# Patient Record
Sex: FEMALE | Race: WHITE | NOT HISPANIC OR LATINO | Employment: FULL TIME | URBAN - METROPOLITAN AREA
[De-identification: names, ages, dates, MRNs, and addresses within clinical notes are randomized per-mention and may not be internally consistent; named-entity substitution may affect disease eponyms.]

---

## 2017-01-09 ENCOUNTER — ROUTINE PRENATAL (OUTPATIENT)
Dept: OBSTETRICS AND GYNECOLOGY | Facility: CLINIC | Age: 32
End: 2017-01-09
Attending: OBSTETRICS & GYNECOLOGY
Payer: MEDICAID

## 2017-01-09 VITALS
WEIGHT: 253.31 LBS | DIASTOLIC BLOOD PRESSURE: 70 MMHG | SYSTOLIC BLOOD PRESSURE: 114 MMHG | BODY MASS INDEX: 42.15 KG/M2

## 2017-01-09 DIAGNOSIS — Z3A.31 31 WEEKS GESTATION OF PREGNANCY: Primary | ICD-10-CM

## 2017-01-09 DIAGNOSIS — O24.410 DIET CONTROLLED GESTATIONAL DIABETES MELLITUS (GDM) IN THIRD TRIMESTER: ICD-10-CM

## 2017-01-09 DIAGNOSIS — O34.219 PREVIOUS CESAREAN SECTION COMPLICATING PREGNANCY: ICD-10-CM

## 2017-01-09 DIAGNOSIS — Z82.79 FAMILY HISTORY OF OPEN NEURAL TUBE DEFECT: ICD-10-CM

## 2017-01-09 DIAGNOSIS — O09.293 HISTORY OF PRE-ECLAMPSIA IN PRIOR PREGNANCY, CURRENTLY PREGNANT IN THIRD TRIMESTER: ICD-10-CM

## 2017-01-09 DIAGNOSIS — O24.419 GESTATIONAL DIABETES MELLITUS (GDM) AFFECTING THIRD PREGNANCY: ICD-10-CM

## 2017-01-09 DIAGNOSIS — Z82.79 FAMILY HISTORY OF CONGENITAL HEART DEFECT: ICD-10-CM

## 2017-01-09 DIAGNOSIS — O99.213 OBESITY AFFECTING PREGNANCY IN THIRD TRIMESTER: ICD-10-CM

## 2017-01-09 DIAGNOSIS — Z23 NEED FOR VACCINATION FOR DTP: ICD-10-CM

## 2017-01-09 DIAGNOSIS — D75.A G6PD DEFICIENCY: ICD-10-CM

## 2017-01-09 PROCEDURE — 99213 OFFICE O/P EST LOW 20 MIN: CPT | Mod: TH,S$PBB,, | Performed by: OBSTETRICS & GYNECOLOGY

## 2017-01-09 PROCEDURE — 99999 PR PBB SHADOW E&M-EST. PATIENT-LVL II: CPT | Mod: PBBFAC,,, | Performed by: OBSTETRICS & GYNECOLOGY

## 2017-01-09 PROCEDURE — 99999 PR PBB SHADOW E&M-EST. PATIENT-LVL I: CPT | Mod: PBBFAC,,,

## 2017-01-09 PROCEDURE — 99212 OFFICE O/P EST SF 10 MIN: CPT | Mod: PBBFAC,27,25 | Performed by: OBSTETRICS & GYNECOLOGY

## 2017-01-09 NOTE — PROGRESS NOTES
Here for TDAP injection. Patient without complaint of pain at this time ,Injection given. Tolerated well. No pain noted after injection.  Advised to wait in lobby 15 minutes and report any adverse reactions.

## 2017-01-17 ENCOUNTER — HOSPITAL ENCOUNTER (OUTPATIENT)
Dept: PERINATAL CARE | Facility: OTHER | Age: 32
Discharge: HOME | End: 2017-01-17
Attending: OBSTETRICS & GYNECOLOGY
Payer: MEDICAID

## 2017-01-17 DIAGNOSIS — O24.419 GESTATIONAL DIABETES MELLITUS (GDM) AFFECTING THIRD PREGNANCY: ICD-10-CM

## 2017-01-17 PROCEDURE — 59025 FETAL NON-STRESS TEST: CPT

## 2017-01-17 PROCEDURE — 76815 OB US LIMITED FETUS(S): CPT | Mod: 26,,, | Performed by: OBSTETRICS & GYNECOLOGY

## 2017-01-17 PROCEDURE — 76815 OB US LIMITED FETUS(S): CPT

## 2017-01-20 ENCOUNTER — HOSPITAL ENCOUNTER (OUTPATIENT)
Dept: PERINATAL CARE | Facility: OTHER | Age: 32
Discharge: HOME | End: 2017-01-20
Attending: OBSTETRICS & GYNECOLOGY
Payer: MEDICAID

## 2017-01-20 DIAGNOSIS — O24.419 GESTATIONAL DIABETES MELLITUS (GDM) AFFECTING THIRD PREGNANCY: ICD-10-CM

## 2017-01-20 PROCEDURE — 76818 FETAL BIOPHYS PROFILE W/NST: CPT | Mod: 26,,, | Performed by: OBSTETRICS & GYNECOLOGY

## 2017-01-20 PROCEDURE — 76818 FETAL BIOPHYS PROFILE W/NST: CPT

## 2017-01-23 ENCOUNTER — HOSPITAL ENCOUNTER (OUTPATIENT)
Dept: PERINATAL CARE | Facility: OTHER | Age: 32
Discharge: HOME | End: 2017-01-23
Attending: OBSTETRICS & GYNECOLOGY
Payer: MEDICAID

## 2017-01-23 ENCOUNTER — ROUTINE PRENATAL (OUTPATIENT)
Dept: OBSTETRICS AND GYNECOLOGY | Facility: CLINIC | Age: 32
End: 2017-01-23
Attending: OBSTETRICS & GYNECOLOGY
Payer: MEDICAID

## 2017-01-23 VITALS
SYSTOLIC BLOOD PRESSURE: 118 MMHG | BODY MASS INDEX: 42.15 KG/M2 | DIASTOLIC BLOOD PRESSURE: 72 MMHG | WEIGHT: 253.31 LBS

## 2017-01-23 DIAGNOSIS — O99.213 OBESITY AFFECTING PREGNANCY IN THIRD TRIMESTER: ICD-10-CM

## 2017-01-23 DIAGNOSIS — Z82.79 FAMILY HISTORY OF OPEN NEURAL TUBE DEFECT: ICD-10-CM

## 2017-01-23 DIAGNOSIS — D75.A G6PD DEFICIENCY: ICD-10-CM

## 2017-01-23 DIAGNOSIS — O34.219 PREVIOUS CESAREAN SECTION COMPLICATING PREGNANCY: Primary | ICD-10-CM

## 2017-01-23 DIAGNOSIS — O09.293 HISTORY OF PRE-ECLAMPSIA IN PRIOR PREGNANCY, CURRENTLY PREGNANT IN THIRD TRIMESTER: ICD-10-CM

## 2017-01-23 DIAGNOSIS — O24.410 DIET CONTROLLED GESTATIONAL DIABETES MELLITUS (GDM) IN THIRD TRIMESTER: ICD-10-CM

## 2017-01-23 DIAGNOSIS — Z82.79 FAMILY HISTORY OF CONGENITAL HEART DEFECT: ICD-10-CM

## 2017-01-23 DIAGNOSIS — O24.419 GESTATIONAL DIABETES MELLITUS (GDM) AFFECTING THIRD PREGNANCY: ICD-10-CM

## 2017-01-23 PROCEDURE — 76815 OB US LIMITED FETUS(S): CPT | Mod: 26,,, | Performed by: OBSTETRICS & GYNECOLOGY

## 2017-01-23 PROCEDURE — 99999 PR PBB SHADOW E&M-EST. PATIENT-LVL II: CPT | Mod: PBBFAC,,, | Performed by: OBSTETRICS & GYNECOLOGY

## 2017-01-23 PROCEDURE — 59025 FETAL NON-STRESS TEST: CPT

## 2017-01-23 PROCEDURE — 76815 OB US LIMITED FETUS(S): CPT

## 2017-01-23 PROCEDURE — 99213 OFFICE O/P EST LOW 20 MIN: CPT | Mod: TH,S$PBB,, | Performed by: OBSTETRICS & GYNECOLOGY

## 2017-01-23 NOTE — MR AVS SNAPSHOT
[facility] [address]  [address]  [place] [address]  Phone: [phone]  Fax: [phone]                  Zhanna Carlton   2017 8:15 AM   Routine Prenatal    Description:  Female : 1985   Provider:  Merari Mosquera MD   Department:  [facility] [address] [facility]           Reason for Visit     Routine Prenatal Visit                To Do List           Future Appointments        Provider Department Dept Phone    2017 9:00 AM TESTING, PRENATAL [facility] [phone]    2017 8:00 AM TESTING, PRENATAL [facility] [phone]    2017 8:15 AM Merari Mosquera MD [facility] [phone]    2017 8:00 AM Merari Mosquera MD [facility] [address] [facility] [phone]    2017 8:00 AM Merari Mosquera MD [facility] [address] [facility] [phone]      Goals (5 Years of Data)     None      [facility]     [facility] -  Assistance  Registered nurses in the [facility] provide clinical advisement, health education, appointment booking, and other advisory services.  Call for this free service at [phone].             Medications           Message regarding Medications     Verify the changes and/or additions to your medication regime listed below are the same as discussed with your clinician today.  If any of these changes or additions are incorrect, please notify your healthcare provider.             Verify that the below list of medications is an accurate representation of the medications you are currently taking.  If none reported, the list may be blank. If incorrect, please contact your healthcare provider. Carry this list with you in case of emergency.           Current Medications     ascorbic acid, vitamin C, (VITAMIN C) 100 MG tablet Take 100 mg by mouth once daily.    aspirin (ECOTRIN) 81 MG EC tablet Take 81 mg by mouth once daily.    PNV  "cmb#21-iron-folic acid (PRENATAL COMPLETE)  mg-mcg Tab Take 1 tablet by mouth once daily.    blood sugar diagnostic Strp TRUE MATRIX blood glucose test strips & lancets. Check blood sugars 4 times daily.           Clinical Reference Information           Prenatal Vitals     Enc. Date GA Prenatal Vitals Prenatal Pulse Pain Level Urine Albumin/Glucose Edema Presentation Dilation/Effacement/Station    17 33w2d 118/72 / 114.9 kg (253 lb 4.9 oz)   0 Negative / Negative       17 31w2d 114/70 / 114.9 kg (253 lb 4.9 oz)   0 Negative / Negative       16 29w4d 116/80 / 113.6 kg (250 lb 7.1 oz) 32 cm / 148 / Present  0 Negative / Negative None / None / None / No      16 27w6d 118/82 / 115.3 kg (254 lb 3.1 oz) 30 cm / 147 / Present  0 Negative / Negative None / None / None / No      16 24w4d 128/86 / 114.3 kg (251 lb 15.8 oz)           16 23w4d 110/70 / 113.6 kg (250 lb 7.1 oz) 25 cm / 148 / Present  0 Negative / Negative None / None / None / No      10/19/16 19w4d 130/70 / 110.7 kg (244 lb 0.8 oz) 20 cm / 148  0 Negative / Negative None / None / None / No      10/16/16 19w1d Admission Dx: Antepartum premature rupture of membrane Dept: [facility] ASSE    16 16w6d 120/74 / 112.4 kg (247 lb 12.8 oz)   0 Negative / Negative None / None / None / No      16 10w4d 132/80 / 111.4 kg (245 lb 9.5 oz)   0 Negative / Negative       16 6w4d 118/76 / 108.2 kg (238 lb 8.6 oz)     None / None / None / No         TW.9 kg (15 lb 3.4 oz)   Pregravid weight: 108 kg (238 lb 1.6 oz)   Number of fetuses: 1   Height: 5' 5" (1.651 m)   BMI: 39.6       Vital Signs - Last Recorded  Most recent update: 2017  8:14 AM by Keri Muir MA    BP Wt LMP BMI       118/72 114.9 kg (253 lb 4.9 oz) 2016 (Exact Date) 42.15 kg/m2       Allergies as of 2017     No Known Allergies      Immunizations Administered on Date of Encounter - 2017     None      "

## 2017-01-25 ENCOUNTER — HOSPITAL ENCOUNTER (OUTPATIENT)
Dept: PERINATAL CARE | Facility: OTHER | Age: 32
Discharge: HOME | End: 2017-01-25
Attending: OBSTETRICS & GYNECOLOGY
Payer: MEDICAID

## 2017-01-25 DIAGNOSIS — O24.419 GESTATIONAL DIABETES MELLITUS (GDM) AFFECTING THIRD PREGNANCY: ICD-10-CM

## 2017-01-25 PROCEDURE — 59025 FETAL NON-STRESS TEST: CPT

## 2017-01-25 PROCEDURE — 59025 FETAL NON-STRESS TEST: CPT | Mod: 26,,, | Performed by: OBSTETRICS & GYNECOLOGY

## 2017-01-31 ENCOUNTER — HOSPITAL ENCOUNTER (OUTPATIENT)
Dept: PERINATAL CARE | Facility: OTHER | Age: 32
Discharge: HOME | End: 2017-01-31
Attending: OBSTETRICS & GYNECOLOGY
Payer: MEDICAID

## 2017-01-31 DIAGNOSIS — O24.419 GESTATIONAL DIABETES MELLITUS (GDM) AFFECTING THIRD PREGNANCY: ICD-10-CM

## 2017-01-31 PROCEDURE — 59025 FETAL NON-STRESS TEST: CPT

## 2017-01-31 PROCEDURE — 76815 OB US LIMITED FETUS(S): CPT | Mod: 26,,, | Performed by: OBSTETRICS & GYNECOLOGY

## 2017-01-31 PROCEDURE — 76815 OB US LIMITED FETUS(S): CPT

## 2017-02-03 ENCOUNTER — HOSPITAL ENCOUNTER (OUTPATIENT)
Dept: PERINATAL CARE | Facility: OTHER | Age: 32
Discharge: HOME | End: 2017-02-03
Attending: OBSTETRICS & GYNECOLOGY
Payer: MEDICAID

## 2017-02-03 DIAGNOSIS — O24.419 GESTATIONAL DIABETES MELLITUS (GDM) AFFECTING THIRD PREGNANCY: ICD-10-CM

## 2017-02-03 PROCEDURE — 59025 FETAL NON-STRESS TEST: CPT

## 2017-02-03 PROCEDURE — 59025 FETAL NON-STRESS TEST: CPT | Mod: 26,,, | Performed by: OBSTETRICS & GYNECOLOGY

## 2017-02-03 PROCEDURE — 76815 OB US LIMITED FETUS(S): CPT

## 2017-02-07 ENCOUNTER — ROUTINE PRENATAL (OUTPATIENT)
Dept: OBSTETRICS AND GYNECOLOGY | Facility: CLINIC | Age: 32
End: 2017-02-07
Attending: OBSTETRICS & GYNECOLOGY
Payer: MEDICAID

## 2017-02-07 ENCOUNTER — HOSPITAL ENCOUNTER (OUTPATIENT)
Dept: PERINATAL CARE | Facility: OTHER | Age: 32
Discharge: HOME | End: 2017-02-07
Attending: OBSTETRICS & GYNECOLOGY
Payer: MEDICAID

## 2017-02-07 VITALS — SYSTOLIC BLOOD PRESSURE: 142 MMHG | DIASTOLIC BLOOD PRESSURE: 72 MMHG | BODY MASS INDEX: 42.19 KG/M2 | WEIGHT: 253.5 LBS

## 2017-02-07 DIAGNOSIS — Z82.79 FAMILY HISTORY OF OPEN NEURAL TUBE DEFECT: ICD-10-CM

## 2017-02-07 DIAGNOSIS — D75.A G6PD DEFICIENCY: ICD-10-CM

## 2017-02-07 DIAGNOSIS — O09.293 HISTORY OF PRE-ECLAMPSIA IN PRIOR PREGNANCY, CURRENTLY PREGNANT IN THIRD TRIMESTER: ICD-10-CM

## 2017-02-07 DIAGNOSIS — O13.3 GESTATIONAL HYPERTENSION, THIRD TRIMESTER: ICD-10-CM

## 2017-02-07 DIAGNOSIS — O24.410 DIET CONTROLLED GESTATIONAL DIABETES MELLITUS (GDM) IN THIRD TRIMESTER: ICD-10-CM

## 2017-02-07 DIAGNOSIS — Z82.79 FAMILY HISTORY OF CONGENITAL HEART DEFECT: ICD-10-CM

## 2017-02-07 DIAGNOSIS — Z36.85 SCREENING, ANTENATAL, FOR STREPTOCOCCUS B: ICD-10-CM

## 2017-02-07 DIAGNOSIS — O34.219 PREVIOUS CESAREAN SECTION COMPLICATING PREGNANCY: Primary | ICD-10-CM

## 2017-02-07 DIAGNOSIS — O24.419 GESTATIONAL DIABETES MELLITUS (GDM) AFFECTING THIRD PREGNANCY: ICD-10-CM

## 2017-02-07 DIAGNOSIS — Z34.80 SUPERVISION OF OTHER NORMAL PREGNANCY: ICD-10-CM

## 2017-02-07 LAB
CREAT UR-MCNC: 71 MG/DL
PROT UR-MCNC: <7 MG/DL
PROT/CREAT RATIO, UR: NORMAL

## 2017-02-07 PROCEDURE — 59025 FETAL NON-STRESS TEST: CPT

## 2017-02-07 PROCEDURE — 87147 CULTURE TYPE IMMUNOLOGIC: CPT

## 2017-02-07 PROCEDURE — 76815 OB US LIMITED FETUS(S): CPT | Mod: 26,,, | Performed by: OBSTETRICS & GYNECOLOGY

## 2017-02-07 PROCEDURE — 76815 OB US LIMITED FETUS(S): CPT

## 2017-02-07 PROCEDURE — 59025 FETAL NON-STRESS TEST: CPT | Mod: 26,,, | Performed by: OBSTETRICS & GYNECOLOGY

## 2017-02-07 PROCEDURE — 99999 PR PBB SHADOW E&M-EST. PATIENT-LVL II: CPT | Mod: PBBFAC,,, | Performed by: OBSTETRICS & GYNECOLOGY

## 2017-02-07 PROCEDURE — 99213 OFFICE O/P EST LOW 20 MIN: CPT | Mod: TH,S$PBB,, | Performed by: OBSTETRICS & GYNECOLOGY

## 2017-02-07 PROCEDURE — 87184 SC STD DISK METHOD PER PLATE: CPT

## 2017-02-07 NOTE — PROGRESS NOTES
Pregnancy at 35w3d with slightly decreased Fetal Movement for the past 2 days. Will get testing today and twice weekly. Denies headaches, N/V , or abd pain. Reinforced kick counts, labor precautions, Pre Eclampsia precautions.

## 2017-02-10 ENCOUNTER — HOSPITAL ENCOUNTER (OUTPATIENT)
Dept: PERINATAL CARE | Facility: OTHER | Age: 32
Discharge: HOME | End: 2017-02-10
Attending: OBSTETRICS & GYNECOLOGY
Payer: MEDICAID

## 2017-02-10 DIAGNOSIS — O24.419 GESTATIONAL DIABETES MELLITUS (GDM) AFFECTING THIRD PREGNANCY: ICD-10-CM

## 2017-02-10 PROCEDURE — 59025 FETAL NON-STRESS TEST: CPT | Mod: 26,,, | Performed by: OBSTETRICS & GYNECOLOGY

## 2017-02-10 PROCEDURE — 59025 FETAL NON-STRESS TEST: CPT

## 2017-02-12 LAB — BACTERIA SPEC AEROBE CULT: NORMAL

## 2017-02-14 ENCOUNTER — ROUTINE PRENATAL (OUTPATIENT)
Dept: OBSTETRICS AND GYNECOLOGY | Facility: CLINIC | Age: 32
End: 2017-02-14
Attending: OBSTETRICS & GYNECOLOGY
Payer: MEDICAID

## 2017-02-14 ENCOUNTER — HOSPITAL ENCOUNTER (OUTPATIENT)
Dept: PERINATAL CARE | Facility: OTHER | Age: 32
Discharge: HOME | End: 2017-02-14
Attending: OBSTETRICS & GYNECOLOGY
Payer: MEDICAID

## 2017-02-14 VITALS
BODY MASS INDEX: 42.23 KG/M2 | SYSTOLIC BLOOD PRESSURE: 122 MMHG | DIASTOLIC BLOOD PRESSURE: 74 MMHG | WEIGHT: 253.75 LBS

## 2017-02-14 DIAGNOSIS — D75.A G6PD DEFICIENCY: ICD-10-CM

## 2017-02-14 DIAGNOSIS — Z82.79 FAMILY HISTORY OF OPEN NEURAL TUBE DEFECT: ICD-10-CM

## 2017-02-14 DIAGNOSIS — O24.419 GESTATIONAL DIABETES MELLITUS (GDM) AFFECTING THIRD PREGNANCY: ICD-10-CM

## 2017-02-14 DIAGNOSIS — O34.219 PREVIOUS CESAREAN SECTION COMPLICATING PREGNANCY: Primary | ICD-10-CM

## 2017-02-14 DIAGNOSIS — O99.820 GROUP B STREPTOCOCCUS CARRIER, +RV CULTURE, CURRENTLY PREGNANT: ICD-10-CM

## 2017-02-14 DIAGNOSIS — Z82.79 FAMILY HISTORY OF CONGENITAL HEART DEFECT: ICD-10-CM

## 2017-02-14 DIAGNOSIS — O99.213 OBESITY AFFECTING PREGNANCY IN THIRD TRIMESTER: ICD-10-CM

## 2017-02-14 DIAGNOSIS — O09.293 HISTORY OF PRE-ECLAMPSIA IN PRIOR PREGNANCY, CURRENTLY PREGNANT IN THIRD TRIMESTER: ICD-10-CM

## 2017-02-14 DIAGNOSIS — O24.410 DIET CONTROLLED GESTATIONAL DIABETES MELLITUS (GDM) IN THIRD TRIMESTER: ICD-10-CM

## 2017-02-14 PROCEDURE — 76815 OB US LIMITED FETUS(S): CPT | Mod: 26,,, | Performed by: OBSTETRICS & GYNECOLOGY

## 2017-02-14 PROCEDURE — 59025 FETAL NON-STRESS TEST: CPT | Mod: 26,,, | Performed by: OBSTETRICS & GYNECOLOGY

## 2017-02-14 PROCEDURE — 99212 OFFICE O/P EST SF 10 MIN: CPT | Mod: PBBFAC | Performed by: OBSTETRICS & GYNECOLOGY

## 2017-02-14 PROCEDURE — 99213 OFFICE O/P EST LOW 20 MIN: CPT | Mod: TH,S$PBB,, | Performed by: OBSTETRICS & GYNECOLOGY

## 2017-02-14 PROCEDURE — 99999 PR PBB SHADOW E&M-EST. PATIENT-LVL II: CPT | Mod: PBBFAC,,, | Performed by: OBSTETRICS & GYNECOLOGY

## 2017-02-14 NOTE — PROGRESS NOTES
Pregnancy at 36w3d, with good FM. Gest DM, diet controlled.  Discussed Pre Eclampsia precautions.  + Grp B Strep cultures, needs PCN @ Delivery

## 2017-02-17 ENCOUNTER — HOSPITAL ENCOUNTER (OUTPATIENT)
Dept: PERINATAL CARE | Facility: OTHER | Age: 32
Discharge: HOME | End: 2017-02-17
Attending: OBSTETRICS & GYNECOLOGY
Payer: MEDICAID

## 2017-02-17 DIAGNOSIS — O24.419 GESTATIONAL DIABETES MELLITUS (GDM) AFFECTING THIRD PREGNANCY: ICD-10-CM

## 2017-02-17 PROCEDURE — 59025 FETAL NON-STRESS TEST: CPT | Mod: 26,,, | Performed by: OBSTETRICS & GYNECOLOGY

## 2017-02-17 PROCEDURE — 59025 FETAL NON-STRESS TEST: CPT

## 2017-02-17 NOTE — PROGRESS NOTES
Indication: NST in PNT (Dr. Mosquera).   GDM.   ____________________________________________________________________________  History: Age: 31 years. : 3 Para: 2.   Obstetric History: Mode of last delivery:  Section.  ____________________________________________________________________________  Dating:  LMP: 16 EDC: 17 GA by LMP: 36w6d  Best Overall Assessment: 10/24/16 EDC: 17 Assessed GA: 36w6d  The Best Overall Assessment is based on the LMP.  ____________________________________________________________________________  Anatomy Scan:  Perez gestation.  ____________________________________________________________________________  Fetal Wellbeing Assessment:  Non Stress Test: reactive. Fetal heart rate: 130 bpm. +accels, no decels, moderate variability, no reg ctx.     ____________________________________________________________________________  Maternal Assessment:  Followup examination.   Blood pressure: 128 / 84 mmHg.   Progress Notes: reports + fetal movement, denies vag bleeding, ctx or loss of fluid.   Comments: taking Vit C, ASA 81mg q day, PNV.   Summary: monitored 2341-7111.   ____________________________________________________________________________  Education:  The following topics have been discussed with the patient:  Fetal movement monitoring.   ____________________________________________________________________________  Report Summary:  Impression: NST reactive and reassuring.     Recommendations: fetal surveillance per clinical condition

## 2017-02-21 ENCOUNTER — ROUTINE PRENATAL (OUTPATIENT)
Dept: OBSTETRICS AND GYNECOLOGY | Facility: CLINIC | Age: 32
End: 2017-02-21
Attending: OBSTETRICS & GYNECOLOGY
Payer: MEDICAID

## 2017-02-21 ENCOUNTER — HOSPITAL ENCOUNTER (OUTPATIENT)
Dept: PERINATAL CARE | Facility: OTHER | Age: 32
Discharge: HOME | End: 2017-02-21
Attending: OBSTETRICS & GYNECOLOGY
Payer: MEDICAID

## 2017-02-21 VITALS — SYSTOLIC BLOOD PRESSURE: 130 MMHG | WEIGHT: 255.5 LBS | DIASTOLIC BLOOD PRESSURE: 80 MMHG | BODY MASS INDEX: 42.52 KG/M2

## 2017-02-21 DIAGNOSIS — O99.213 OBESITY AFFECTING PREGNANCY IN THIRD TRIMESTER: ICD-10-CM

## 2017-02-21 DIAGNOSIS — D75.A G6PD DEFICIENCY: ICD-10-CM

## 2017-02-21 DIAGNOSIS — O09.293 HISTORY OF PRE-ECLAMPSIA IN PRIOR PREGNANCY, CURRENTLY PREGNANT IN THIRD TRIMESTER: ICD-10-CM

## 2017-02-21 DIAGNOSIS — Z82.79 FAMILY HISTORY OF OPEN NEURAL TUBE DEFECT: ICD-10-CM

## 2017-02-21 DIAGNOSIS — O24.410 DIET CONTROLLED GESTATIONAL DIABETES MELLITUS (GDM) IN THIRD TRIMESTER: ICD-10-CM

## 2017-02-21 DIAGNOSIS — O24.419 GESTATIONAL DIABETES MELLITUS (GDM) AFFECTING THIRD PREGNANCY: ICD-10-CM

## 2017-02-21 DIAGNOSIS — O34.219 PREVIOUS CESAREAN SECTION COMPLICATING PREGNANCY: ICD-10-CM

## 2017-02-21 DIAGNOSIS — O22.43 HEMORRHOIDS DURING PREGNANCY IN THIRD TRIMESTER: ICD-10-CM

## 2017-02-21 DIAGNOSIS — Z82.79 FAMILY HISTORY OF CONGENITAL HEART DEFECT: ICD-10-CM

## 2017-02-21 DIAGNOSIS — O24.419 GESTATIONAL DIABETES MELLITUS (GDM) AFFECTING THIRD PREGNANCY: Primary | ICD-10-CM

## 2017-02-21 DIAGNOSIS — O99.820 GROUP B STREPTOCOCCUS CARRIER, +RV CULTURE, CURRENTLY PREGNANT: ICD-10-CM

## 2017-02-21 DIAGNOSIS — O24.410 DIET CONTROLLED GESTATIONAL DIABETES MELLITUS (GDM) IN SECOND TRIMESTER: ICD-10-CM

## 2017-02-21 PROCEDURE — 99213 OFFICE O/P EST LOW 20 MIN: CPT | Mod: TH,S$PBB,, | Performed by: OBSTETRICS & GYNECOLOGY

## 2017-02-21 PROCEDURE — 59025 FETAL NON-STRESS TEST: CPT

## 2017-02-21 PROCEDURE — 76815 OB US LIMITED FETUS(S): CPT

## 2017-02-21 PROCEDURE — 76815 OB US LIMITED FETUS(S): CPT | Mod: 26,,, | Performed by: OBSTETRICS & GYNECOLOGY

## 2017-02-21 PROCEDURE — 59025 FETAL NON-STRESS TEST: CPT | Mod: 26,,, | Performed by: OBSTETRICS & GYNECOLOGY

## 2017-02-21 PROCEDURE — 99999 PR PBB SHADOW E&M-EST. PATIENT-LVL I: CPT | Mod: PBBFAC,,, | Performed by: OBSTETRICS & GYNECOLOGY

## 2017-02-21 RX ORDER — HYDROCORTISONE 25 MG/G
CREAM TOPICAL
Qty: 30 G | Refills: 1 | Status: SHIPPED | OUTPATIENT
Start: 2017-02-21 | End: 2017-03-23

## 2017-02-21 RX ORDER — LANCETS 28 GAUGE
1 EACH MISCELLANEOUS 4 TIMES DAILY
Qty: 150 EACH | Refills: 6 | Status: ON HOLD | OUTPATIENT
Start: 2017-02-21 | End: 2017-03-09 | Stop reason: HOSPADM

## 2017-02-21 NOTE — PROGRESS NOTES
Pregnancy at 37w3d with painful hemorrhoids. NON Thrombosed.   Pre Eclampsia precautions. Twice weekly NST .

## 2017-02-21 NOTE — PATIENT INSTRUCTIONS
Pregnancy and Childbirth: What to Bring to the Hospital    Youre likely feeling anxious as your childs birth approaches. This is normal. To give yourself some peace of mind, pack a bag ahead of time. Do this about 1 month ahead of your estimated delivery date. Here is a list of things to remember:  · Personal care items, like a toothbrush, hair brush, lip balm, lotion, and shampoo  · Eyeglasses (if you wear them)  · Nightgown (if you plan to breastfeed, pack 1 that allows for nursing)  · Nursing bra if you plan to breastfeed  · Bathrobe and slippers  · Many hospitals provide maternity underwear, but you may want to bring underwear that can be soiled because you will have bleeding after delivery  · Comfortable clothes for you to wear home, like sweat pants, yoga pants, or other stretchable clothes, because your prepregnancy clothes may not fit after delivery of your baby  · Clothes for your baby to wear home  · Personal music player and headphones  · Camera with new batteries or   · Coins for vending machines  · Telephone numbers of people to call after the birth  · Cell phone and   · Insurance information and any other paperwork needed for your hospital stay  · A list of baby names you are considering  · An infant, rear-facing car seat for bringing home your baby (this is required by law)  Add anything else that you dont want to forget:  _____________________________________  _____________________________________  _____________________________________  _____________________________________  _____________________________________  _____________________________________  _____________________________________  Date Last Reviewed: 8/7/2015  © 4371-1067 [employer]. [address] [place] [address]. All rights reserved. This information is not intended as a substitute for professional medical care. Always follow your healthcare professional's instructions.

## 2017-02-22 NOTE — PROGRESS NOTES
Indication: NST, MVP in PNT (DRU OROPEZA).   Maternal age (31 years).   GDM.   ____________________________________________________________________________  History: Age: 31 years. : 3 Para: 2.   Obstetric History: Mode of last delivery:  Section.  ____________________________________________________________________________  Dating:  LMP: 16 EDC: 17 GA by LMP: 37w3d  Best Overall Assessment: 10/24/16 EDC: 17 Assessed GA: 37w3d  The Best Overall Assessment is based on the LMP.  ____________________________________________________________________________  General Evaluation:  Fetal heart activity: present. Fetal heart rate: 141 bpm.   Presentation: cephalic.   Fetal movement: visible.   Amniotic Fluid: normal. Maximal vertical pocket 6.5 cm.   Cord: 3 vessels.   Placenta: posterior fundal. Left.     ____________________________________________________________________________  Anatomy Scan:  Perez gestation.     ____________________________________________________________________________  Fetal Wellbeing Assessment:  Amniotic fluid: normal. MVP: 6.5 cm.   Non Stress Test: reactive. Fetal heart rate: 130 bpm. + accels, no decels ,moderate variability, no reg ctx ( fetus w hiccups).     ____________________________________________________________________________  Maternal Assessment:  Followup examination.   Blood pressure: 129 / 81 mmHg.   Progress Notes: reports + fetal movement, denies vag bleeding , ctx or loss of fluid.   Comments: taking PNV, Vit C, ASA 81mg  forgot log today.   Summary: monitored [id number].   ____________________________________________________________________________  Education:  The following topics have been discussed with the patient:  Fetal movement monitoring.   ____________________________________________________________________________  Report Summary:  Impression: NST reactive and reassuring  Amniotic fluid volume wnl (MVP 6.5cm).      Recommendations: fetal surveillance per clinical condition

## 2017-02-24 ENCOUNTER — HOSPITAL ENCOUNTER (OUTPATIENT)
Dept: PERINATAL CARE | Facility: OTHER | Age: 32
Discharge: HOME | End: 2017-02-24
Attending: OBSTETRICS & GYNECOLOGY
Payer: MEDICAID

## 2017-02-24 DIAGNOSIS — O24.419 GESTATIONAL DIABETES MELLITUS (GDM) AFFECTING THIRD PREGNANCY: ICD-10-CM

## 2017-02-24 PROCEDURE — 76815 OB US LIMITED FETUS(S): CPT

## 2017-02-24 PROCEDURE — 59025 FETAL NON-STRESS TEST: CPT | Mod: 26,,, | Performed by: OBSTETRICS & GYNECOLOGY

## 2017-02-24 PROCEDURE — 59025 FETAL NON-STRESS TEST: CPT

## 2017-02-27 ENCOUNTER — ROUTINE PRENATAL (OUTPATIENT)
Dept: OBSTETRICS AND GYNECOLOGY | Facility: CLINIC | Age: 32
End: 2017-02-27
Attending: OBSTETRICS & GYNECOLOGY
Payer: MEDICAID

## 2017-02-27 ENCOUNTER — HOSPITAL ENCOUNTER (OUTPATIENT)
Dept: PERINATAL CARE | Facility: OTHER | Age: 32
Discharge: HOME | End: 2017-02-27
Attending: OBSTETRICS & GYNECOLOGY
Payer: MEDICAID

## 2017-02-27 VITALS — BODY MASS INDEX: 42.7 KG/M2 | DIASTOLIC BLOOD PRESSURE: 70 MMHG | SYSTOLIC BLOOD PRESSURE: 118 MMHG | WEIGHT: 256.63 LBS

## 2017-02-27 DIAGNOSIS — O99.820 GROUP B STREPTOCOCCUS CARRIER, +RV CULTURE, CURRENTLY PREGNANT: ICD-10-CM

## 2017-02-27 DIAGNOSIS — O99.213 OBESITY AFFECTING PREGNANCY IN THIRD TRIMESTER: ICD-10-CM

## 2017-02-27 DIAGNOSIS — O24.419 GESTATIONAL DIABETES MELLITUS (GDM) AFFECTING THIRD PREGNANCY: ICD-10-CM

## 2017-02-27 DIAGNOSIS — Z82.79 FAMILY HISTORY OF OPEN NEURAL TUBE DEFECT: ICD-10-CM

## 2017-02-27 DIAGNOSIS — O09.293 HISTORY OF PRE-ECLAMPSIA IN PRIOR PREGNANCY, CURRENTLY PREGNANT IN THIRD TRIMESTER: Primary | ICD-10-CM

## 2017-02-27 DIAGNOSIS — Z82.79 FAMILY HISTORY OF CONGENITAL HEART DEFECT: ICD-10-CM

## 2017-02-27 DIAGNOSIS — O24.410 DIET CONTROLLED GESTATIONAL DIABETES MELLITUS (GDM) IN THIRD TRIMESTER: ICD-10-CM

## 2017-02-27 DIAGNOSIS — D75.A G6PD DEFICIENCY: ICD-10-CM

## 2017-02-27 DIAGNOSIS — O34.219 PREVIOUS CESAREAN SECTION COMPLICATING PREGNANCY: ICD-10-CM

## 2017-02-27 PROCEDURE — 76820 UMBILICAL ARTERY ECHO: CPT | Mod: 26,,, | Performed by: OBSTETRICS & GYNECOLOGY

## 2017-02-27 PROCEDURE — 99999 PR PBB SHADOW E&M-EST. PATIENT-LVL II: CPT | Mod: PBBFAC,,, | Performed by: OBSTETRICS & GYNECOLOGY

## 2017-02-27 PROCEDURE — 59025 FETAL NON-STRESS TEST: CPT | Mod: 26,,, | Performed by: OBSTETRICS & GYNECOLOGY

## 2017-02-27 PROCEDURE — 99213 OFFICE O/P EST LOW 20 MIN: CPT | Mod: TH,S$PBB,, | Performed by: OBSTETRICS & GYNECOLOGY

## 2017-02-27 PROCEDURE — 99212 OFFICE O/P EST SF 10 MIN: CPT | Mod: PBBFAC | Performed by: OBSTETRICS & GYNECOLOGY

## 2017-02-27 NOTE — PROGRESS NOTES
Pregnancy at 38w2d with reports of good FM. Denies contrs. LOF or bleeding. For Repeat  with BTL next Monday (one week). Discussed kick counts, labor precautions, Pre Eclampsia precautions.

## 2017-03-02 ENCOUNTER — HOSPITAL ENCOUNTER (OUTPATIENT)
Dept: PERINATAL CARE | Facility: OTHER | Age: 32
Discharge: HOME | End: 2017-03-02
Attending: OBSTETRICS & GYNECOLOGY
Payer: MEDICAID

## 2017-03-02 DIAGNOSIS — O24.419 GESTATIONAL DIABETES MELLITUS (GDM) AFFECTING THIRD PREGNANCY: ICD-10-CM

## 2017-03-02 PROCEDURE — 59025 FETAL NON-STRESS TEST: CPT | Mod: 26,,, | Performed by: OBSTETRICS & GYNECOLOGY

## 2017-03-02 PROCEDURE — 59025 FETAL NON-STRESS TEST: CPT

## 2017-03-06 ENCOUNTER — HOSPITAL ENCOUNTER (INPATIENT)
Facility: OTHER | Age: 32
LOS: 3 days | Discharge: HOME | End: 2017-03-09
Attending: OBSTETRICS & GYNECOLOGY | Admitting: OBSTETRICS & GYNECOLOGY
Payer: MEDICAID

## 2017-03-06 ENCOUNTER — ANESTHESIA (OUTPATIENT)
Dept: OBSTETRICS AND GYNECOLOGY | Facility: OTHER | Age: 32
End: 2017-03-06
Payer: MEDICAID

## 2017-03-06 ENCOUNTER — SURGERY (OUTPATIENT)
Age: 32
End: 2017-03-06

## 2017-03-06 ENCOUNTER — ANESTHESIA EVENT (OUTPATIENT)
Dept: SURGERY | Facility: OTHER | Age: 32
End: 2017-03-06
Payer: MEDICAID

## 2017-03-06 ENCOUNTER — ANESTHESIA (OUTPATIENT)
Dept: SURGERY | Facility: OTHER | Age: 32
End: 2017-03-06
Payer: MEDICAID

## 2017-03-06 ENCOUNTER — ANESTHESIA EVENT (OUTPATIENT)
Dept: OBSTETRICS AND GYNECOLOGY | Facility: OTHER | Age: 32
End: 2017-03-06
Payer: MEDICAID

## 2017-03-06 DIAGNOSIS — O24.410 DIET CONTROLLED GESTATIONAL DIABETES MELLITUS (GDM) IN THIRD TRIMESTER: ICD-10-CM

## 2017-03-06 DIAGNOSIS — Z30.2 ADMISSION FOR STERILIZATION: ICD-10-CM

## 2017-03-06 DIAGNOSIS — O99.213 OBESITY AFFECTING PREGNANCY IN THIRD TRIMESTER: ICD-10-CM

## 2017-03-06 DIAGNOSIS — Z34.90 TERM PREGNANCY: ICD-10-CM

## 2017-03-06 DIAGNOSIS — O09.293 HISTORY OF PRE-ECLAMPSIA IN PRIOR PREGNANCY, CURRENTLY PREGNANT IN THIRD TRIMESTER: ICD-10-CM

## 2017-03-06 DIAGNOSIS — O34.219 PREVIOUS CESAREAN SECTION COMPLICATING PREGNANCY: ICD-10-CM

## 2017-03-06 DIAGNOSIS — Z98.891 S/P REPEAT LOW TRANSVERSE C-SECTION: Primary | ICD-10-CM

## 2017-03-06 LAB
ABO + RH BLD: NORMAL
ALLENS TEST: ABNORMAL
ANISOCYTOSIS BLD QL SMEAR: SLIGHT
BASOPHILS # BLD AUTO: ABNORMAL K/UL
BASOPHILS NFR BLD: 0 %
BLD GP AB SCN CELLS X3 SERPL QL: NORMAL
DIFFERENTIAL METHOD: ABNORMAL
EOSINOPHIL # BLD AUTO: ABNORMAL K/UL
EOSINOPHIL NFR BLD: 2 %
ERYTHROCYTE [DISTWIDTH] IN BLOOD BY AUTOMATED COUNT: 15.8 %
HCO3 UR-SCNC: 23.4 MMOL/L (ref 24–28)
HCT VFR BLD AUTO: 34.1 %
HGB BLD-MCNC: 10.8 G/DL
HYPOCHROMIA BLD QL SMEAR: ABNORMAL
LYMPHOCYTES # BLD AUTO: ABNORMAL K/UL
LYMPHOCYTES NFR BLD: 22 %
MCH RBC QN AUTO: 27.1 PG
MCHC RBC AUTO-ENTMCNC: 31.7 %
MCV RBC AUTO: 86 FL
MONOCYTES # BLD AUTO: ABNORMAL K/UL
MONOCYTES NFR BLD: 9 %
MYELOCYTES NFR BLD MANUAL: 1 %
NEUTROPHILS NFR BLD: 62 %
NEUTS BAND NFR BLD MANUAL: 4 %
PCO2 BLDA: 53.5 MMHG (ref 35–45)
PH SMN: 7.25 [PH] (ref 7.35–7.45)
PLATELET # BLD AUTO: 308 K/UL
PLATELET BLD QL SMEAR: ABNORMAL
PMV BLD AUTO: 10.8 FL
PO2 BLDA: 12 MMHG (ref 80–100)
POC BE: -4 MMOL/L
POC SATURATED O2: 11 % (ref 95–100)
POCT GLUCOSE: 116 MG/DL (ref 70–110)
POIKILOCYTOSIS BLD QL SMEAR: SLIGHT
POLYCHROMASIA BLD QL SMEAR: ABNORMAL
RBC # BLD AUTO: 3.99 M/UL
SAMPLE: ABNORMAL
SITE: ABNORMAL
TARGETS BLD QL SMEAR: ABNORMAL
WBC # BLD AUTO: 12.29 K/UL

## 2017-03-06 PROCEDURE — 37000008 HC ANESTHESIA 1ST 15 MINUTES: Performed by: OBSTETRICS & GYNECOLOGY

## 2017-03-06 PROCEDURE — 86900 BLOOD TYPING SEROLOGIC ABO: CPT

## 2017-03-06 PROCEDURE — 63600175 PHARM REV CODE 636 W HCPCS: Performed by: STUDENT IN AN ORGANIZED HEALTH CARE EDUCATION/TRAINING PROGRAM

## 2017-03-06 PROCEDURE — 25000003 PHARM REV CODE 250: Performed by: OBSTETRICS & GYNECOLOGY

## 2017-03-06 PROCEDURE — 63600175 PHARM REV CODE 636 W HCPCS: Performed by: ANESTHESIOLOGY

## 2017-03-06 PROCEDURE — 59514 CESAREAN DELIVERY ONLY: CPT | Mod: ,,, | Performed by: ANESTHESIOLOGY

## 2017-03-06 PROCEDURE — 37000009 HC ANESTHESIA EA ADD 15 MINS: Performed by: OBSTETRICS & GYNECOLOGY

## 2017-03-06 PROCEDURE — 25000003 PHARM REV CODE 250: Performed by: STUDENT IN AN ORGANIZED HEALTH CARE EDUCATION/TRAINING PROGRAM

## 2017-03-06 PROCEDURE — 25000003 PHARM REV CODE 250: Performed by: ANESTHESIOLOGY

## 2017-03-06 PROCEDURE — 88302 TISSUE EXAM BY PATHOLOGIST: CPT | Mod: 26,,, | Performed by: PATHOLOGY

## 2017-03-06 PROCEDURE — 82803 BLOOD GASES ANY COMBINATION: CPT

## 2017-03-06 PROCEDURE — 85027 COMPLETE CBC AUTOMATED: CPT

## 2017-03-06 PROCEDURE — 27800517 HC TRAY,EPIDURAL-CONTINUOUS: Performed by: STUDENT IN AN ORGANIZED HEALTH CARE EDUCATION/TRAINING PROGRAM

## 2017-03-06 PROCEDURE — 11000001 HC ACUTE MED/SURG PRIVATE ROOM

## 2017-03-06 PROCEDURE — 86901 BLOOD TYPING SEROLOGIC RH(D): CPT

## 2017-03-06 PROCEDURE — 85007 BL SMEAR W/DIFF WBC COUNT: CPT

## 2017-03-06 PROCEDURE — 88302 TISSUE EXAM BY PATHOLOGIST: CPT | Performed by: PATHOLOGY

## 2017-03-06 PROCEDURE — 58611 LIGATE OVIDUCT(S) ADD-ON: CPT | Mod: ,,, | Performed by: OBSTETRICS & GYNECOLOGY

## 2017-03-06 PROCEDURE — 59514 CESAREAN DELIVERY ONLY: CPT | Mod: GB,,, | Performed by: OBSTETRICS & GYNECOLOGY

## 2017-03-06 PROCEDURE — 0UB70ZZ EXCISION OF BILATERAL FALLOPIAN TUBES, OPEN APPROACH: ICD-10-PCS | Performed by: OBSTETRICS & GYNECOLOGY

## 2017-03-06 PROCEDURE — 25000003 PHARM REV CODE 250

## 2017-03-06 RX ORDER — BUPIVACAINE HYDROCHLORIDE 7.5 MG/ML
INJECTION, SOLUTION INTRASPINAL
Status: DISCONTINUED | OUTPATIENT
Start: 2017-03-06 | End: 2017-03-08

## 2017-03-06 RX ORDER — METOCLOPRAMIDE HYDROCHLORIDE 5 MG/ML
10 INJECTION INTRAMUSCULAR; INTRAVENOUS ONCE
Status: COMPLETED | OUTPATIENT
Start: 2017-03-06 | End: 2017-03-06

## 2017-03-06 RX ORDER — OXYTOCIN/RINGER'S LACTATE 20/1000 ML
41.65 PLASTIC BAG, INJECTION (ML) INTRAVENOUS CONTINUOUS
Status: DISPENSED | OUTPATIENT
Start: 2017-03-06 | End: 2017-03-06

## 2017-03-06 RX ORDER — KETOROLAC TROMETHAMINE 30 MG/ML
30 INJECTION, SOLUTION INTRAMUSCULAR; INTRAVENOUS EVERY 6 HOURS
Status: COMPLETED | OUTPATIENT
Start: 2017-03-06 | End: 2017-03-07

## 2017-03-06 RX ORDER — SODIUM CITRATE AND CITRIC ACID MONOHYDRATE 334; 500 MG/5ML; MG/5ML
30 SOLUTION ORAL ONCE
Status: COMPLETED | OUTPATIENT
Start: 2017-03-06 | End: 2017-03-06

## 2017-03-06 RX ORDER — AMOXICILLIN 250 MG
1 CAPSULE ORAL NIGHTLY PRN
Status: DISCONTINUED | OUTPATIENT
Start: 2017-03-06 | End: 2017-03-09 | Stop reason: HOSPADM

## 2017-03-06 RX ORDER — SODIUM CHLORIDE, SODIUM LACTATE, POTASSIUM CHLORIDE, CALCIUM CHLORIDE 600; 310; 30; 20 MG/100ML; MG/100ML; MG/100ML; MG/100ML
INJECTION, SOLUTION INTRAVENOUS CONTINUOUS
Status: ACTIVE | OUTPATIENT
Start: 2017-03-06 | End: 2017-03-06

## 2017-03-06 RX ORDER — IBUPROFEN 400 MG/1
800 TABLET ORAL EVERY 6 HOURS
Status: DISCONTINUED | OUTPATIENT
Start: 2017-03-07 | End: 2017-03-07

## 2017-03-06 RX ORDER — OXYCODONE AND ACETAMINOPHEN 10; 325 MG/1; MG/1
1 TABLET ORAL EVERY 4 HOURS PRN
Status: DISCONTINUED | OUTPATIENT
Start: 2017-03-07 | End: 2017-03-09 | Stop reason: HOSPADM

## 2017-03-06 RX ORDER — SODIUM CITRATE AND CITRIC ACID MONOHYDRATE 334; 500 MG/5ML; MG/5ML
SOLUTION ORAL
Status: COMPLETED
Start: 2017-03-06 | End: 2017-03-06

## 2017-03-06 RX ORDER — ONDANSETRON 2 MG/ML
INJECTION INTRAMUSCULAR; INTRAVENOUS
Status: DISCONTINUED | OUTPATIENT
Start: 2017-03-06 | End: 2017-03-08

## 2017-03-06 RX ORDER — BISACODYL 10 MG
10 SUPPOSITORY, RECTAL RECTAL ONCE AS NEEDED
Status: ACTIVE | OUTPATIENT
Start: 2017-03-06 | End: 2017-03-06

## 2017-03-06 RX ORDER — OXYCODONE HYDROCHLORIDE 5 MG/1
5 TABLET ORAL EVERY 4 HOURS PRN
Status: DISCONTINUED | OUTPATIENT
Start: 2017-03-06 | End: 2017-03-09 | Stop reason: HOSPADM

## 2017-03-06 RX ORDER — MISOPROSTOL 200 UG/1
800 TABLET ORAL
Status: DISCONTINUED | OUTPATIENT
Start: 2017-03-06 | End: 2017-03-06

## 2017-03-06 RX ORDER — DOCUSATE SODIUM 100 MG/1
200 CAPSULE, LIQUID FILLED ORAL 2 TIMES DAILY
Status: DISCONTINUED | OUTPATIENT
Start: 2017-03-06 | End: 2017-03-09 | Stop reason: HOSPADM

## 2017-03-06 RX ORDER — SIMETHICONE 80 MG
1 TABLET,CHEWABLE ORAL EVERY 6 HOURS PRN
Status: DISCONTINUED | OUTPATIENT
Start: 2017-03-06 | End: 2017-03-09 | Stop reason: HOSPADM

## 2017-03-06 RX ORDER — HYDROCORTISONE 25 MG/G
CREAM TOPICAL 3 TIMES DAILY PRN
Status: DISCONTINUED | OUTPATIENT
Start: 2017-03-06 | End: 2017-03-09 | Stop reason: HOSPADM

## 2017-03-06 RX ORDER — FAMOTIDINE 10 MG/ML
INJECTION INTRAVENOUS
Status: COMPLETED
Start: 2017-03-06 | End: 2017-03-06

## 2017-03-06 RX ORDER — ADHESIVE BANDAGE
30 BANDAGE TOPICAL 2 TIMES DAILY PRN
Status: DISCONTINUED | OUTPATIENT
Start: 2017-03-07 | End: 2017-03-09 | Stop reason: HOSPADM

## 2017-03-06 RX ORDER — CEFAZOLIN SODIUM 2 G/50ML
2 SOLUTION INTRAVENOUS
Status: COMPLETED | OUTPATIENT
Start: 2017-03-06 | End: 2017-03-06

## 2017-03-06 RX ORDER — FAMOTIDINE 10 MG/ML
20 INJECTION INTRAVENOUS ONCE
Status: COMPLETED | OUTPATIENT
Start: 2017-03-06 | End: 2017-03-06

## 2017-03-06 RX ORDER — DIPHENHYDRAMINE HCL 25 MG
25 CAPSULE ORAL EVERY 4 HOURS PRN
Status: DISCONTINUED | OUTPATIENT
Start: 2017-03-06 | End: 2017-03-09 | Stop reason: HOSPADM

## 2017-03-06 RX ORDER — KETOROLAC TROMETHAMINE 30 MG/ML
INJECTION, SOLUTION INTRAMUSCULAR; INTRAVENOUS
Status: DISCONTINUED | OUTPATIENT
Start: 2017-03-06 | End: 2017-03-08

## 2017-03-06 RX ORDER — ACETAMINOPHEN 325 MG/1
650 TABLET ORAL EVERY 6 HOURS
Status: COMPLETED | OUTPATIENT
Start: 2017-03-06 | End: 2017-03-07

## 2017-03-06 RX ORDER — MORPHINE SULFATE 0.5 MG/ML
INJECTION, SOLUTION EPIDURAL; INTRATHECAL; INTRAVENOUS
Status: DISCONTINUED | OUTPATIENT
Start: 2017-03-06 | End: 2017-03-08

## 2017-03-06 RX ORDER — CARBOPROST TROMETHAMINE 250 UG/ML
INJECTION, SOLUTION INTRAMUSCULAR
Status: DISCONTINUED
Start: 2017-03-06 | End: 2017-03-06 | Stop reason: WASHOUT

## 2017-03-06 RX ORDER — PHENYLEPHRINE HYDROCHLORIDE 10 MG/ML
INJECTION INTRAVENOUS
Status: DISCONTINUED | OUTPATIENT
Start: 2017-03-06 | End: 2017-03-08

## 2017-03-06 RX ORDER — SODIUM CHLORIDE, SODIUM LACTATE, POTASSIUM CHLORIDE, CALCIUM CHLORIDE 600; 310; 30; 20 MG/100ML; MG/100ML; MG/100ML; MG/100ML
INJECTION, SOLUTION INTRAVENOUS CONTINUOUS
Status: DISCONTINUED | OUTPATIENT
Start: 2017-03-06 | End: 2017-03-06

## 2017-03-06 RX ORDER — ONDANSETRON 8 MG/1
8 TABLET, ORALLY DISINTEGRATING ORAL EVERY 8 HOURS PRN
Status: DISCONTINUED | OUTPATIENT
Start: 2017-03-06 | End: 2017-03-09 | Stop reason: HOSPADM

## 2017-03-06 RX ORDER — ONDANSETRON 2 MG/ML
4 INJECTION INTRAMUSCULAR; INTRAVENOUS EVERY 12 HOURS PRN
Status: DISCONTINUED | OUTPATIENT
Start: 2017-03-06 | End: 2017-03-09 | Stop reason: HOSPADM

## 2017-03-06 RX ORDER — FENTANYL CITRATE 50 UG/ML
INJECTION, SOLUTION INTRAMUSCULAR; INTRAVENOUS
Status: DISCONTINUED | OUTPATIENT
Start: 2017-03-06 | End: 2017-03-08

## 2017-03-06 RX ORDER — OXYTOCIN 10 [USP'U]/ML
INJECTION, SOLUTION INTRAMUSCULAR; INTRAVENOUS
Status: DISCONTINUED | OUTPATIENT
Start: 2017-03-06 | End: 2017-03-08

## 2017-03-06 RX ORDER — OXYCODONE HYDROCHLORIDE 5 MG/1
10 TABLET ORAL EVERY 4 HOURS PRN
Status: DISCONTINUED | OUTPATIENT
Start: 2017-03-06 | End: 2017-03-09 | Stop reason: HOSPADM

## 2017-03-06 RX ORDER — ACETAMINOPHEN 10 MG/ML
INJECTION, SOLUTION INTRAVENOUS
Status: DISCONTINUED | OUTPATIENT
Start: 2017-03-06 | End: 2017-03-08

## 2017-03-06 RX ORDER — OXYCODONE AND ACETAMINOPHEN 5; 325 MG/1; MG/1
1 TABLET ORAL EVERY 4 HOURS PRN
Status: DISCONTINUED | OUTPATIENT
Start: 2017-03-07 | End: 2017-03-09 | Stop reason: HOSPADM

## 2017-03-06 RX ADMIN — PHENYLEPHRINE HYDROCHLORIDE 100 MCG: 10 INJECTION INTRAVENOUS at 07:03

## 2017-03-06 RX ADMIN — FAMOTIDINE 20 MG: 10 INJECTION INTRAVENOUS at 06:03

## 2017-03-06 RX ADMIN — OXYTOCIN 2 UNITS: 10 INJECTION, SOLUTION INTRAMUSCULAR; INTRAVENOUS at 07:03

## 2017-03-06 RX ADMIN — ACETAMINOPHEN 650 MG: 325 TABLET ORAL at 06:03

## 2017-03-06 RX ADMIN — DOCUSATE SODIUM 200 MG: 100 CAPSULE, LIQUID FILLED ORAL at 09:03

## 2017-03-06 RX ADMIN — PHENYLEPHRINE HYDROCHLORIDE 100 MCG: 10 INJECTION INTRAVENOUS at 06:03

## 2017-03-06 RX ADMIN — KETOROLAC TROMETHAMINE 30 MG: 30 INJECTION, SOLUTION INTRAMUSCULAR at 06:03

## 2017-03-06 RX ADMIN — CEFAZOLIN SODIUM 2 G: 2 SOLUTION INTRAVENOUS at 06:03

## 2017-03-06 RX ADMIN — OXYCODONE HYDROCHLORIDE 10 MG: 5 TABLET ORAL at 06:03

## 2017-03-06 RX ADMIN — SODIUM CHLORIDE, SODIUM LACTATE, POTASSIUM CHLORIDE, AND CALCIUM CHLORIDE: 600; 310; 30; 20 INJECTION, SOLUTION INTRAVENOUS at 07:03

## 2017-03-06 RX ADMIN — ONDANSETRON 4 MG: 2 INJECTION INTRAMUSCULAR; INTRAVENOUS at 07:03

## 2017-03-06 RX ADMIN — BUPIVACAINE HYDROCHLORIDE IN DEXTROSE 1.6 ML: 7.5 INJECTION, SOLUTION SUBARACHNOID at 06:03

## 2017-03-06 RX ADMIN — KETOROLAC TROMETHAMINE 30 MG: 30 INJECTION, SOLUTION INTRAMUSCULAR; INTRAVENOUS at 07:03

## 2017-03-06 RX ADMIN — OXYCODONE HYDROCHLORIDE 10 MG: 5 TABLET ORAL at 09:03

## 2017-03-06 RX ADMIN — Medication 0.15 MG: at 06:03

## 2017-03-06 RX ADMIN — SODIUM CHLORIDE, SODIUM LACTATE, POTASSIUM CHLORIDE, AND CALCIUM CHLORIDE: 600; 310; 30; 20 INJECTION, SOLUTION INTRAVENOUS at 06:03

## 2017-03-06 RX ADMIN — OXYCODONE HYDROCHLORIDE 10 MG: 5 TABLET ORAL at 01:03

## 2017-03-06 RX ADMIN — SODIUM CITRATE AND CITRIC ACID MONOHYDRATE 30 ML: 500; 334 SOLUTION ORAL at 06:03

## 2017-03-06 RX ADMIN — METOCLOPRAMIDE 10 MG: 5 INJECTION, SOLUTION INTRAMUSCULAR; INTRAVENOUS at 06:03

## 2017-03-06 RX ADMIN — Medication 41.65 MILLI-UNITS/MIN: at 08:03

## 2017-03-06 RX ADMIN — OXYCODONE HYDROCHLORIDE 10 MG: 5 TABLET ORAL at 10:03

## 2017-03-06 RX ADMIN — KETOROLAC TROMETHAMINE 30 MG: 30 INJECTION, SOLUTION INTRAMUSCULAR at 01:03

## 2017-03-06 RX ADMIN — FENTANYL CITRATE 10 MCG: 50 INJECTION, SOLUTION INTRAMUSCULAR; INTRAVENOUS at 06:03

## 2017-03-06 RX ADMIN — ACETAMINOPHEN 650 MG: 325 TABLET ORAL at 01:03

## 2017-03-06 RX ADMIN — ACETAMINOPHEN 1000 MG: 10 INJECTION, SOLUTION INTRAVENOUS at 07:03

## 2017-03-06 RX ADMIN — SODIUM CITRATE AND CITRIC ACID MONOHYDRATE 30 ML: 334; 500 SOLUTION ORAL at 06:03

## 2017-03-06 NOTE — H&P
HISTORY AND PHYSICAL                                                OBSTETRICS          Subjective:       Zhanna Carlton is a 31 y.o.  female with IUP at 39w2d weeks gestation who presents for scheduled Repeat Low Transverse  with BTL this morning. Patient denies regular  contractions at this time.      This IUP is complicated by gestational diabetes, history of preE in a prior pregnancy and unwanted fertility.  Patient denies contractions, denies vaginal bleeding, denies LOF.   Fetal Movement: normal.     PMHx:   Past Medical History:   Diagnosis Date    G6PD deficiency        PSHx:   Past Surgical History:   Procedure Laterality Date     SECTION      x2       All: Review of patient's allergies indicates:  No Known Allergies    Meds:   Prescriptions Prior to Admission   Medication Sig Dispense Refill Last Dose    ascorbic acid, vitamin C, (VITAMIN C) 100 MG tablet Take 100 mg by mouth once daily.   Not Taking    aspirin (ECOTRIN) 81 MG EC tablet Take 81 mg by mouth once daily.   Taking    blood sugar diagnostic Strp TRUE MATRIX blood glucose test strips & lancets. Check blood sugars 4 times daily. 125 each 5 Taking    hydrocortisone (ANUSOL-HC) 2.5 % rectal cream Apply rectally 2 times daily 30 g 1 Taking    lancets (FREESTYLE LANCETS) 28 gauge Misc 1 lancet by Misc.(Non-Drug; Combo Route) route 4 (four) times daily. 150 each 6 Taking    prenatal cmb#95-iron-FA-dha 28 mg iron-800 mcg-200 mg Cmpk Take by mouth.   Taking       SH:   Social History     Social History    Marital status: Single     Spouse name: N/A    Number of children: N/A    Years of education: N/A     Occupational History    Not on file.     Social History Main Topics    Smoking status: Former Smoker    Smokeless tobacco: Never Used    Alcohol use No    Drug use: No    Sexual activity: Yes     Partners: Male     Birth control/ protection: None     Other Topics Concern    Not on file     Social History  "Narrative       FH:   Family History   Problem Relation Age of Onset    Breast cancer Paternal Grandmother     Ovarian cancer Paternal Grandmother     Cirrhosis Father     Colon cancer Neg Hx     Stroke Neg Hx     Hypertension Neg Hx     Diabetes Neg Hx        OBHx:   Obstetric History       T1      TAB0   SAB0   E0   M0   L2       # Outcome Date GA Lbr Michael/2nd Weight Sex Delivery Anes PTL Lv   3 Current            2  08 36w5d  3.358 kg (7 lb 6.5 oz) M CS-LTranv Spinal  Y      Name: Amador Hope Term 05 39w5d  3.941 kg (8 lb 11 oz)  CS-LTranv   Y      Name: Hayden      Complications: Failed induction,Pre-eclampsia          Objective:       /70 (BP Location: Right arm, Patient Position: Lying, BP Method: Automatic)  Pulse 90  Temp 97.9 °F (36.6 °C) (Temporal)   Resp 18  Ht 5' 5" (1.651 m)  Wt 116 kg (255 lb 11.7 oz)  LMP 2016 (Exact Date)  SpO2 98%  Breastfeeding? No  BMI 42.56 kg/m2    Vitals:    17 0531 17 0551 17 0553 17 0554   BP:   135/75 127/70   BP Location:    Right arm   Patient Position:    Lying   BP Method:    Automatic   Pulse: 95 99 98 90   Resp:       Temp:       TempSrc:       SpO2: 98% 98%     Weight:       Height:           General:   alert, appears stated age and cooperative   Lungs:   clear to auscultation bilaterally   Heart:   regular rate and rhythm, S1, S2 normal, no murmur, click, rub or gallop   Abdomen:  soft, non-tender; bowel sounds normal; no masses,  no organomegaly   Extremities negative edema, negative erythema   FHT:  Cat 1 (reassuring)                 TOCO: Irritability noted   Presentations: cephalic by ultrasound   Cervix: deferred   EFW by Leopold's: 7#    Lab Review  Blood Type O POS  GBBS: negative  Rubella: Immune  RPR: NR  HIV: negative  HepB: negative   Assessment:       39w2d weeks gestation who presents for Repeat Low Transverse  with BTL this morning.  Active Hospital Problems    " Diagnosis  POA    Term pregnancy [Z34.80]  Not Applicable      Resolved Hospital Problems    Diagnosis Date Resolved POA   No resolved problems to display.          Plan:      Risks, benefits, alternatives and possible complications have been discussed in detail with the patient.   - Consents signed and to chart  - Admit to Labor and Delivery unit  - Cephalic presentation per US   - Epidural per Anesthesia  - Draw CBC, T&S STAT  - Notify Staff  - Recheck in 2 hrs or PRN    2. Unwanted Fertility  - BTL this morning    3. History of Preeclampsia in a previous pregnancy  - no signs or symptoms of PreE at this time  - Will monitor     4. Gestational Diabetes, diet controlled  - Fasting glucose in the morning      Post-Partum Hemorrhage risk - low       Dick Rowe MD  PGY 3 OB/GYN  [phone]

## 2017-03-06 NOTE — ANESTHESIA PROCEDURE NOTES
CSE    Patient location during procedure: OR  Start time: 3/6/2017 6:41 AM  Timeout: 3/6/2017 6:40 AM  End time: 3/6/2017 6:48 AM  Staffing  Anesthesiologist: WARD FREDERICK JR  Resident/CRNA: LOGAN GUSTAFSON  Other anesthesia staff: CHANEL SIMMONS  Performed by: resident/CRNA   Preanesthetic Checklist  Completed: patient identified, site marked, surgical consent, pre-op evaluation, timeout performed, IV checked, risks and benefits discussed and monitors and equipment checked  CSE  Patient position: sitting  Prep: ChloraPrep  Patient monitoring: continuous pulse ox and frequent blood pressure checks  Approach: midline  Spinal Needle  Needle type: Tashi   Needle gauge: 25 G  Needle length: 5 in  Epidural Needle  Injection technique: FERNANDO saline  Needle type: Tuohy   Needle gauge: 17 G  Needle length: 3.5 in  Needle insertion depth: 8 cm  Location: L3-4  Needle localization: anatomical landmarks  Catheter  Catheter type: springwound  Catheter size: 19 G  Catheter at skin depth: 12 cm  Assessment  Sensory level: T8   Dermatomal levels determined by pinch or prick  Intrathecal Medications:  Bolus administered: 1.6 mL of 0.75 and with dextrose bupivacaine  administered: primary anesthetic and 10 mcg of  fentanyl

## 2017-03-06 NOTE — DISCHARGE INSTRUCTIONS
Breastfeeding Discharge Instructions       Feed the baby at the earliest sign of hunger or comfort  o Hands to mouth, sucking motions  o Rooting or searching for something to suck on  o Dont wait for crying - it is a late sign of hunger and comfort.     The feedings may be 8-12 times per 24hrs and will not follow a schedule   Avoid pacifiers and bottles for the first 4 weeks   Alternate the breast you start the feeding with, or start with the breast that feels the fullest   Switch breasts when the baby takes himself off the breast or falls asleep   Keep offering breasts until the baby looks full, no longer gives hunger signs, and stays asleep when placed on his back in the crib   If the baby is sleepy and wont wake for a feeding, put the baby skin-to-skin dressed in a diaper against the mothers bare chest   Sleep near your baby   The baby should be positioned and latched on to the breast correctly  o Chest-to-chest, chin in the breast  o Babys lips are flipped outward  o Babys mouth is stretched open wide like a shout  o Babys sucking should feel like tugging to the mother  - The baby should be drinking at the breast:  o You should hear swallowing or gulping throughout the feeding  o You should see milk on the babys lips when he comes off the breast  o Your breasts should be softer when the baby is finished feeding  o The baby should look relaxed at the end of feedings  o After the 4th day and your milk is in:  o The babys poop should turn bright yellow and be loose, watery, and seedy  o The baby should have at least 3-4 poops the size of the palm of your hand per day  o The baby should have at least 6-8 wet diapers per day  o The urine should be light yellow in color  You should drink when you are thirsty and eat a healthy diet when you are    hungry.     Take naps to get the rest you need.   Take medications and/or drink alcohol only with permission of your obstetrician    or the babys  pediatrician.  You can also call the [facility] Risk [facility],   ([phone]), Monday-Friday, 8am-5pm Central time, to get the most   up-to-date evidence-based information on the use of medications during   pregnancy and breastfeeding.      The baby should be examined by a pediatrician at 3-5 days of age.  Once your   milk comes in, the baby should be gaining at least ½ - 1oz each day and should be back to birthweight no later than 10-14 days of age.          Community Resources    [facility] Breastfeeding Warmline: 5444995879    Local [facility] clinics: provide incentives and breastpumps to eligible mothers  La Leche League International (LLLI):  mother-to-mother support group website        www.[employer]  Local La Leche League mother-to-mother support groups:        www.[employer]lesiant.[employer]        [facility] Leche [facility]         www.gema@StreetÂ LibraryÂ Network.com  Dr. Aston Brenner website for latch videos and general information:        www.breastfeedinginc.ca  [facility] Risk [facility] is a call center that provides information about the safety of taking medications while breastfeeding.  Call 8-763-698-6621, M-F, 8am-5pm, [place].  International Lactation Consultant Association provides resources for assistance:        www.ilca.org  [place] Breastfeeding Coalition provides informationand resources for parents  and the community    http://[facility] Centerbreastfeeding.org     [employer] mom provides resources for assistance: 148.768.8956        www.nolamom.org  [employer] for Healthy Babies:  0-105-635-BABY(7862)  [facility] provides a list of breastfeeding services by zip code:        www.[facility] Service UnitDealerRater.org  [employer]:  376.517.4594 a breastfeeding support group for women of color

## 2017-03-06 NOTE — ANESTHESIA PREPROCEDURE EVALUATION
2017  Zhanna Carlton is a 31 y.o., female.     at 39w2d here for repeat  x2 and BTL. Pregnancy complicated by gestational diabetes, and leaking of fluid in 2nd trimester which has since resolved.     Zhanna Carlton is a 31 y.o. female     OB History    Para Term  AB SAB TAB Ectopic Multiple Living   3 2 1 1      2      # Outcome Date GA Lbr Michael/2nd Weight Sex Delivery Anes PTL Lv   3 Current            2  08 36w5d  3.358 kg (7 lb 6.5 oz) M CS-LTranv Spinal  Y      Birth Comments: Decreased FM, Nonreassuring FHR testing.   1 Term 05 39w5d  3.941 kg (8 lb 11 oz)  CS-LTranv   Y      Complications: Failed induction,Pre-eclampsia          Wt Readings from Last 1 Encounters:   17 0527 116 kg (255 lb 11.7 oz)       BP Readings from Last 3 Encounters:   17 118/70   17 130/80   17 122/74       Patient Active Problem List   Diagnosis    Bleeding in early pregnancy    31 weeks gestation of pregnancy    Family history of congenital heart defect    Family history of open neural tube defect    G6PD deficiency    History of pre-eclampsia in prior pregnancy, currently pregnant in third trimester    Previous  section complicating pregnancy    Labor, false (Sipsey-Zurita), antepartum    Vaginal discharge in pregnancy in second trimester    Gestational diabetes mellitus (GDM) in third trimester    Obesity affecting pregnancy in third trimester    Group B Streptococcus carrier, +RV culture, currently pregnant    Gestational diabetes mellitus (GDM) affecting third pregnancy    Term pregnancy       Past Surgical History:   Procedure Laterality Date     SECTION      x2       Social History     Social History    Marital status: Single     Spouse name: N/A    Number of children: N/A    Years of education: N/A      Occupational History    Not on file.     Social History Main Topics    Smoking status: Former Smoker    Smokeless tobacco: Never Used    Alcohol use No    Drug use: No    Sexual activity: Yes     Partners: Male     Birth control/ protection: None     Other Topics Concern    Not on file     Social History Narrative         Chemistry        Component Value Date/Time     02/07/2017 0910    K 4.3 02/07/2017 0910     02/07/2017 0910    CO2 21 (L) 02/07/2017 0910    BUN 6 02/07/2017 0910    CREATININE 0.6 02/07/2017 0910    GLU 95 02/07/2017 0910        Component Value Date/Time    CALCIUM 8.8 02/07/2017 0910    ALKPHOS 137 (H) 02/07/2017 0910    AST 11 02/07/2017 0910    ALT 14 02/07/2017 0910    BILITOT 0.1 02/07/2017 0910            Lab Results   Component Value Date    WBC 10.53 02/07/2017    HGB 10.9 (L) 02/07/2017    HCT 34.3 (L) 02/07/2017    MCV 85 02/07/2017     02/07/2017       No results for input(s): INR, PROTIME, APTT in the last 72 hours.    Invalid input(s): PT                  [facility] Anesthesia Evaluation    I have reviewed the Patient Summary Reports.    I have reviewed the Nursing Notes.   I have reviewed the Medications.     Review of Systems  Anesthesia Hx:  History of prior surgery of interest to airway management or planning: Denies Family Hx of Anesthesia complications.   Denies Personal Hx of Anesthesia complications.   EENT/Dental:EENT/Dental Normal   Cardiovascular:  Cardiovascular Normal     Pulmonary:  Pulmonary Normal    Renal/:  Renal/ Normal     Hepatic/GI:   GERD Denies Liver Disease. Denies Hepatitis.    Musculoskeletal:  Musculoskeletal Normal    OB/GYN/PEDS:  H/o post-partum pre-  E   Neurological:  Neurology Normal    Endocrine:   Diabetes, well controlled, gestational Denies Hypothyroidism.        Physical Exam  General:  Well nourished    Airway/Jaw/Neck:  Airway Findings: Mouth Opening: Normal Tongue: Normal  General Airway Assessment: Adult   Mallampati: III  TM Distance: Normal, at least 6 cm  Jaw/Neck Findings:  Neck ROM: Normal ROM      Dental:  Dental Findings: In tact   Chest/Lungs:  Chest/Lungs Findings: Normal Respiratory Rate     Heart/Vascular:  Heart Findings: Rate: Normal  Rhythm: Regular Rhythm        Mental Status:  Mental Status Findings:  Cooperative, Alert and Oriented         Anesthesia Plan  Type of Anesthesia, risks & benefits discussed:  Anesthesia Type:  CSE, epidural, general, spinal  Patient's Preference:   Intra-op Monitoring Plan:   Intra-op Monitoring Plan Comments:   Post Op Pain Control Plan:   Post Op Pain Control Plan Comments:   Induction:    Beta Blocker:  Patient is not currently on a Beta-Blocker (No further documentation required).       Informed Consent: Patient understands risks and agrees with Anesthesia plan.  Questions answered. Anesthesia consent signed with patient.  ASA Score: 3     Day of Surgery Review of History & Physical:    H&P update referred to the surgeon.         Ready For Surgery From Anesthesia Perspective.

## 2017-03-06 NOTE — ANESTHESIA PREPROCEDURE EVALUATION
2017  Zhanna Carlton is a 31 y.o., female.     at 39w2d here for repeat  x2 and BTL. Pregnancy complicated by gestational diabetes, and leaking of fluid in 2nd trimester which has since resolved.     Zhanna Carlton is a 31 y.o. female     OB History    Para Term  AB SAB TAB Ectopic Multiple Living   3 2 1 1      2      # Outcome Date GA Lbr Michael/2nd Weight Sex Delivery Anes PTL Lv   3 Current            2  08 36w5d  3.358 kg (7 lb 6.5 oz) M CS-LTranv Spinal  Y      Birth Comments: Decreased FM, Nonreassuring FHR testing.   1 Term 05 39w5d  3.941 kg (8 lb 11 oz)  CS-LTranv   Y      Complications: Failed induction,Pre-eclampsia          Wt Readings from Last 1 Encounters:   17 0527 116 kg (255 lb 11.7 oz)       BP Readings from Last 3 Encounters:   17 127/70   17 118/70   17 130/80       Patient Active Problem List   Diagnosis    Bleeding in early pregnancy    Family history of congenital heart defect    Family history of open neural tube defect    G6PD deficiency    History of pre-eclampsia in prior pregnancy, currently pregnant in third trimester    Previous  section complicating pregnancy    Gestational diabetes mellitus (GDM) in third trimester    Obesity affecting pregnancy in third trimester    Group B Streptococcus carrier, +RV culture, currently pregnant    Gestational diabetes mellitus (GDM) affecting third pregnancy    Term pregnancy       Past Surgical History:   Procedure Laterality Date     SECTION      x2       Social History     Social History    Marital status: Single     Spouse name: N/A    Number of children: N/A    Years of education: N/A     Occupational History    Not on file.     Social History Main Topics    Smoking status: Former Smoker    Smokeless tobacco: Never Used     Alcohol use No    Drug use: No    Sexual activity: Yes     Partners: Male     Birth control/ protection: None     Other Topics Concern    Not on file     Social History Narrative         Chemistry        Component Value Date/Time     02/07/2017 0910    K 4.3 02/07/2017 0910     02/07/2017 0910    CO2 21 (L) 02/07/2017 0910    BUN 6 02/07/2017 0910    CREATININE 0.6 02/07/2017 0910    GLU 95 02/07/2017 0910        Component Value Date/Time    CALCIUM 8.8 02/07/2017 0910    ALKPHOS 137 (H) 02/07/2017 0910    AST 11 02/07/2017 0910    ALT 14 02/07/2017 0910    BILITOT 0.1 02/07/2017 0910            Lab Results   Component Value Date    WBC 10.53 02/07/2017    HGB 10.9 (L) 02/07/2017    HCT 34.3 (L) 02/07/2017    MCV 85 02/07/2017     02/07/2017       No results for input(s): INR, PROTIME, APTT in the last 72 hours.    Invalid input(s): PT                  [facility] Pre-op Assessment    I have reviewed the Patient Summary Reports.     I have reviewed the Nursing Notes.   I have reviewed the Medications.     Review of Systems  Anesthesia Hx:  History of prior surgery of interest to airway management or planning: Denies Family Hx of Anesthesia complications.   Denies Personal Hx of Anesthesia complications.   EENT/Dental:EENT/Dental Normal   Cardiovascular:  Cardiovascular Normal     Pulmonary:  Pulmonary Normal    Renal/:  Renal/ Normal     Hepatic/GI:   GERD Denies Liver Disease. Denies Hepatitis.    Musculoskeletal:  Musculoskeletal Normal    OB/GYN/PEDS:  H/o post-partum pre-  E   Neurological:  Neurology Normal    Endocrine:   Diabetes, well controlled, gestational Denies Hypothyroidism.        Physical Exam  General:  Well nourished    Airway/Jaw/Neck:  Airway Findings: Mouth Opening: Normal Tongue: Normal  General Airway Assessment: Adult  Mallampati: III  TM Distance: Normal, at least 6 cm  Jaw/Neck Findings:  Neck ROM: Normal ROM      Dental:  Dental Findings: In tact    Chest/Lungs:  Chest/Lungs Findings: Normal Respiratory Rate     Heart/Vascular:  Heart Findings: Rate: Normal  Rhythm: Regular Rhythm        Mental Status:  Mental Status Findings:  Cooperative, Alert and Oriented         Anesthesia Plan  Type of Anesthesia, risks & benefits discussed:  Anesthesia Type:  CSE, epidural, general, spinal  Patient's Preference:   Intra-op Monitoring Plan:   Intra-op Monitoring Plan Comments:   Post Op Pain Control Plan:   Post Op Pain Control Plan Comments:   Induction:    Beta Blocker:  Patient is not currently on a Beta-Blocker (No further documentation required).       Informed Consent: Patient understands risks and agrees with Anesthesia plan.  Questions answered. Anesthesia consent signed with patient.  ASA Score: 3     Day of Surgery Review of History & Physical:    H&P update referred to the surgeon.         Ready For Surgery From Anesthesia Perspective.

## 2017-03-06 NOTE — PLAN OF CARE
Problem: Breastfeeding (Adult,Obstetrics,Pediatric)  Goal: Signs and Symptoms of Listed Potential Problems Will be Absent, Minimized or Managed (Breastfeeding)  Signs and symptoms of listed potential problems will be absent, minimized or managed by discharge/transition of care (reference Breastfeeding (Adult,Obstetrics,Pediatric) [facility]).   Outcome: Ongoing (interventions implemented as appropriate)    03/06/17 1640   Breastfeeding   Problems Assessed (Breastfeeding) all   Problems Present (Breastfeeding) none   Lactation plan of care discussed with mother. Discussed cue based feedings, frequency, duration, I and O, and proper latch. Patient acknowledged understanding.

## 2017-03-06 NOTE — L&D DELIVERY NOTE
Section Operative Note  Procedure Date: 2017    Procedure: Repeat  Section via pfannenstiel skin incision w/ MacArthur BT    Indications: previous LTCS    Pre-operative Diagnosis: IUP at 39 week 2 day pregnancy, undesired fertility    Post-operative Diagnosis: same    Surgeon: Dr. Mosquera     Assistants: Vincent García MD (res)    Anesthesia:  Combined Spinal/Epidural anesthesia    Findings:   1. Viable male infant  2. Normal uterus  3. Normal fallopian tubes (ligated)  4. Normal ovaries  5. Minimal adhesions    Estimated Blood Loss:  520mL           Total IV Fluids: 1500 mL     UOP: 160 mL    Specimens: single viable male infant , Placenta which was discarded, Bilateral tubal segments    PreOp CBC:   Lab Results   Component Value Date    WBC 12.29 2017    HGB 10.8 (L) 2017    HCT 34.1 (L) 2017    MCV 86 2017     2017                     Complications:  None; patient tolerated the procedure well.           Disposition: PACU - hemodynamically stable.           Condition: stable    Procedure Details   The patient was seen in the Holding Room. The risks, benefits, complications, treatment options, and expected outcomes were discussed with the patient.  The patient concurred with the proposed plan, giving informed consent.  The site of surgery properly noted/marked. The patient was taken to Operating Room, identified as Zhanna Carlton and the procedure verified as  Delivery with Bilateral Tubal Ligation. A Time Out was held and the above information confirmed.    After induction of anesthesia, the patient was prepped and draped in the usual sterile manner while placed in a dorsal supine position with a left lateral tilt.  A henderson catheter was also placed per nursing. Preoperative antibiotics were administered and an allis test was performed confirming  adequate anesthesia.  A Pfannenstiel incision was made and carried down through the subcutaneous  tissue to the fascia. Previous Pfannenstiel scar was excised. Fascial incision was made and extended transversely. The fascia was grasped with Kocher clamps and  from the underlying rectus tissue superiorly and inferiorly. The peritoneum was identified, found to be free of adherent bowl and entered sharply. Peritoneal incision was extended longitudinally with care to identify the bladder. The vesico-uterine peritoneum was identified and bladder blade was inserted. The vesico-uterine peritoneum was incised transversely and the bladder flap was bluntly freed from the lower uterine segment. The bladder blade was reinserted to keep the bladder out of the operative field. A low transverse uterine incision was made with the scalpel,  and extended with finger fracture using traction superiorly and inferiorly on the edges of the incision. The amniotic sac was ruptured with a hemostat and the infant was noted to be in cephalic position. The head was brought to the incision using the assistance of a vacuum cup placed on the posterior occiput, and elevated out of the pelvis. The patient delivered a single viable male infant with some difficulty.  Infant weighed 3740 grams with Apgar scores of 9/9 at one and five minutes respectively. After the umbilical cord was clamped and cut cord blood was obtained for evaluation. The placenta was removed intact and appeared normal and was discarded.  The uterine outline, tubes and ovaries appeared normal. The uterine incision was closed with running locked sutures of #1 chromic. Hemostasis was observed. The bladder flap was re approximated with 2-0 Chromic in a running fashion. The left fallopian tube was visualized and grabbed with a ulises.  An avascular window was made with the bovie in the mesosalpinx.  2-0 plain gut was passed through the window and the tube was ligated twice in an interrupted fashion on the proximal and distal ends.  A sandy was placed on either side of the  window and a 1cm portion of the tube removed.  Attention was brought to the right side.   The right fallopian tube was visualized and grabbed with a ulises.  An avascular window was made with the bovie in the mesosalpinx.  2-0 plan gut was passed through the window and the tube was ligated twice in an interrupted fashion on the proximal and distal ends.  A sandy was placed on either side of the window and a 1cm portion of the tube removed.  The abdominal cavity was copiously  irrigated with sterile normal saline to remove clots.  The peritoneum  was reapproximated with  2-0 vicryl in a running fashion, and the rectus muscles were reapproximated with interrupted sutures of #1 chromic . The fascia was then reapproximated with running sutures of #1 PDS beginning at each apex and meeting at the middle of the incision. The subcutaneous layer was reapproximated with 2-0 Vicryl interrupted sutures; and the skin was reapproximated with 4-0 monocryl in a running subuticular fashion .  A penrose drain was placed in the subq layer with the exit to the Left of the incision prior to skin closure. Sterile dressing was applied..    Instrument, sponge, and needle counts were correct prior the abdominal closure and at the conclusion of the case.     Pt tolerated procedure well and the patient was sent to the Recovery area in stable condition. Dr. Oropeza discussed with family that pt was stable and in good condition after the procedure.    Kyra Ngo M.D.  PGY-2 OBGYN  [phone]      Delivery Information for  Juan J Carlton    Birth information:  YOB: 2019   Time of birth: 7:18 AM   Sex: male   Head Delivery Date/Time: 3/6/2017  7:18 AM   Delivery type: , Low Transverse   Gestational Age: 39w2d    Delivery Providers    Delivering clinician:  DRU OROPEZA   Other personnel:   Provider Role   KYRA NGO, GEMMA ALBERTS, NABIL GOLDEN                     Measurements    Weight:  3740 g Length:  50.2 cm   Head circum.:  35.6 cm Chest circum.:  37.5 cm          [place] Assessment    Living status:  Yes   Apgars    1 Minute:   5 Minute:   10 Minute 15 Minute 20 Minute   Skin Color: 1  1       Heart Rate: 2  2       Reflex Irritability: 2  2       Muscle Tone: 2  2       Respiratory Effort: 2  2       Total: 9  9                  Apgars Assigned By:  XIOMARA GRIGGS          Assisted Delivery Details:    Forceps attempted?:  No   Vacuum extractor attempted?:  Yes   Vacuum type:  flat   First attempt time vacuum applied:  3/6/2017 0718   First attempt time vacuum removed:  3/6/2017 0718   Number of pop offs:  0   Number of pulls with vacuum:  1   Vacuum applied by:  DR. OROPEZA   Failed vacuum delivery?:  No             Shoulder Dystocia    Shoulder dystocia present?:  No                                             Presentation and Position    Presentation: Vertex   Position:                    Interventions/Resuscitation    Method:  Bulb Suctioning, Tactile Stimulation        Cord    Vessels:  3 vessels   Complications:  None   Delayed Cord Clamping?:  No   Cord Blood Disposition:  Sent with Baby   Gases Sent?:  Yes   Stem Cell Collection (by MD):  No        Placenta    Date and time:  3/6/2017  7:19 AM   Removal:  Manual removal   Appearance:  Intact   Placenta disposition:  Discarded            Labor Events:       labor: No     Labor Onset Date/Time:         Dilation Complete Date/Time:         Start Pushing Date/Time:       Rupture Date/Time:              Rupture type:           Fluid Amount:        Fluid Color:        Fluid Odor:        Membrane Status (PeriCalm):        Rupture Date/Time (PeriCalm):        Fluid Amount (PeriCalm):        Fluid Color (PeriCalm):         steroids:       Antibiotics given for GBS: No     Induction:       Indications for induction:        Augmentation:       Indications for augmentation:       Labor  complications: None     Additional complications:          Cervical ripening:                     Delivery:      Episiotomy:       Indication for Episiotomy:       Perineal Lacerations:   Repaired:      Periurethral Laceration:   Repaired:     Labial Laceration:   Repaired:     Sulcus Laceration:   Repaired:     Vaginal Laceration:   Repaired:     Cervical Laceration:   Repaired:     Repair suture:       Repair # of packets: 10     Blood loss (ml):       Vaginal Sweep Performed:       Surgicount Correct: Yes       Other providers:       Anesthesia    Method:  Epidural              Details (if applicable):  Trial of Labor No    Categorization: Repeat    Priority: Routine   Indications for : Repeat Section   Incision Type: Low Transverse     Additional  information:  Forceps:    Vacuum:    Breech:    Observed anomalies    Other (Comments): Postpartum BTL @ time of        Staff OB-GYN:  Merari Alcantar MD

## 2017-03-06 NOTE — PROGRESS NOTES
POSTPARTUM PROGRESS NOTE     Zhanna Carlton is a 31 y.o. female POD #1 status post Repeat  section with BTL at 39w2d in a pregnancy complicated by gestational diabetes, GBS+, history of preE in a prior pregnancy and unwanted fertility.     Patient is doing well this morning. She denies nausea, vomiting, fever or chills.  Patient reports mild abdominal pain that is adequately relieved by oral pain medications. Lochia is mild to moderate  and stable. Patient is voiding without difficulty and ambulating with no difficulty. She has passed flatus, and has not had BM. Patient does plan to breast feed. S/P BTL for contraception. She desires circumcision.    Objective:       Temp:  [96.9 °F (36.1 °C)-98.5 °F (36.9 °C)] 98.2 °F (36.8 °C)  Pulse:  [74-91] 91  Resp:  [16-18] 18  SpO2:  [96 %-99 %] 97 %  BP: (119-143)/(58-84) 123/81    General:   alert, appears stated age and cooperative   Lungs:   clear to auscultation bilaterally   Heart:   regular rate and rhythm, S1, S2 normal, no murmur, click, rub or gallop   Abdomen:  soft, non-tender; bowel sounds normal; no masses,  no organomegaly   Uterus:  firm located at the umblicus.    Incision: Bandage in place, incision is clean, dry and intact   Extremities: peripheral pulses normal, no pedal edema, no clubbing or cyanosis     Lab Review  Recent Results (from the past 4 hour(s))   Glucose, fasting    Collection Time: 17  5:31 AM   Result Value Ref Range    Glucose, Fasting 95 70 - 110 mg/dL   CBC auto differential    Collection Time: 17  5:31 AM   Result Value Ref Range    WBC 12.08 3.90 - 12.70 K/uL    RBC 3.51 (L) 4.00 - 5.40 M/uL    Hemoglobin 9.4 (L) 12.0 - 16.0 g/dL    Hematocrit 29.9 (L) 37.0 - 48.5 %    MCV 85 82 - 98 fL    MCH 26.8 (L) 27.0 - 31.0 pg    MCHC 31.4 (L) 32.0 - 36.0 %    RDW 16.0 (H) 11.5 - 14.5 %    Platelets 229 150 - 350 K/uL    MPV 10.1 9.2 - 12.9 fL    Gran # 8.4 (H) 1.8 - 7.7 K/uL    Lymph # 2.2 1.0 - 4.8 K/uL    Mono # 0.9  0.3 - 1.0 K/uL    Eos # 0.4 0.0 - 0.5 K/uL    Baso # 0.02 0.00 - 0.20 K/uL    Gran% 69.2 38.0 - 73.0 %    Lymph% 17.9 (L) 18.0 - 48.0 %    Mono% 7.7 4.0 - 15.0 %    Eosinophil% 3.3 0.0 - 8.0 %    Basophil% 0.2 0.0 - 1.9 %    Differential Method Automated    POCT glucose    Collection Time: 17  5:36 AM   Result Value Ref Range    POC Glucose 95 70 - 110 mg/dL     I/O    Intake/Output Summary (Last 24 hours) at 17 0650  Last data filed at 17 0545   Gross per 24 hour   Intake             1000 ml   Output             1440 ml   Net             -440 ml      Assessment:     Patient Active Problem List   Diagnosis    Bleeding in early pregnancy    Family history of congenital heart defect    Family history of open neural tube defect    G6PD deficiency    History of pre-eclampsia in prior pregnancy, currently pregnant in third trimester    Previous  section complicating pregnancy    Gestational diabetes mellitus (GDM) in third trimester    Obesity affecting pregnancy in third trimester    Group B Streptococcus carrier, +RV culture, currently pregnant    Gestational diabetes mellitus (GDM) affecting third pregnancy    S/P repeat low transverse     Admission for sterilization     Plan:   1. Postpartum care:  - Patient doing well. Continue routine management and advances.  - Continue PO pain meds. Pain well controlled.  - Heme: H/h     Recent Labs  Lab 17  0525 17  0531   WBC 12.29 12.08   HGB 10.8* 9.4*   HCT 34.1* 29.9*   MCV 86 85    229     - Rh positive  - Encourage ambulation  - Circumcision desired, consents signed to chart  - Contraception: s/p BTL  - Lactation consult PRN    2. gDM  - post-partum fasting glucose 95    3. GBS  - s/p GBS ppx    4. Mild asymptomatic ABLA  - Fe, colace  - VSS  - continue prenatal vitamins in pp period    Dispo: As patient meets milestones, will plan to discharge POD #2-4.

## 2017-03-06 NOTE — IP AVS SNAPSHOT
[facility] Location ([facility])  [address] [place], [place] [id number]  Phone: [phone]           Patient Discharge Instructions     Our goal is to set you up for success. This packet includes information on your condition, medications, and your home care. It will help you to care for yourself so you don't get sicker and need to go back to the hospital.     Please ask your nurse if you have any questions.        There are many details to remember when preparing to leave the hospital. Here is what you will need to do:    1. Take your medicine. If you are prescribed medications, review your Medication List in the following pages. You may have new medications to  at the pharmacy and others that you'll need to stop taking. Review the instructions for how and when to take your medications. Talk with your doctor or nurses if you are unsure of what to do.     2. Go to your follow-up appointments. Specific follow-up information is listed in the following pages. Your may be contacted by a transition nurse or clinical provider about future appointments. Be sure we have all of the phone numbers to reach you, if needed. Please contact your provider's office if you are unable to make an appointment.     3. Watch for warning signs. Your doctor or nurse will give you detailed warning signs to watch for and when to call for assistance. These instructions may also include educational information about your condition. If you experience any of warning signs to your health, call your doctor.               [facility]  Unless otherwise directed by your provider, please contact [facility], our nurse care line that is available for 24/7 assistance.     [phone] (toll-free)    Registered nurses in the [facility] provide clinical advisement, health education, appointment booking, and other advisory services.                    ** Verify the list of medication(s) below is accurate and up to  date. Carry this with you in case of emergency. If your medications have changed, please notify your healthcare provider.             Medication List      START taking these medications        Additional Info                      ibuprofen 600 MG tablet   Commonly known as:  ADVIL,MOTRIN   Quantity:  30 tablet   Refills:  2   Dose:  600 mg    Last time this was given:  600 mg on 3/9/2017 12:18 PM   Instructions:  Take 1 tablet (600 mg total) by mouth 3 (three) times daily.     Begin Date    AM    Noon    PM    Bedtime       oxycodone 5 MG immediate release tablet   Commonly known as:  ROXICODONE   Quantity:  20 tablet   Refills:  0   Dose:  5 mg    Last time this was given:  10 mg on 3/7/2017  7:58 AM   Instructions:  Take 1 tablet (5 mg total) by mouth every 4 (four) hours as needed.     Begin Date    AM    Noon    PM    Bedtime         CONTINUE taking these medications        Additional Info                      hydrocortisone 2.5 % rectal cream   Commonly known as:  ANUSOL-HC   Quantity:  30 g   Refills:  1    Instructions:  Apply rectally 2 times daily     Begin Date    AM    Noon    PM    Bedtime       prenatal cmb#95-iron-FA-dha 28 mg iron-800 mcg-200 mg Cmpk   Refills:  0    Instructions:  Take by mouth.     Begin Date    AM    Noon    PM    Bedtime       VITAMIN C 100 MG tablet   Refills:  0   Dose:  100 mg   Generic drug:  ascorbic acid (vitamin C)    Instructions:  Take 100 mg by mouth once daily.     Begin Date    AM    Noon    PM    Bedtime         STOP taking these medications     aspirin 81 MG EC tablet   Commonly known as:  ECOTRIN       blood sugar diagnostic Strp       lancets 28 gauge Misc   Commonly known as:  FREESTYLE LANCETS            Where to Get Your Medications      These medications were sent to [facility] - [place] - [email or web address] [address]     Phone:  [phone]     ibuprofen 600 MG tablet         You can get these medications  from any pharmacy     Bring a paper prescription for each of these medications     oxycodone 5 MG immediate release tablet                  Please bring to all follow up appointments:    1. A copy of your discharge instructions.  2. All medicines you are currently taking in their original bottles.  3. Identification and insurance card.    Please arrive 15 minutes ahead of scheduled appointment time.    Please call 24 hours in advance if you must reschedule your appointment and/or time.        Your Scheduled Appointments     Mar 23, 2017 11:30 AM CDT   Post OP with Merari Mosquera MD   [facility] [place] [address] ([facility])    [address] [place] [id number] [phone]            Apr 18, 2017  8:30 AM CDT   Post Partum with Merari Mosquera MD   [facility] [place] [address] ([facility])    [address] [place] [id number] [phone]              Follow-up Information     Follow up with Merari Mosquera MD In 1 week.    Specialties:  Obstetrics, Obstetrics and Gynecology    Why:  BP check    Contact information:    [address] [place] [id number] [phone]          Follow up with Merari Mosquera MD In 6 weeks.    Specialties:  Obstetrics, Obstetrics and Gynecology    Why:  Post-partum check-up    Contact information:    [address] [place] [id number] [phone]          Discharge Instructions     Future Orders    Activity as tolerated     Call MD for:  difficulty breathing or increased cough     Call MD for:  increased confusion or weakness     Call MD for:  persistent dizziness, light-headedness, or visual disturbances     Call MD for:  persistent nausea and vomiting or diarrhea     Call MD for:  severe persistent headache     Call MD for:  severe uncontrolled pain     Call MD for:  temperature >100.4     Call MD for:  worsening rash     Diet general     Questions:    Total calories:      Fat restriction, if any:      Protein  restriction, if any:      Na restriction, if any:      Fluid restriction:      Additional restrictions:          Discharge Instructions       Breastfeeding Discharge Instructions       Feed the baby at the earliest sign of hunger or comfort  o Hands to mouth, sucking motions  o Rooting or searching for something to suck on  o Dont wait for crying - it is a late sign of hunger and comfort.     The feedings may be 8-12 times per 24hrs and will not follow a schedule   Avoid pacifiers and bottles for the first 4 weeks   Alternate the breast you start the feeding with, or start with the breast that feels the fullest   Switch breasts when the baby takes himself off the breast or falls asleep   Keep offering breasts until the baby looks full, no longer gives hunger signs, and stays asleep when placed on his back in the crib   If the baby is sleepy and wont wake for a feeding, put the baby skin-to-skin dressed in a diaper against the mothers bare chest   Sleep near your baby   The baby should be positioned and latched on to the breast correctly  o Chest-to-chest, chin in the breast  o Babys lips are flipped outward  o Babys mouth is stretched open wide like a shout  o Babys sucking should feel like tugging to the mother  - The baby should be drinking at the breast:  o You should hear swallowing or gulping throughout the feeding  o You should see milk on the babys lips when he comes off the breast  o Your breasts should be softer when the baby is finished feeding  o The baby should look relaxed at the end of feedings  o After the 4th day and your milk is in:  o The babys poop should turn bright yellow and be loose, watery, and seedy  o The baby should have at least 3-4 poops the size of the palm of your hand per day  o The baby should have at least 6-8 wet diapers per day  o The urine should be light yellow in color  You should drink when you are thirsty and eat a healthy diet when you are    hungry.      Take naps to get the rest you need.   Take medications and/or drink alcohol only with permission of your obstetrician    or the babys pediatrician.  You can also call the Infant Risk Center,   ([phone]), Monday-Friday, 8am-5pm Central time, to get the most   up-to-date evidence-based information on the use of medications during   pregnancy and breastfeeding.      The baby should be examined by a pediatrician at 3-5 days of age.  Once your   milk comes in, the baby should be gaining at least ½ - 1oz each day and should be back to birthweight no later than 10-14 days of age.          Community Resources    [facility] Breastfeeding Warmline: 3992071763    Local [facility] clinics: provide incentives and breastpumps to eligible mothers  La Leche League International (LLLI):  mother-to-mother support group website        www.Zoyil.Couchy.com  Local La Leche League mother-to-mother support groups:        www.Mendor        La Leche [facility]         www.gema@1-800-DENTIST.com  Dr. Aston Brenner website for latch videos and general information:        www.breastfeedinginc.ca  Infant Risk Center is a call center that provides information about the safety of taking medications while breastfeeding.  Call [phone], M-F, 8am-5pm, CT.  International Lactation Consultant Association provides resources for assistance:        www.ilca.org  [place] Breastfeeding Coalition provides informationand resources for parents  and the community    http://lou[place]ianabreastfeeding.org     [employer] mom provides resources for assistance: 700.628.8885        www.nolamom.org  [employer] for Healthy Babies:  7-503-525-BABY(9850)  CD[employer] provides a list of breastfeeding services by zip code:        www.[facility] ServicesHDF.Couchy.com  Cafe au Lait:  334.552.6113 a breastfeeding support group for women of color        Primary Diagnosis     Your primary diagnosis was:  Status Post Repeat Low Transverse  Section      Admission  "Information     Date & Time Provider Department CSN    3/6/2017  5:10 AM Merari Mosquera MD [facility] [id number]      Care Providers     Provider Role Specialty Primary office phone    Merari Mosquera MD Attending Provider Obstetrics [phone]    Esther Preciado MD Consulting Physician  Obstetrics and Gynecology [phone]    Merari Mosquera MD Surgeon  Obstetrics [phone]      Your Vitals Were     BP Pulse Temp Resp Height Weight    124/82 110 99.2 °F (37.3 °C) (Oral) 18 5' 5" (1.651 m) 116 kg (255 lb 11.7 oz)    Last Period SpO2 BMI          06/04/2016 (Exact Date) 97% 42.56 kg/m2        Recent Lab Values     No lab values to display.      Allergies as of 3/9/2017     No Known Allergies      Advance Directives     An advance directive is a document which, in the event you are no longer able to make decisions for yourself, tells your healthcare team what kind of treatment you do or do not want to receive, or who you would like to make those decisions for you.  If you do not currently have an advance directive, [facility] encourages you to create one.  For more information call:  [phone]), [phone] ([phone]),  or log on to [email or web address].        Language Assistance Services     ATTENTION: Language assistance services are available, free of charge. Please call [phone].      ATENCIÓN: Si habla español, tiene a atkinson disposición servicios gratuitos de asistencia lingüística. Llame al [phone].     CHÚ Ý: N?u b?n nói Ti?ng Vi?t, có các d?ch v? h? tr? ngôn ng? mi?n phí dành cho b?n. G?i s? [phone].        Diabetes Discharge Instructions                                    [facility] complies with applicable Federal civil rights laws and does not discriminate on the basis of race, color, national origin, age, disability, or sex.        "

## 2017-03-06 NOTE — TRANSFER OF CARE
"Anesthesia Transfer of Care Note    Patient: Zhanna Carlton    Procedure(s) Performed: Procedure(s) (LRB):  DELIVERY- SECTION (N/A)  LIGATION-BILATERAL TUBAL (Bilateral)    Patient location: PACU    Anesthesia Type: CSE    Transport from OR: Transported from OR on room air with adequate spontaneous ventilation    Post pain: adequate analgesia    Post assessment: no apparent anesthetic complications and tolerated procedure well    Post vital signs: stable    Level of consciousness: awake and alert    Nausea/Vomiting: no nausea/vomiting    Complications: none          Last vitals:   Visit Vitals    /70 (BP Location: Right arm, Patient Position: Lying, BP Method: Automatic)    Pulse 90    Temp 36.6 °C (97.9 °F) (Temporal)    Resp 18    Ht 5' 5" (1.651 m)    Wt 116 kg (255 lb 11.7 oz)    LMP 2016 (Exact Date)    SpO2 98%    Breastfeeding No    BMI 42.56 kg/m2     "

## 2017-03-07 ENCOUNTER — TELEPHONE (OUTPATIENT)
Dept: OBSTETRICS AND GYNECOLOGY | Facility: CLINIC | Age: 32
End: 2017-03-07

## 2017-03-07 LAB
BASOPHILS # BLD AUTO: 0.02 K/UL
BASOPHILS NFR BLD: 0.2 %
DIFFERENTIAL METHOD: ABNORMAL
EOSINOPHIL # BLD AUTO: 0.4 K/UL
EOSINOPHIL NFR BLD: 3.3 %
ERYTHROCYTE [DISTWIDTH] IN BLOOD BY AUTOMATED COUNT: 16 %
GLUCOSE SERPL-MCNC: 95 MG/DL
GLUCOSE SERPL-MCNC: 95 MG/DL (ref 70–110)
HCT VFR BLD AUTO: 29.9 %
HGB BLD-MCNC: 9.4 G/DL
LYMPHOCYTES # BLD AUTO: 2.2 K/UL
LYMPHOCYTES NFR BLD: 17.9 %
MCH RBC QN AUTO: 26.8 PG
MCHC RBC AUTO-ENTMCNC: 31.4 %
MCV RBC AUTO: 85 FL
MONOCYTES # BLD AUTO: 0.9 K/UL
MONOCYTES NFR BLD: 7.7 %
NEUTROPHILS # BLD AUTO: 8.4 K/UL
NEUTROPHILS NFR BLD: 69.2 %
PLATELET # BLD AUTO: 229 K/UL
PMV BLD AUTO: 10.1 FL
POCT GLUCOSE: 95 MG/DL (ref 70–110)
RBC # BLD AUTO: 3.51 M/UL
WBC # BLD AUTO: 12.08 K/UL

## 2017-03-07 PROCEDURE — 25000003 PHARM REV CODE 250: Performed by: OBSTETRICS & GYNECOLOGY

## 2017-03-07 PROCEDURE — 85025 COMPLETE CBC W/AUTO DIFF WBC: CPT

## 2017-03-07 PROCEDURE — 36415 COLL VENOUS BLD VENIPUNCTURE: CPT

## 2017-03-07 PROCEDURE — 36000680 HC C/S TUBAL LIGATION LEVEL I

## 2017-03-07 PROCEDURE — 11000001 HC ACUTE MED/SURG PRIVATE ROOM

## 2017-03-07 PROCEDURE — 82947 ASSAY GLUCOSE BLOOD QUANT: CPT

## 2017-03-07 PROCEDURE — 27201127 HC VACUUM EXTRACTOR

## 2017-03-07 PROCEDURE — 25000003 PHARM REV CODE 250: Performed by: STUDENT IN AN ORGANIZED HEALTH CARE EDUCATION/TRAINING PROGRAM

## 2017-03-07 PROCEDURE — 63600175 PHARM REV CODE 636 W HCPCS: Performed by: ANESTHESIOLOGY

## 2017-03-07 PROCEDURE — 51702 INSERT TEMP BLADDER CATH: CPT

## 2017-03-07 PROCEDURE — 25000003 PHARM REV CODE 250: Performed by: ANESTHESIOLOGY

## 2017-03-07 RX ORDER — IBUPROFEN 600 MG/1
600 TABLET ORAL EVERY 6 HOURS PRN
Status: DISCONTINUED | OUTPATIENT
Start: 2017-03-07 | End: 2017-03-09 | Stop reason: HOSPADM

## 2017-03-07 RX ADMIN — ACETAMINOPHEN 650 MG: 325 TABLET ORAL at 07:03

## 2017-03-07 RX ADMIN — IBUPROFEN 600 MG: 600 TABLET, FILM COATED ORAL at 08:03

## 2017-03-07 RX ADMIN — OXYCODONE HYDROCHLORIDE AND ACETAMINOPHEN 1 TABLET: 10; 325 TABLET ORAL at 12:03

## 2017-03-07 RX ADMIN — SIMETHICONE CHEW TAB 80 MG 80 MG: 80 TABLET ORAL at 12:03

## 2017-03-07 RX ADMIN — KETOROLAC TROMETHAMINE 30 MG: 30 INJECTION, SOLUTION INTRAMUSCULAR at 12:03

## 2017-03-07 RX ADMIN — ACETAMINOPHEN 650 MG: 325 TABLET ORAL at 12:03

## 2017-03-07 RX ADMIN — OXYCODONE HYDROCHLORIDE 10 MG: 5 TABLET ORAL at 07:03

## 2017-03-07 RX ADMIN — IBUPROFEN 600 MG: 600 TABLET, FILM COATED ORAL at 01:03

## 2017-03-07 RX ADMIN — OXYCODONE HYDROCHLORIDE AND ACETAMINOPHEN 1 TABLET: 10; 325 TABLET ORAL at 03:03

## 2017-03-07 RX ADMIN — SIMETHICONE CHEW TAB 80 MG 80 MG: 80 TABLET ORAL at 03:03

## 2017-03-07 RX ADMIN — OXYCODONE HYDROCHLORIDE AND ACETAMINOPHEN 1 TABLET: 10; 325 TABLET ORAL at 08:03

## 2017-03-07 RX ADMIN — OXYCODONE HYDROCHLORIDE 10 MG: 5 TABLET ORAL at 03:03

## 2017-03-07 RX ADMIN — DOCUSATE SODIUM 200 MG: 100 CAPSULE, LIQUID FILLED ORAL at 07:03

## 2017-03-07 RX ADMIN — IBUPROFEN 600 MG: 600 TABLET, FILM COATED ORAL at 07:03

## 2017-03-07 RX ADMIN — DOCUSATE SODIUM 200 MG: 100 CAPSULE, LIQUID FILLED ORAL at 08:03

## 2017-03-07 RX ADMIN — SIMETHICONE CHEW TAB 80 MG 80 MG: 80 TABLET ORAL at 09:03

## 2017-03-07 NOTE — LACTATION NOTE
03/07/17 1145   Maternal Infant Assessment   Breast Density soft   Areola elastic   Nipple(s) everted   Infant Assessment   Sucking Reflex present   Rooting Reflex present   Swallow Reflex present   LATCH Score   Latch 1-->repeated attempts, holds nipple in mouth, stimulate to suck   Audible Swallowing 2-->spontaneous and intermittent (24 hrs old)   Type Of Nipple 2-->everted (after stimulation)   Comfort (Breast/Nipple) 1-->filling, red/small blisters/bruises, mild/mod discomfort   Hold (Positioning) 1-->minimal assist, teach one side: mother does other, staff holds   Score (less than 7 for 2/more consecutive times, consult Lactation Consultant) 7   Maternal Infant Feeding   Maternal Emotional State assist needed;anxious   Infant Positioning cross-cradle   Time Spent (min) 15-30 min   Latch Assistance yes   Feeding Infant   Audible Swallow yes   Suck/Swallow Coordination present   Lactation Interventions   Attachment Promotion breastfeeding assistance provided;counseling provided;skin-to-skin contact encouraged   Breastfeeding Assistance assisted with positioning;feeding cue recognition promoted;feeding on demand promoted;both breasts offered each feeding;feeding session observed;infant latch-on verified;infant suck/swallow verified   Maternal Breastfeeding Support encouragement offered;lactation counseling provided   Latch Promotion positioning assisted;infant moved to breast

## 2017-03-07 NOTE — ANESTHESIA POSTPROCEDURE EVALUATION
"Anesthesia Post Evaluation    Patient: Zhanna Carlton    Procedure(s) Performed: Procedure(s) (LRB):  DELIVERY- SECTION (N/A)  LIGATION-BILATERAL TUBAL (Bilateral)    Final Anesthesia Type: CSE  Patient location during evaluation: floor  Patient participation: Yes- Able to Participate  Level of consciousness: awake and alert and oriented  Post-procedure vital signs: reviewed and stable  Pain management: adequate  Airway patency: patent  PONV status at discharge: No PONV  Anesthetic complications: no      Cardiovascular status: blood pressure returned to baseline and hemodynamically stable  Respiratory status: unassisted, spontaneous ventilation and room air  Hydration status: euvolemic  Follow-up not needed.        Visit Vitals    /64    Pulse 95    Temp 36.9 °C (98.4 °F) (Oral)    Resp 18    Ht 5' 5" (1.651 m)    Wt 116 kg (255 lb 11.7 oz)    LMP 2016 (Exact Date)    SpO2 97%    Breastfeeding Yes    BMI 42.56 kg/m2       Pain/Krystian Score: Pain Rating Prior to Med Admin: 4 (3/7/2017  7:58 AM)  Pain Rating Post Med Admin: 2 (3/7/2017  4:20 AM)      "

## 2017-03-07 NOTE — PROGRESS NOTES
Patient would like to see if henderson can be taken out.  Called Dr. Guillen and told him order states to remove 1 day post-op.  Pt has adequate urine output at this time.  PER Dr. Guillen, henderson is okay to be removed at this time.

## 2017-03-08 PROCEDURE — 25000003 PHARM REV CODE 250: Performed by: STUDENT IN AN ORGANIZED HEALTH CARE EDUCATION/TRAINING PROGRAM

## 2017-03-08 PROCEDURE — 11000001 HC ACUTE MED/SURG PRIVATE ROOM

## 2017-03-08 PROCEDURE — 25000003 PHARM REV CODE 250: Performed by: OBSTETRICS & GYNECOLOGY

## 2017-03-08 RX ADMIN — DIPHENHYDRAMINE HYDROCHLORIDE 25 MG: 25 CAPSULE ORAL at 03:03

## 2017-03-08 RX ADMIN — IBUPROFEN 600 MG: 600 TABLET, FILM COATED ORAL at 10:03

## 2017-03-08 RX ADMIN — OXYCODONE HYDROCHLORIDE AND ACETAMINOPHEN 1 TABLET: 10; 325 TABLET ORAL at 01:03

## 2017-03-08 RX ADMIN — OXYCODONE HYDROCHLORIDE AND ACETAMINOPHEN 1 TABLET: 10; 325 TABLET ORAL at 09:03

## 2017-03-08 RX ADMIN — DOCUSATE SODIUM 200 MG: 100 CAPSULE, LIQUID FILLED ORAL at 09:03

## 2017-03-08 RX ADMIN — OXYCODONE HYDROCHLORIDE AND ACETAMINOPHEN 1 TABLET: 10; 325 TABLET ORAL at 05:03

## 2017-03-08 RX ADMIN — OXYCODONE HYDROCHLORIDE AND ACETAMINOPHEN 1 TABLET: 10; 325 TABLET ORAL at 04:03

## 2017-03-08 RX ADMIN — IBUPROFEN 600 MG: 600 TABLET, FILM COATED ORAL at 03:03

## 2017-03-08 RX ADMIN — IBUPROFEN 600 MG: 600 TABLET, FILM COATED ORAL at 04:03

## 2017-03-08 RX ADMIN — OXYCODONE HYDROCHLORIDE AND ACETAMINOPHEN 1 TABLET: 10; 325 TABLET ORAL at 12:03

## 2017-03-08 NOTE — PROGRESS NOTES
POSTPARTUM PROGRESS NOTE     Zhanna Carlton is a 31 y.o. female POD #2 status post Repeat  section with BTL at 39w2d in a pregnancy complicated by gestational diabetes, GBS+, history of preE in a prior pregnancy and unwanted fertility.     Patient is doing well this morning. She denies nausea, vomiting, fever or chills. Patient reports mild abdominal pain that is adequately relieved by oral pain medications. Lochia is mild and decreasing. Patient is voiding without difficulty. She complains of significant back pain and some cramping this AM and is reluctant to ambulate in the dorado. She is ambulating well to the bathroom. She has passed flatus, and has not had BM. Patient does plan to breast feed. S/P BTL for contraception. She desires circumcision, but was deferred by OBGYN staff.    Objective:       Temp:  [98.1 °F (36.7 °C)-98.7 °F (37.1 °C)] 98.7 °F (37.1 °C)  Pulse:  [87-95] 93  Resp:  [18] 18  SpO2:  [97 %-98 %] 98 %  BP: (112-131)/(64-78) 119/71    General:   alert, appears stated age and cooperative   Lungs:   clear to auscultation bilaterally   Heart:   regular rate and rhythm, S1, S2 normal, no murmur, click, rub or gallop   Abdomen:  soft, non-tender; bowel sounds normal; no masses,  no organomegaly   Uterus:  firm located at the umblicus.    Incision: incision is clean, dry and intact; no erythema/induration   Extremities: peripheral pulses normal, no pedal edema, no clubbing or cyanosis     Lab Review  No results found for this or any previous visit (from the past 4 hour(s)).  I/O  No intake or output data in the 24 hours ending 17 0638   Assessment:     Patient Active Problem List   Diagnosis    Bleeding in early pregnancy    Family history of congenital heart defect    Family history of open neural tube defect    G6PD deficiency    History of pre-eclampsia in prior pregnancy, currently pregnant in third trimester    Previous  section complicating pregnancy     Gestational diabetes mellitus (GDM) in third trimester    Obesity affecting pregnancy in third trimester    Group B Streptococcus carrier, +RV culture, currently pregnant    Gestational diabetes mellitus (GDM) affecting third pregnancy    S/P repeat low transverse     Admission for sterilization     Plan:   1. Postpartum care:  - Patient doing well. Continue routine management and advances.  - Continue PO pain meds. Pain well controlled.  - Heme: H/h     Recent Labs  Lab 17  0525 17  0531   WBC 12.29 12.08   HGB 10.8* 9.4*   HCT 34.1* 29.9*   MCV 86 85    229     - Rh positive  - Encourage ambulation; patient reluctant to ambulate  - Circumcision desired, consents signed to chart  - Contraception: s/p BTL  - Lactation consult PRN    2. gDM  - post-partum fasting glucose 95    3. GBS  - s/p GBS ppx    4. Mild asymptomatic ABLA  - Fe, colace  - VSS  - continue prenatal vitamins in pp period    Dispo: As patient meets milestones, will plan to discharge POD #3-4.    Dianne Noble MD, PhD  OBGYN, PGY-1

## 2017-03-08 NOTE — PLAN OF CARE
Problem: Breastfeeding (Adult,Obstetrics,Pediatric)  Goal: Signs and Symptoms of Listed Potential Problems Will be Absent, Minimized or Managed (Breastfeeding)  Signs and symptoms of listed potential problems will be absent, minimized or managed by discharge/transition of care (reference Breastfeeding (Adult,Obstetrics,Pediatric) CPG).   Outcome: Ongoing (interventions implemented as appropriate)  Patient will effectively breastfeed infant with comfortable, effective latch in response to infant's feeding cues 8 or more times per 24 hour period and will establish milk supply adequate to meet infant's nutritional needs. Patient will contact lactation as needed for assistance or with any questions or concerns.

## 2017-03-09 VITALS
RESPIRATION RATE: 18 BRPM | HEART RATE: 110 BPM | WEIGHT: 255.75 LBS | SYSTOLIC BLOOD PRESSURE: 124 MMHG | OXYGEN SATURATION: 97 % | DIASTOLIC BLOOD PRESSURE: 82 MMHG | BODY MASS INDEX: 42.61 KG/M2 | HEIGHT: 65 IN | TEMPERATURE: 99 F

## 2017-03-09 PROCEDURE — 25000003 PHARM REV CODE 250: Performed by: OBSTETRICS & GYNECOLOGY

## 2017-03-09 PROCEDURE — 3E0234Z INTRODUCTION OF SERUM, TOXOID AND VACCINE INTO MUSCLE, PERCUTANEOUS APPROACH: ICD-10-PCS | Performed by: OBSTETRICS & GYNECOLOGY

## 2017-03-09 PROCEDURE — 25000003 PHARM REV CODE 250: Performed by: STUDENT IN AN ORGANIZED HEALTH CARE EDUCATION/TRAINING PROGRAM

## 2017-03-09 RX ORDER — OXYCODONE HYDROCHLORIDE 5 MG/1
5 TABLET ORAL EVERY 4 HOURS PRN
Qty: 20 TABLET | Refills: 0 | Status: SHIPPED | OUTPATIENT
Start: 2017-03-09 | End: 2017-03-23

## 2017-03-09 RX ORDER — IBUPROFEN 600 MG/1
600 TABLET ORAL 3 TIMES DAILY
Qty: 30 TABLET | Refills: 2 | Status: SHIPPED | OUTPATIENT
Start: 2017-03-09 | End: 2017-04-18

## 2017-03-09 RX ADMIN — OXYCODONE HYDROCHLORIDE AND ACETAMINOPHEN 1 TABLET: 10; 325 TABLET ORAL at 06:03

## 2017-03-09 RX ADMIN — IBUPROFEN 600 MG: 600 TABLET, FILM COATED ORAL at 12:03

## 2017-03-09 RX ADMIN — DOCUSATE SODIUM 200 MG: 100 CAPSULE, LIQUID FILLED ORAL at 08:03

## 2017-03-09 RX ADMIN — OXYCODONE HYDROCHLORIDE AND ACETAMINOPHEN 1 TABLET: 10; 325 TABLET ORAL at 10:03

## 2017-03-09 RX ADMIN — OXYCODONE HYDROCHLORIDE AND ACETAMINOPHEN 1 TABLET: 10; 325 TABLET ORAL at 02:03

## 2017-03-09 RX ADMIN — IBUPROFEN 600 MG: 600 TABLET, FILM COATED ORAL at 06:03

## 2017-03-09 NOTE — DISCHARGE SUMMARY
Delivery Discharge Summary  Obstetrics      Primary OB Clinician: TAY Mosquera    Admission date: 3/6/2017  Discharge date: 2017    Disposition: To home, self care    Admit Dx:      Patient Active Problem List   Diagnosis    Bleeding in early pregnancy    Family history of congenital heart defect    Family history of open neural tube defect    G6PD deficiency    History of pre-eclampsia in prior pregnancy, currently pregnant in third trimester    Previous  section complicating pregnancy    Gestational diabetes mellitus (GDM) in third trimester    Obesity affecting pregnancy in third trimester    Group B Streptococcus carrier, +RV culture, currently pregnant    Gestational diabetes mellitus (GDM) affecting third pregnancy    S/P repeat low transverse     Admission for sterilization     Discharge Dx:    Patient Active Problem List   Diagnosis    Bleeding in early pregnancy    Family history of congenital heart defect    Family history of open neural tube defect    G6PD deficiency    History of pre-eclampsia in prior pregnancy, currently pregnant in third trimester    Previous  section complicating pregnancy    Gestational diabetes mellitus (GDM) in third trimester    Obesity affecting pregnancy in third trimester    Group B Streptococcus carrier, +RV culture, currently pregnant    Gestational diabetes mellitus (GDM) affecting third pregnancy    S/P repeat low transverse     Admission for sterilization     Procedure: , due to history of previous  section    Hospital Course:  Zhanna Carlton is a 31 y.o. now , POD #3 who was admitted on 3/6/2017 at 39w2d for scheduled repeat  section. On initial assessment, vital signs were stable and physical exam was normal. Infant was in cephalic presentation. Patient was subsequently admitted to labor and delivery unit with signed consents. Patient delivered a single viable  male.  Please see delivery note for further details. Pt was in stable condition post delivery and was transferred to the Mother-Baby Unit. Her postpartum course was uncomplicated. On discharge day, patient's pain is controlled with oral pain medications. Pt is tolerating ambulation without SOB or CP, and PO diet without N/V. Reports lochia is mild. Patient has a history of PreE in a previous pregnancy, but has remained normotensive this hospitalization. She denies any HA, vision changes, F/C, LE swelling. Denies any breast pain/soreness.    Pt in stable condition and ready for discharge. She has been instructed to continue pain medications as needed and to follow up in the OB clinic in 1 week for a BP check and in 6 weeks with her obstetrics provider.    Pertinent studies:  Postpartum CBC  Lab Results   Component Value Date    WBC 12.08 2017    HGB 9.4 (L) 2017    HCT 29.9 (L) 2017    MCV 85 2017     2017     Tubal Ligation: done with c/section  Feeding Method: breast  Rh Immune Globulin Given(O POS): N/A  Rubella Vaccine Given: N/A - immune  Tdap Vaccine Given: N/A - utd 17    Delivery:    Episiotomy:     Lacerations:     Repair suture:     Repair # of packets: 10   Blood loss (ml): 0     Birth information:  YOB: 2019   Time of birth: 7:18 AM   Sex: male   Delivery type: , Low Transverse   Gestational Age: 39w2d    Delivery Clinician:      Other providers:       Additional  information:  Forceps:    Vacuum:    Breech:    Observed anomalies      Living?:           APGARS  One minute Five minutes Ten minutes   Skin color:         Heart rate:         Grimace:         Muscle tone:         Breathing:         Totals: 9  9        Placenta: Delivered:       appearance      Patient Instructions:   Current Discharge Medication List      START taking these medications    Details   ibuprofen (ADVIL,MOTRIN) 600 MG tablet Take 1 tablet (600 mg total) by mouth 3 (three)  times daily.  Qty: 30 tablet, Refills: 2    Associated Diagnoses: Obesity affecting pregnancy in third trimester; S/P repeat low transverse ; Previous  section complicating pregnancy; Diet controlled gestational diabetes mellitus (GDM) in third trimester; History of pre-eclampsia in prior pregnancy, currently pregnant in third trimester      oxycodone (ROXICODONE) 5 MG immediate release tablet Take 1 tablet (5 mg total) by mouth every 4 (four) hours as needed.  Qty: 20 tablet, Refills: 0    Associated Diagnoses: Obesity affecting pregnancy in third trimester; S/P repeat low transverse ; Previous  section complicating pregnancy; Diet controlled gestational diabetes mellitus (GDM) in third trimester; History of pre-eclampsia in prior pregnancy, currently pregnant in third trimester         CONTINUE these medications which have NOT CHANGED    Details   ascorbic acid, vitamin C, (VITAMIN C) 100 MG tablet Take 100 mg by mouth once daily.      hydrocortisone (ANUSOL-HC) 2.5 % rectal cream Apply rectally 2 times daily  Qty: 30 g, Refills: 1    Associated Diagnoses: Hemorrhoids during pregnancy in third trimester      prenatal cmb#95-iron-FA-dha 28 mg iron-800 mcg-200 mg Cmpk Take by mouth.         STOP taking these medications       aspirin (ECOTRIN) 81 MG EC tablet Comments:   Reason for Stopping:         blood sugar diagnostic Strp Comments:   Reason for Stopping:         lancets (FREESTYLE LANCETS) 28 gauge Misc Comments:   Reason for Stopping:               Discharge Procedure Orders  Diet general     Activity as tolerated     Call MD for:  increased confusion or weakness     Call MD for:  persistent dizziness, light-headedness, or visual disturbances     Call MD for:  worsening rash     Call MD for:  severe persistent headache     Call MD for:  difficulty breathing or increased cough     Call MD for:  severe uncontrolled pain     Call MD for:  persistent nausea and vomiting or  diarrhea     Call MD for:  temperature >100.4       Follow-up Information     Follow up with Merari Mosquera MD In 1 week.    Specialties:  Obstetrics, Obstetrics and Gynecology    Why:  BP check    Contact information:    [address] [place], [place] [id number] [phone]          Follow up with Merari Mosquera MD In 6 weeks.    Specialties:  Obstetrics, Obstetrics and Gynecology    Why:  Post-partum check-up    Contact information:    [address] [id number] [phone]          Dianne Noble MD, PhD  OBGYN, PGY-1

## 2017-03-09 NOTE — PROGRESS NOTES
POSTPARTUM PROGRESS NOTE     Zhanna Carlton is a 31 y.o. female POD #3 status post Repeat  section with BTL at 39w2d in a pregnancy complicated by gestational diabetes, GBS+, history of preE in a prior pregnancy and unwanted fertility.     States this AM that sh is worried about developing PreE again this pregnancy. Last night BPmax was 134/88. She states that she would like to go home, but that in her last pregnancy, her BP slowly crept up following delivery and is worried that it will happen again. Discussed monitoring BP throughout the AM today and reassessment for DC home this afternoon.     Patient is doing well this morning. She denies nausea, vomiting, fever or chills. Patient reports mild abdominal pain that is adequately relieved by oral pain medications. Lochia is mild and stable. Patient is voiding without difficulty. She is ambulating well to the bathroom and more in the halls. She complains of some gas pain but has passed flatus; has not had BM. Patient does plan to breast feed. S/P BTL for contraception. She desires circumcision, but was deferred by OBGYN staff.    Objective:       Temp:  [98.1 °F (36.7 °C)-98.2 °F (36.8 °C)] 98.1 °F (36.7 °C)  Pulse:  [86-92] 86  Resp:  [18] 18  SpO2:  [98 %-100 %] 100 %  BP: (116-134)/(71-88) 134/88    General:   alert, appears stated age and cooperative   Lungs:   clear to auscultation bilaterally   Heart:   regular rate and rhythm, S1, S2 normal, no murmur, click, rub or gallop   Abdomen:  soft, non-tender; bowel sounds normal; no masses,  no organomegaly   Uterus:  firm located at the umblicus.    Incision: incision is clean, dry and intact; no erythema/induration   Extremities: peripheral pulses normal, no pedal edema, no clubbing or cyanosis     Lab Review  No results found for this or any previous visit (from the past 4 hour(s)).  I/O  No intake or output data in the 24 hours ending 17 0635   Assessment:     Patient Active Problem List    Diagnosis    Bleeding in early pregnancy    Family history of congenital heart defect    Family history of open neural tube defect    G6PD deficiency    History of pre-eclampsia in prior pregnancy, currently pregnant in third trimester    Previous  section complicating pregnancy    Gestational diabetes mellitus (GDM) in third trimester    Obesity affecting pregnancy in third trimester    Group B Streptococcus carrier, +RV culture, currently pregnant    Gestational diabetes mellitus (GDM) affecting third pregnancy    S/P repeat low transverse     Admission for sterilization     Plan:   1. Postpartum care:  - Patient doing well. Continue routine management and advances.  - Continue PO pain meds. Pain well controlled.  - Heme: H/h     Recent Labs  Lab 17  0525 17  0531   WBC 12.29 12.08   HGB 10.8* 9.4*   HCT 34.1* 29.9*   MCV 86 85    229     - Rh positive  - Encourage ambulation; patient reluctant to ambulate  - Circumcision desired, consents signed to chart  - Contraception: s/p BTL  - Lactation consult PRN    2. gDM  - post-partum fasting glucose 95    3. GBS  - s/p GBS ppx    4. Mild asymptomatic ABLA  - Fe, colace  - VSS  - continue prenatal vitamins in pp period    Dispo: As patient meets milestones, will plan to discharge today, POD #3.    Dianne Noble MD, PhD  OBGYN, PGY-1

## 2017-03-09 NOTE — LACTATION NOTE
03/09/17 0800   Maternal Infant Assessment   Breast Density filling   Nipple(s) everted   Infant Assessment   Sucking Reflex present   Rooting Reflex present   Swallow Reflex present   LATCH Score   Latch 2-->grasps breast, tongue down, lips flanged, rhythmic sucking   Audible Swallowing 2-->spontaneous and intermittent (24 hrs old)   Type Of Nipple 2-->everted (after stimulation)   Comfort (Breast/Nipple) 1-->filling, red/small blisters/bruises, mild/mod discomfort   Hold (Positioning) 1-->minimal assist, teach one side: mother does other, staff holds   Score (less than 7 for 2/more consecutive times, consult Lactation Consultant) 8   Breasts WDL   Breasts WDL WDL   Pain/Comfort Assessments   Pain Assessment Performed Yes   Pain Reassessment   Pain Rating Prior to Med Admin 3       Number Scale   Presence of Pain complains of pain/discomfort   Location abdomen   Pain Rating: Rest 3   Pain Rating: Activity 3   Pain Management Interventions quiet environment facilitated   Maternal Infant Feeding   Maternal Emotional State assist needed   Infant Positioning cross-cradle   Time Spent (min) 15-30 min   Latch Assistance yes   Breastfeeding History   Currently Breastfeeding yes   Feeding Infant   Effective Latch During Feeding yes   Audible Swallow yes   Suck/Swallow Coordination present   Lactation Referrals   Lactation Consult Breastfeeding assessment;Follow up   Lactation Interventions   Attachment Promotion breastfeeding assistance provided;counseling provided   Breastfeeding Assistance assisted with positioning;infant latch-on verified;infant suck/swallow verified;support offered;feeding cue recognition promoted   Maternal Breastfeeding Support lactation counseling provided   Latch Promotion positioning assisted;infant moved to breast   Lactation note: baby latches easily to breast. Swallows audible. Discharge breastfeeding education. Pt has lactation contact number.

## 2017-03-10 ENCOUNTER — TELEPHONE (OUTPATIENT)
Dept: OBSTETRICS AND GYNECOLOGY | Facility: CLINIC | Age: 32
End: 2017-03-10

## 2017-03-10 RX ORDER — LABETALOL 100 MG/1
100 TABLET, FILM COATED ORAL 2 TIMES DAILY
Qty: 60 TABLET | Refills: 11 | Status: ON HOLD | OUTPATIENT
Start: 2017-03-10 | End: 2017-03-16 | Stop reason: HOSPADM

## 2017-03-10 NOTE — TELEPHONE ENCOUNTER
----- Message from Lexis Alatorre sent at 3/10/2017 11:20 AM CST -----  Contact: Patient  x_ 1st Request  _ 2nd Request  _ 3rd Request    Who: PAOLO THOMAS [[id number]]    Why: Patient is calling in regards to scheduling a 1 week follow up appointment for her blood pressure. Patient states she has been monitoring her pressure and it's been high. Patient is need a call back from staff.    What Number to Call Back: Patient can be reached at [phone].    When to Expect a call back: (Before the end of the day)  -- if call after 3:00 call back will be tomorrow.

## 2017-03-10 NOTE — TELEPHONE ENCOUNTER
Scheduled 1 wk BP check appt for 3/13/2017 at 9:30 AM. Pt communicated understanding.    Pt says that she has been checking her BP and it has been hovering around 155/100. Spoke to Dr. Mosquera who said she would prescribe labatelol for pt to manage BP. Sent message to Dr. Mosquera regarding pt.    Informed pt that Dr. Mosquera will review her chart and treat accordingly and to look out for a call from her pharmacy telling her when her prescription is ready for pickup. Pt communicated understanding.

## 2017-03-10 NOTE — TELEPHONE ENCOUNTER
----- Message from Farhan Abbott LPN sent at 3/10/2017  1:25 PM CST -----  Contact: patient  Pt says her BP has been around 155/100 and desires a prescription to manage her hypertension. Please advise.

## 2017-03-10 NOTE — TELEPHONE ENCOUNTER
Patient reports elevated b/P, had pp pre eclampsia with both prior pregnancies.     Denies h/a blurred vision or abd pain.     Will begin Labetalol 100 bid, and recheck b/p in office Monday. Advised of precautions, and to come to ED for B/P > 160 systolic, or > 100 diastolic)

## 2017-03-12 ENCOUNTER — HOSPITAL ENCOUNTER (INPATIENT)
Facility: OTHER | Age: 32
LOS: 4 days | Discharge: HOME | DRG: 776 | End: 2017-03-16
Attending: OBSTETRICS & GYNECOLOGY | Admitting: OBSTETRICS & GYNECOLOGY
Payer: MEDICAID

## 2017-03-12 LAB
ALBUMIN SERPL BCP-MCNC: 2.5 G/DL
ALP SERPL-CCNC: 125 U/L
ALT SERPL W/O P-5'-P-CCNC: 26 U/L
ANION GAP SERPL CALC-SCNC: 6 MMOL/L
ANISOCYTOSIS BLD QL SMEAR: SLIGHT
AST SERPL-CCNC: 16 U/L
BASOPHILS # BLD AUTO: ABNORMAL K/UL
BASOPHILS NFR BLD: 1 %
BILIRUB SERPL-MCNC: 0.1 MG/DL
BUN SERPL-MCNC: 13 MG/DL
CALCIUM SERPL-MCNC: 8.7 MG/DL
CHLORIDE SERPL-SCNC: 111 MMOL/L
CO2 SERPL-SCNC: 25 MMOL/L
CREAT SERPL-MCNC: 0.6 MG/DL
CREAT UR-MCNC: 61.6 MG/DL
DIFFERENTIAL METHOD: ABNORMAL
EOSINOPHIL # BLD AUTO: ABNORMAL K/UL
EOSINOPHIL NFR BLD: 2 %
ERYTHROCYTE [DISTWIDTH] IN BLOOD BY AUTOMATED COUNT: 15.8 %
EST. GFR  (AFRICAN AMERICAN): >60 ML/MIN/1.73 M^2
EST. GFR  (NON AFRICAN AMERICAN): >60 ML/MIN/1.73 M^2
GLUCOSE SERPL-MCNC: 84 MG/DL
HCT VFR BLD AUTO: 31 %
HGB BLD-MCNC: 9.8 G/DL
HYPOCHROMIA BLD QL SMEAR: ABNORMAL
LYMPHOCYTES # BLD AUTO: ABNORMAL K/UL
LYMPHOCYTES NFR BLD: 23 %
MCH RBC QN AUTO: 27 PG
MCHC RBC AUTO-ENTMCNC: 31.6 %
MCV RBC AUTO: 85 FL
MONOCYTES # BLD AUTO: ABNORMAL K/UL
MONOCYTES NFR BLD: 7 %
NEUTROPHILS # BLD AUTO: ABNORMAL K/UL
NEUTROPHILS NFR BLD: 66 %
NEUTS BAND NFR BLD MANUAL: 1 %
PLATELET # BLD AUTO: 297 K/UL
PLATELET BLD QL SMEAR: ABNORMAL
PMV BLD AUTO: 10.3 FL
POLYCHROMASIA BLD QL SMEAR: ABNORMAL
POTASSIUM SERPL-SCNC: 4.1 MMOL/L
PROT SERPL-MCNC: 6.1 G/DL
PROT UR-MCNC: <7 MG/DL
PROT/CREAT RATIO, UR: NORMAL
RBC # BLD AUTO: 3.63 M/UL
SODIUM SERPL-SCNC: 142 MMOL/L
WBC # BLD AUTO: 9.7 K/UL

## 2017-03-12 PROCEDURE — 25000003 PHARM REV CODE 250: Performed by: STUDENT IN AN ORGANIZED HEALTH CARE EDUCATION/TRAINING PROGRAM

## 2017-03-12 PROCEDURE — 99285 EMERGENCY DEPT VISIT HI MDM: CPT | Mod: 25

## 2017-03-12 PROCEDURE — 96365 THER/PROPH/DIAG IV INF INIT: CPT

## 2017-03-12 PROCEDURE — 63600175 PHARM REV CODE 636 W HCPCS: Performed by: STUDENT IN AN ORGANIZED HEALTH CARE EDUCATION/TRAINING PROGRAM

## 2017-03-12 PROCEDURE — 82570 ASSAY OF URINE CREATININE: CPT

## 2017-03-12 PROCEDURE — 85007 BL SMEAR W/DIFF WBC COUNT: CPT

## 2017-03-12 PROCEDURE — 85027 COMPLETE CBC AUTOMATED: CPT

## 2017-03-12 PROCEDURE — 80053 COMPREHEN METABOLIC PANEL: CPT

## 2017-03-12 PROCEDURE — 96375 TX/PRO/DX INJ NEW DRUG ADDON: CPT

## 2017-03-12 PROCEDURE — 11000001 HC ACUTE MED/SURG PRIVATE ROOM

## 2017-03-12 PROCEDURE — 99283 EMERGENCY DEPT VISIT LOW MDM: CPT | Mod: ,,, | Performed by: OBSTETRICS & GYNECOLOGY

## 2017-03-12 PROCEDURE — 25000003 PHARM REV CODE 250: Performed by: OBSTETRICS & GYNECOLOGY

## 2017-03-12 RX ORDER — NAPROXEN 500 MG/1
500 TABLET ORAL EVERY 8 HOURS PRN
Status: DISCONTINUED | OUTPATIENT
Start: 2017-03-12 | End: 2017-03-16 | Stop reason: HOSPADM

## 2017-03-12 RX ORDER — HYDRALAZINE HYDROCHLORIDE 20 MG/ML
5 INJECTION INTRAMUSCULAR; INTRAVENOUS ONCE
Status: COMPLETED | OUTPATIENT
Start: 2017-03-12 | End: 2017-03-12

## 2017-03-12 RX ORDER — MAGNESIUM SULFATE HEPTAHYDRATE 40 MG/ML
2 INJECTION, SOLUTION INTRAVENOUS ONCE
Status: COMPLETED | OUTPATIENT
Start: 2017-03-12 | End: 2017-03-12

## 2017-03-12 RX ORDER — CALCIUM GLUCONATE 98 MG/ML
1 INJECTION, SOLUTION INTRAVENOUS
Status: DISCONTINUED | OUTPATIENT
Start: 2017-03-12 | End: 2017-03-15

## 2017-03-12 RX ORDER — MAGNESIUM SULFATE HEPTAHYDRATE 40 MG/ML
2 INJECTION, SOLUTION INTRAVENOUS CONTINUOUS
Status: DISCONTINUED | OUTPATIENT
Start: 2017-03-12 | End: 2017-03-15

## 2017-03-12 RX ORDER — LABETALOL 300 MG/1
300 TABLET, FILM COATED ORAL 3 TIMES DAILY
Status: DISCONTINUED | OUTPATIENT
Start: 2017-03-12 | End: 2017-03-14

## 2017-03-12 RX ORDER — ONDANSETRON 8 MG/1
8 TABLET, ORALLY DISINTEGRATING ORAL EVERY 8 HOURS PRN
Status: DISCONTINUED | OUTPATIENT
Start: 2017-03-12 | End: 2017-03-16 | Stop reason: HOSPADM

## 2017-03-12 RX ORDER — FUROSEMIDE 10 MG/ML
20 INJECTION INTRAMUSCULAR; INTRAVENOUS ONCE
Status: COMPLETED | OUTPATIENT
Start: 2017-03-12 | End: 2017-03-12

## 2017-03-12 RX ORDER — LABETALOL 200 MG/1
200 TABLET, FILM COATED ORAL 3 TIMES DAILY
Status: DISCONTINUED | OUTPATIENT
Start: 2017-03-12 | End: 2017-03-12

## 2017-03-12 RX ORDER — OXYCODONE AND ACETAMINOPHEN 5; 325 MG/1; MG/1
1 TABLET ORAL EVERY 4 HOURS PRN
Status: DISCONTINUED | OUTPATIENT
Start: 2017-03-12 | End: 2017-03-16 | Stop reason: HOSPADM

## 2017-03-12 RX ORDER — OXYCODONE HYDROCHLORIDE 5 MG/1
10 TABLET ORAL ONCE
Status: COMPLETED | OUTPATIENT
Start: 2017-03-12 | End: 2017-03-12

## 2017-03-12 RX ORDER — OXYCODONE AND ACETAMINOPHEN 10; 325 MG/1; MG/1
1 TABLET ORAL EVERY 4 HOURS PRN
Status: DISCONTINUED | OUTPATIENT
Start: 2017-03-12 | End: 2017-03-16 | Stop reason: HOSPADM

## 2017-03-12 RX ORDER — DIPHENHYDRAMINE HCL 25 MG
25 CAPSULE ORAL EVERY 4 HOURS PRN
Status: DISCONTINUED | OUTPATIENT
Start: 2017-03-12 | End: 2017-03-16 | Stop reason: HOSPADM

## 2017-03-12 RX ORDER — ADHESIVE BANDAGE
30 BANDAGE TOPICAL 2 TIMES DAILY PRN
Status: DISCONTINUED | OUTPATIENT
Start: 2017-03-13 | End: 2017-03-16 | Stop reason: HOSPADM

## 2017-03-12 RX ORDER — BISACODYL 10 MG
10 SUPPOSITORY, RECTAL RECTAL ONCE AS NEEDED
Status: ACTIVE | OUTPATIENT
Start: 2017-03-12 | End: 2017-03-12

## 2017-03-12 RX ORDER — BUTALBITAL, ACETAMINOPHEN AND CAFFEINE 50; 325; 40 MG/1; MG/1; MG/1
2 TABLET ORAL ONCE
Status: COMPLETED | OUTPATIENT
Start: 2017-03-12 | End: 2017-03-12

## 2017-03-12 RX ORDER — AMOXICILLIN 250 MG
1 CAPSULE ORAL NIGHTLY PRN
Status: DISCONTINUED | OUTPATIENT
Start: 2017-03-12 | End: 2017-03-16 | Stop reason: HOSPADM

## 2017-03-12 RX ORDER — HYDROCORTISONE 25 MG/G
CREAM TOPICAL 3 TIMES DAILY PRN
Status: DISCONTINUED | OUTPATIENT
Start: 2017-03-12 | End: 2017-03-16 | Stop reason: HOSPADM

## 2017-03-12 RX ORDER — DOCUSATE SODIUM 100 MG/1
200 CAPSULE, LIQUID FILLED ORAL 2 TIMES DAILY
Status: DISCONTINUED | OUTPATIENT
Start: 2017-03-12 | End: 2017-03-16 | Stop reason: HOSPADM

## 2017-03-12 RX ORDER — SODIUM CHLORIDE, SODIUM LACTATE, POTASSIUM CHLORIDE, CALCIUM CHLORIDE 600; 310; 30; 20 MG/100ML; MG/100ML; MG/100ML; MG/100ML
1000 INJECTION, SOLUTION INTRAVENOUS CONTINUOUS
Status: ACTIVE | OUTPATIENT
Start: 2017-03-12 | End: 2017-03-13

## 2017-03-12 RX ORDER — SIMETHICONE 80 MG
1 TABLET,CHEWABLE ORAL EVERY 6 HOURS PRN
Status: DISCONTINUED | OUTPATIENT
Start: 2017-03-12 | End: 2017-03-16 | Stop reason: HOSPADM

## 2017-03-12 RX ORDER — HYDRALAZINE HYDROCHLORIDE 20 MG/ML
10 INJECTION INTRAMUSCULAR; INTRAVENOUS ONCE
Status: COMPLETED | OUTPATIENT
Start: 2017-03-12 | End: 2017-03-12

## 2017-03-12 RX ADMIN — HYDRALAZINE HYDROCHLORIDE 10 MG: 20 INJECTION INTRAMUSCULAR; INTRAVENOUS at 09:03

## 2017-03-12 RX ADMIN — BUTALBITAL, ACETAMINOPHEN AND CAFFEINE 2 TABLET: 50; 325; 40 TABLET ORAL at 09:03

## 2017-03-12 RX ADMIN — HYDRALAZINE HYDROCHLORIDE 5 MG: 20 INJECTION INTRAMUSCULAR; INTRAVENOUS at 08:03

## 2017-03-12 RX ADMIN — LABETALOL HCL 300 MG: 300 TABLET, FILM COATED ORAL at 10:03

## 2017-03-12 RX ADMIN — OXYCODONE HYDROCHLORIDE AND ACETAMINOPHEN 1 TABLET: 10; 325 TABLET ORAL at 03:03

## 2017-03-12 RX ADMIN — LABETALOL HYDROCHLORIDE 200 MG: 200 TABLET, FILM COATED ORAL at 03:03

## 2017-03-12 RX ADMIN — FUROSEMIDE 20 MG: 10 INJECTION, SOLUTION INTRAMUSCULAR; INTRAVENOUS at 03:03

## 2017-03-12 RX ADMIN — MAGNESIUM SULFATE IN WATER 2 G: 40 INJECTION, SOLUTION INTRAVENOUS at 03:03

## 2017-03-12 RX ADMIN — SODIUM CHLORIDE, SODIUM LACTATE, POTASSIUM CHLORIDE, AND CALCIUM CHLORIDE 1000 ML: .6; .31; .03; .02 INJECTION, SOLUTION INTRAVENOUS at 03:03

## 2017-03-12 RX ADMIN — OXYCODONE HYDROCHLORIDE 10 MG: 5 TABLET ORAL at 10:03

## 2017-03-12 RX ADMIN — MAGNESIUM SULFATE IN WATER 2 G/HR: 40 INJECTION, SOLUTION INTRAVENOUS at 04:03

## 2017-03-12 RX ADMIN — HYDRALAZINE HYDROCHLORIDE 5 MG: 20 INJECTION INTRAMUSCULAR; INTRAVENOUS at 02:03

## 2017-03-12 NOTE — PROGRESS NOTES
Mother encouraged to provide breastmilk for her NICU baby. Benefits of breastmilk discussed with mother. Mother Mother would like to pump for her baby. Mother encouraged to provide  breastmilk by pumping.Benefits of breastmilk discussed. Breastpump initiated. Mother educated on pump use, cleaning pump parts, labeling containers and storage of breastmilk. Mother to call her nurse with further questions. Breast milk brought to fridge, baby coming tonight.

## 2017-03-12 NOTE — PROGRESS NOTES
"Magnesium Assessment Note    Subjective:         Zhanna Carlton is a 31 y.o. female POD#6 s/p RLTCS/BTL readmitted with postpartum preE w/ SF (BP), on IV MgSO4 for seizure prophylaxis.    Patient denies headaches, blurry vision and right upper quadrant pain. Denies CP, SOB. Patient reports no nausea/no vomiting. States her head just "doesn't feel right" but is not painful.    Objective:      Temp:  [97.9 °F (36.6 °C)-98.1 °F (36.7 °C)] 98.1 °F (36.7 °C)  Pulse:  [64-77] 73  Resp:  [16] 16  SpO2:  [95 %-99 %] 97 %  BP: (135-177)/(80-97) 143/81       I/O this shift:  In: 240.8 [I.V.:240.8]  Out: 2600 [Urine:2600]    General:   alert, appears stated age, cooperative and no distress   Lungs:   clear to auscultation bilaterally   Heart::   regular rate and rhythm, S1, S2 normal, no murmur, click, rub or gallop   Extremities: pedal edema 1 +   DTRs- 1+  bilaterally       Lab Review  Recent Results (from the past 24 hour(s))   Comprehensive metabolic panel    Collection Time: 03/12/17  2:06 PM   Result Value Ref Range    Sodium 142 136 - 145 mmol/L    Potassium 4.1 3.5 - 5.1 mmol/L    Chloride 111 (H) 95 - 110 mmol/L    CO2 25 23 - 29 mmol/L    Glucose 84 70 - 110 mg/dL    BUN, Bld 13 6 - 20 mg/dL    Creatinine 0.6 0.5 - 1.4 mg/dL    Calcium 8.7 8.7 - 10.5 mg/dL    Total Protein 6.1 6.0 - 8.4 g/dL    Albumin 2.5 (L) 3.5 - 5.2 g/dL    Total Bilirubin 0.1 0.1 - 1.0 mg/dL    Alkaline Phosphatase 125 55 - 135 U/L    AST 16 10 - 40 U/L    ALT 26 10 - 44 U/L    Anion Gap 6 (L) 8 - 16 mmol/L    eGFR if African American >60 >60 mL/min/1.73 m^2    eGFR if non African American >60 >60 mL/min/1.73 m^2   CBC auto differential    Collection Time: 03/12/17  2:06 PM   Result Value Ref Range    WBC 9.70 3.90 - 12.70 K/uL    RBC 3.63 (L) 4.00 - 5.40 M/uL    Hemoglobin 9.8 (L) 12.0 - 16.0 g/dL    Hematocrit 31.0 (L) 37.0 - 48.5 %    MCV 85 82 - 98 fL    MCH 27.0 27.0 - 31.0 pg    MCHC 31.6 (L) 32.0 - 36.0 %    RDW 15.8 (H) 11.5 - 14.5 % "    Platelets 297 150 - 350 K/uL    MPV 10.3 9.2 - 12.9 fL    Gran # CANCELED 1.8 - 7.7 K/uL    Lymph # CANCELED 1.0 - 4.8 K/uL    Mono # CANCELED 0.3 - 1.0 K/uL    Eos # CANCELED 0.0 - 0.5 K/uL    Baso # CANCELED 0.00 - 0.20 K/uL    Gran% 66.0 38.0 - 73.0 %    Lymph% 23.0 18.0 - 48.0 %    Mono% 7.0 4.0 - 15.0 %    Eosinophil% 2.0 0.0 - 8.0 %    Basophil% 1.0 0.0 - 1.9 %    Bands 1.0 %    Platelet Estimate Appears normal     Aniso Slight     Poly Occasional     Hypo Occasional     Differential Method Manual    Protein / creatinine ratio, urine    Collection Time: 03/12/17  2:58 PM   Result Value Ref Range    Protein, Urine Random <7 0 - 15 mg/dL    Creatinine, Random Ur 61.6 15.0 - 325.0 mg/dL    Prot/Creat Ratio, Ur Unable to calculate 0.00 - 0.20        Assessment:     32yo F POD#6 s/p RLTCS/BTL readmitted with postpartum preE w/ SF (BP), on IV MgSO4 for seizure prophylaxis.    Active Hospital Problems    Diagnosis  POA    *Preeclampsia in postpartum period [O14.95]  Yes      Resolved Hospital Problems    Diagnosis Date Resolved POA   No resolved problems to display.        Plan:     - Continue magnesium sulfate, no signs of toxicity at this time  - Close maternal monitoring including UOP and BP   BP: (135-177)/(80-97) 143/81   UOP >1L since admission  - Recheck in 2-4 hours or PRN  - As patient readmitted postpartum, will plan for full 24 hrs of magnesium    Lela Domínguez MD

## 2017-03-12 NOTE — PLAN OF CARE
Problem: Patient Care Overview  Goal: Plan of Care Review  Outcome: Ongoing (interventions implemented as appropriate)  VSS. Pt doing well, complains of mild incision pain. Denies HA, blurry vision, RUQ pain, or SOB. Output is adequate. Will continue to monitor.

## 2017-03-12 NOTE — ED PROVIDER NOTES
Encounter Date: 3/12/2017       History     Chief Complaint   Patient presents with    Headache    Hypertension     Review of patient's allergies indicates:  No Known Allergies  HPI Comments: Zhanna Carlton is a 31 y.o. [id number] PPD #6 s/p RLTCS/BTL presents complaining of elevated blood pressure and lower extremity swelling. Patient took her BP at home which resulted 150/110. She went to clinic for elevated BP on Friday, where she was prescribed labetalol 100 mg BID. She has been taking it as prescribed. She also complains of swelling in her ankles and shins that started on Friday. She denies HA, visual changes, and RUQ pain. She has been taking Percocet and ibuprofen for postoperative pain.    Her obstetric history is notable for postpartum preeclampsia in her two prior pregnancies.      Past Medical History:   Diagnosis Date    G6PD deficiency      Past Surgical History:   Procedure Laterality Date     SECTION      x2     Family History   Problem Relation Age of Onset    Breast cancer Paternal Grandmother     Ovarian cancer Paternal Grandmother     Cirrhosis Father     Colon cancer Neg Hx     Stroke Neg Hx     Hypertension Neg Hx     Diabetes Neg Hx      Social History   Substance Use Topics    Smoking status: Former Smoker    Smokeless tobacco: Never Used    Alcohol use No     Review of Systems   Eyes: Negative for visual disturbance.   Respiratory: Negative for chest tightness and shortness of breath.    Cardiovascular: Positive for leg swelling. Negative for chest pain.   Gastrointestinal: Negative for abdominal pain.   Genitourinary: Positive for vaginal bleeding. Negative for pelvic pain.   Neurological: Negative for headaches.   Musculoskeletal: + back pain at epidural site  Skin: negative for bruising other than epidural site; + breast feeding    Physical Exam   Temp:  [97.9 °F (36.6 °C)] 97.9 °F (36.6 °C)  Pulse:  [68-72] 71  Resp:  [16] 16  SpO2:  [96 %-99 %] 98 %  BP:  (148-177)/(83-97) 150/83     BP series: 177/97, 176/91, 162/91    Physical Exam    Constitutional: She appears well-developed and well-nourished. She is not diaphoretic. No distress.   HENT:   Head: Normocephalic and atraumatic.   Right Ear: External ear normal.   Cardiovascular: Normal rate and regular rhythm.   Pulmonary/Chest: Breath sounds normal. No respiratory distress. She has no wheezes. She has no rales.   Abdominal: Soft. She exhibits no distension. There is no tenderness. There is no rebound.   Incision clean/dry/intact   Musculoskeletal: Normal range of motion. She exhibits edema (bilateral 1+ ankle and tibial edema).   Neurological: She is alert and oriented to person, place, and time. She has normal strength and normal reflexes.   Skin: Skin is warm and dry.   Psychiatric: She has a normal mood and affect. Her behavior is normal. Judgment and thought content normal.         ED Course   Procedures  Labs Reviewed   COMPREHENSIVE METABOLIC PANEL - Abnormal; Notable for the following:        Result Value    Chloride 111 (*)     Albumin 2.5 (*)     Anion Gap 6 (*)     All other components within normal limits   CBC W/ AUTO DIFFERENTIAL - Abnormal; Notable for the following:     RBC 3.63 (*)     Hemoglobin 9.8 (*)     Hematocrit 31.0 (*)     MCHC 31.6 (*)     RDW 15.8 (*)     All other components within normal limits   PROTEIN / CREATININE RATIO, URINE             Medical Decision Making:   Initial Assessment:   31 y.o. [id number] POD #6 s/p RLTCS/BTL presents complaining of elevated blood pressure and lower extremity swelling.  Differential Diagnosis:   Postpartum Preeclampsia    ED Management:  Patient stable on initial exam. Complains of lower extremity swelling. Denies HA, vision changes, RUQ pain. Physical exam normal besides bilateral LE pitting edema. BP series: 177/97, 176/91, 162/91. Patient given 5 mg hydralazine IV. IV MgSO4 started for seizure prophylaxis.    CBC, CMP, P/c ratio collected. CBC and  CMP unremarkable. P/c pending at time of admission.    Will admit patient for observation and IV MgSO4 for postpartum preeclampsia.    Inder Weston MD  PGY-1 OB/GYN  [phone]    Discussed plan with Dr. Britton who was in agreement.                     ED Course     Clinical Impression:   The primary encounter diagnosis was Hypertension in pregnancy, preeclampsia, severe, delivered/postpartum. A diagnosis of Preeclampsia in postpartum period was also pertinent to this visit.      The patient has been seen and examined by me, and I agree with the Resident Dr Weston's assessment and plan as above with the following exceptions:ROS - patient did have a headache yesterday; Patient is having abdominal pain/incisional pain that requires 2 Percocet 5/325 mg every 4-6 hours.   PE - epidural site with moderate echymosis and mild tenderness to palpation; no erythema; no fluctuance or sx of infection. She has 3+ pitting edema at the ankles and feet - it improves as it goes up the leg     Shyla Britton MD  03/12/17 [id number]

## 2017-03-12 NOTE — H&P
History and Physical                                            Obstetrics          Subjective:      Zhanna Carlton is a 31 y.o. [id number] POD #6 s/p RLTCS/BTL presents complaining of elevated blood pressure and lower extremity swelling. Patient took her BP at home which resulted 150/110. She went to clinic for elevated BP on Friday, where she was prescribed labetalol 100 mg BID. She has been taking it as prescribed. She also complains of swelling in her ankles and shins that started on Friday. She denies HA, visual changes, and RUQ pain. She has been taking Percocet and ibuprofen for postoperative pain.    In the OB ED, BP series: 177/97, 176/91, 162/91. Patient was given 5 mg hydralazine IV and started on IV MgSO4 for seizure prophylaxis.     Her obstetric history is notable for postpartum preeclampsia in her two previous pregnancies.    PMHx:   Past Medical History:   Diagnosis Date    G6PD deficiency        PSHx:   Past Surgical History:   Procedure Laterality Date     SECTION      x2       All: Review of patient's allergies indicates:  No Known Allergies    Meds:   (Not in a hospital admission)    SH:   Social History     Social History    Marital status: Single     Spouse name: N/A    Number of children: N/A    Years of education: N/A     Occupational History    Not on file.     Social History Main Topics    Smoking status: Former Smoker    Smokeless tobacco: Never Used    Alcohol use No    Drug use: No    Sexual activity: Yes     Partners: Male     Birth control/ protection: None     Other Topics Concern    Not on file     Social History Narrative       FH:   Family History   Problem Relation Age of Onset    Breast cancer Paternal Grandmother     Ovarian cancer Paternal Grandmother     Cirrhosis Father     Colon cancer Neg Hx     Stroke Neg Hx     Hypertension Neg Hx     Diabetes Neg Hx        OBHx:   Obstetric History       T2      TAB0   SAB0   E0   M0   L3        # Outcome Date GA Lbr Michael/2nd Weight Sex Delivery Anes PTL Lv   3 Term 17 39w2d  3.74 kg (8 lb 3.9 oz) M CS-LTranv EPI N Y      Name: MARTHA,LINDSAY BOONE      Apgar1:  9                Apgar5: 9   2  08 36w5d  3.358 kg (7 lb 6.5 oz) M CS-LTranv Spinal  Y      Name: Amador   1 Term 05 39w5d  3.941 kg (8 lb 11 oz)  CS-LTranv   Y      Name: Hayden      Complications: Failed induction,Pre-eclampsia          Objective:       BP (!) 150/83  Pulse 71  Temp 97.9 °F (36.6 °C) (Temporal)   Resp 16  LMP 2016 (Exact Date)  SpO2 98%    Temp:  [97.9 °F (36.6 °C)] 97.9 °F (36.6 °C)  Pulse:  [68-72] 71  Resp:  [16] 16  SpO2:  [96 %-99 %] 98 %  BP: (148-177)/(83-97) 150/83    General:   alert, appears stated age and cooperative   Lungs:   clear to auscultation bilaterally   Heart:   regular rate and rhythm, S1, S2 normal, no murmur, click, rub or gallop   Abdomen:  soft, non-tender; bowel sounds normal; no masses,  no organomegaly   Extremities negative edema, negative erythema       Lab Review  Blood Type O POS  Recent Results (from the past 24 hour(s))   Comprehensive metabolic panel    Collection Time: 17  2:06 PM   Result Value Ref Range    Sodium 142 136 - 145 mmol/L    Potassium 4.1 3.5 - 5.1 mmol/L    Chloride 111 (H) 95 - 110 mmol/L    CO2 25 23 - 29 mmol/L    Glucose 84 70 - 110 mg/dL    BUN, Bld 13 6 - 20 mg/dL    Creatinine 0.6 0.5 - 1.4 mg/dL    Calcium 8.7 8.7 - 10.5 mg/dL    Total Protein 6.1 6.0 - 8.4 g/dL    Albumin 2.5 (L) 3.5 - 5.2 g/dL    Total Bilirubin 0.1 0.1 - 1.0 mg/dL    Alkaline Phosphatase 125 55 - 135 U/L    AST 16 10 - 40 U/L    ALT 26 10 - 44 U/L    Anion Gap 6 (L) 8 - 16 mmol/L    eGFR if African American >60 >60 mL/min/1.73 m^2    eGFR if non African American >60 >60 mL/min/1.73 m^2   CBC auto differential    Collection Time: 17  2:06 PM   Result Value Ref Range    WBC 9.70 3.90 - 12.70 K/uL    RBC 3.63 (L) 4.00 - 5.40 M/uL    Hemoglobin 9.8 (L)  12.0 - 16.0 g/dL    Hematocrit 31.0 (L) 37.0 - 48.5 %    MCV 85 82 - 98 fL    MCH 27.0 27.0 - 31.0 pg    MCHC 31.6 (L) 32.0 - 36.0 %    RDW 15.8 (H) 11.5 - 14.5 %    Platelets 297 150 - 350 K/uL    MPV 10.3 9.2 - 12.9 fL    Gran # CANCELED 1.8 - 7.7 K/uL    Lymph # CANCELED 1.0 - 4.8 K/uL    Mono # CANCELED 0.3 - 1.0 K/uL    Eos # CANCELED 0.0 - 0.5 K/uL    Baso # CANCELED 0.00 - 0.20 K/uL    Gran% 66.0 38.0 - 73.0 %    Lymph% 23.0 18.0 - 48.0 %    Mono% 7.0 4.0 - 15.0 %    Eosinophil% 2.0 0.0 - 8.0 %    Basophil% 1.0 0.0 - 1.9 %    Bands 1.0 %    Platelet Estimate Appears normal     Aniso Slight     Poly Occasional     Hypo Occasional     Differential Method Manual             Assessment:     31 y.o.  female POD #6 s/p RLTCS/BTL with history of postpartum preeclampsia admitted for Preeclampsia in postpartum period         Plan:          Preeclampsia in postpartum period  - Admit to postpartum unit  - BP mild range since hydralazine  - CBC, CMP unremarkable  - P/c ratio pending  - S/p hydralazine 5 mg IV. Patient prescribed labetalol 100 mg BID on Friday. Will increase to 200 mg TID PO.  - IV MgSO4 for seizure prophylaxis. Plan to continue for 24 hours.  - Naproxen and percocet for pain management  - Monitor BP and urine output        Inder Weston MD  PGY-1 OB/GYN  [phone]    Discussed plan with Dr. Hayes who was in agreement.

## 2017-03-12 NOTE — IP AVS SNAPSHOT
[facility] Location ([facility])  [address] [place], [place] [id number]  Phone: [phone]           Patient Discharge Instructions     Our goal is to set you up for success. This packet includes information on your condition, medications, and your home care. It will help you to care for yourself so you don't get sicker and need to go back to the hospital.     Please ask your nurse if you have any questions.        There are many details to remember when preparing to leave the hospital. Here is what you will need to do:    1. Take your medicine. If you are prescribed medications, review your Medication List in the following pages. You may have new medications to  at the pharmacy and others that you'll need to stop taking. Review the instructions for how and when to take your medications. Talk with your doctor or nurses if you are unsure of what to do.     2. Go to your follow-up appointments. Specific follow-up information is listed in the following pages. Your may be contacted by a transition nurse or clinical provider about future appointments. Be sure we have all of the phone numbers to reach you, if needed. Please contact your provider's office if you are unable to make an appointment.     3. Watch for warning signs. Your doctor or nurse will give you detailed warning signs to watch for and when to call for assistance. These instructions may also include educational information about your condition. If you experience any of warning signs to your health, call your doctor.               [facility]  Unless otherwise directed by your provider, please contact [facility], our nurse care line that is available for 24/7 assistance.     [phone] (toll-free)    Registered nurses in the [facility] provide clinical advisement, health education, appointment booking, and other advisory services.                    ** Verify the list of medication(s) below is accurate and up to  date. Carry this with you in case of emergency. If your medications have changed, please notify your healthcare provider.             Medication List      START taking these medications        Additional Info                      lisinopril 10 MG tablet   Quantity:  30 tablet   Refills:  11   Dose:  10 mg    Last time this was given:  10 mg on 3/16/2017  8:45 AM   Instructions:  Take 1 tablet (10 mg total) by mouth once daily.     Begin Date    AM    Noon    PM    Bedtime       nifedipine 30 MG ORAL TR24 30 MG (OSM) 24 hr tablet   Commonly known as:  PROCARDIA-XL   Quantity:  30 tablet   Refills:  11   Dose:  30 mg    Last time this was given:  90 mg on 3/15/2017  8:24 AM   Instructions:  Take 1 tablet (30 mg total) by mouth once daily.     Begin Date    AM    Noon    PM    Bedtime         CONTINUE taking these medications        Additional Info                      hydrocortisone 2.5 % rectal cream   Commonly known as:  ANUSOL-HC   Quantity:  30 g   Refills:  1    Instructions:  Apply rectally 2 times daily     Begin Date    AM    Noon    PM    Bedtime       ibuprofen 600 MG tablet   Commonly known as:  ADVIL,MOTRIN   Quantity:  30 tablet   Refills:  2   Dose:  600 mg    Instructions:  Take 1 tablet (600 mg total) by mouth 3 (three) times daily.     Begin Date    AM    Noon    PM    Bedtime       oxycodone 5 MG immediate release tablet   Commonly known as:  ROXICODONE   Quantity:  20 tablet   Refills:  0   Dose:  5 mg    Last time this was given:  10 mg on 3/12/2017 10:44 PM   Instructions:  Take 1 tablet (5 mg total) by mouth every 4 (four) hours as needed.     Begin Date    AM    Noon    PM    Bedtime       prenatal cmb#95-iron-FA-dha 28 mg iron-800 mcg-200 mg Cmpk   Refills:  0    Instructions:  Take by mouth.     Begin Date    AM    Noon    PM    Bedtime       VITAMIN C 100 MG tablet   Refills:  0   Dose:  100 mg   Generic drug:  ascorbic acid (vitamin C)    Instructions:  Take 100 mg by mouth once daily.      Begin Date    AM    Noon    PM    Bedtime         STOP taking these medications     labetalol 100 MG tablet   Commonly known as:  NORMODYNE            Where to Get Your Medications      These medications were sent to [facility] [place] - [place] [address] - [address]  [address] [address]     Phone:  [phone]     lisinopril 10 MG tablet    nifedipine 30 MG ORAL TR24 30 MG (OSM) 24 hr tablet                  Please bring to all follow up appointments:    1. A copy of your discharge instructions.  2. All medicines you are currently taking in their original bottles.  3. Identification and insurance card.    Please arrive 15 minutes ahead of scheduled appointment time.    Please call 24 hours in advance if you must reschedule your appointment and/or time.        Your Scheduled Appointments     Mar 23, 2017 11:30 AM CDT   Post OP with Merari Mosquera MD   [facility] ([facility]    [address] [place] [id number] [phone]            Apr 18, 2017  8:30 AM CDT   Post Partum with Merari Mosquera MD   [facility] ([facility]    [address] [place] [id number] [phone]              Follow-up Information     Schedule an appointment as soon as possible for a visit with Merari Mosquera MD.    Specialties:  Obstetrics, Obstetrics and Gynecology    Why:  Postpartum appointment/BP check    Contact information:    [address] [place] [address] [phone]          Follow up with Sparkle Langley MD. Call in 1 day.    Specialties:  Obstetrics, Obstetrics and Gynecology    Why:  BP follow up    Contact information:    [address] [place] [address] [phone]          Discharge Instructions     Future Orders    Activity as tolerated     Comments:    Nothing in the vagina for 6 weeks. Gradual, progressive resumption of regular daily activity. Patient may drive in two  weeks so long as she is not taking narcotics.    Call MD for:     Comments:    fever >100.4 F, SOB/lightheadedness, persistent nausea and vomiting, uncontrolled pain or vaginal bleeding saturating a heavy pad in an hour for two consecutive hours    Diet general     Questions:    Total calories:      Fat restriction, if any:      Protein restriction, if any:      Na restriction, if any:      Fluid restriction:      Additional restrictions:          Discharge Instructions       Preparation and Hygiene:  1. Shower daily.  2. Wear a clean bra each day and wash daily in warm soapy water.  3. Change wet or moist breast pads frequently.  Moist pads can promote growth of germs.  Actively wash your hands, paying close attention to the area around and under your fingernails, thoroughly with soap and water for 15 seconds before pumping or handling your milk.  Re-wash your hands if you touch anything (scratching your nose, answering the phone, etc) while pumping or handling your milk.   4. Before pumping your breasts, assemble the pump collection kit and have ready the sterile container and labels.  Place these items on a clean surface next to the breast pump.  5. Each time after you have finished pumping, take apart all of the parts of the breast pump collection kit and place them in a separate cleaning container (do not place them in the sink).  Be sure to remove the yellow valve from the breast shield and separate the white membrane from the yellow valve.  Rinse all of these parts with cool water.  Then use a new sponge and/or bottle brush and dishwashing detergent to clean the parts.  Rinse off the soapy water with cool water and air dry on a clean towel covered with a clean cloth.  All parts may also be washed after each use in the top rack of a .  6. Once each day, sanitize all of the parts of the breast pump collection kit.  This can be done by boiling the kit parts for 10 minutes or by using a Quick Clean  Micro-Steam Bag made by [employer]  7. If condensation appears in the tubing, continue to run the pump with the tubing attached for 1-2 minutes or until the tubing is dry.   8. Notify your babys nurse or doctor if you become ill or need to take any medication, even over-the-counter medicines.  Collection and Storage of Expressed Breast milk:         1. Pump your breasts at least 8 or more times every 24 hours.  Double pump (both breasts at the same time) for at least 15-20 minutes using the most suction that is comfortable.    2. Write the date and time of pumping and the name of any medications you are taking on the babys pre-printed hospital identification label.   3.    Do not touch the inside of the storage containers or lids.  4.        Tightly screw the lid onto the container and place immediately into the refrigerator for daily use.  5.    Expressed breast milk should be refrigerated or frozen within 4 hours of pumping.  6.        Do not store expressed breast milk on the door of your refrigerator or freezer             where the temperature is warmer.   7.        Refrigerated milk may be stored for up to 7 days.  At this point it can be moved to the freezer for 6 -12 months.  8.        Thaw frozen breast milk in overnight in the refrigerator.  Once milk is thawed it must be used within 24 hours.  9.        Refrigerated breast milk needs to be warmed to room temperature.  Warm by leaving unrefrigerated until it reached room temperature, or place sealed bottle into a cup of warm (not boiling) water or use a bottle warmer.               Never warm breast milk in a microwave or boiling water.    For any questions or concerns call the Lactation Department at [phone]    Breastfeeding Discharge Instructions       Feed the baby at the earliest sign of hunger or comfort  o Hands to mouth, sucking motions  o Rooting or searching for something to suck on  o Dont wait for crying - it is a sign of distress      The feedings may be 8-12 times per 24hrs and will not follow a schedule   Avoid pacifiers and bottles for the first 4 weeks   Alternate the breast you start the feeding with, or start with the breast that feels the fullest   Switch breasts when the baby takes himself off the breast or falls asleep   Keep offering breasts until the baby looks full, no longer gives hunger signs, and stays asleep when placed on his back in the crib   If the baby is sleepy and wont wake for a feeding, put the baby skin-to-skin dressed in a diaper against the mothers bare chest   Sleep near your baby   The baby should be positioned and latched on to the breast correctly  o Chest-to-chest, chin in the breast  o Babys lips are flipped outward  o Babys mouth is stretched open wide like a shout  o Babys sucking should feel like tugging to the mother  - The baby should be drinking at the breast:  o You should hear swallowing or gulping throughout the feeding  o You should see milk on the babys lips when he comes off the breast  o Your breasts should be softer when the baby is finished feeding  o The baby should look relaxed at the end of feedings  o After the 4th day and your milk is in:  o The babys poop should turn bright yellow and be loose, watery, and seedy  o The baby should have at least 3-4 poops the size of the palm of your hand per day  o The baby should have at least 5-6 wet diapers per day  o The urine should be light yellow in color  You should drink when you are thirsty and eat a healthy diet when you are    hungry.     Take naps to get the rest you need.   Take medications and/or drink alcohol only with permission of your obstetrician    or the babys pediatrician.  You can also call the Infant Risk Center,   ([phone]), Monday-Friday, 8am-5pm Central time, to get the most   up-to-date evidence-based information on the use of medications during   pregnancy and breastfeeding.      The baby should be  "examined by a pediatrician at 3-5 days of age.  Once your   milk comes in, the baby should be gaining at least ½ - 1oz each day and should be back to birthweight no later than 10-14 days of age.          Community Resources    [facility] Breastfeeding Warmline: [phone]   Local [facility] clinics: provide incentives and breastpumps to eligible mothers  La Leche League International (LLLI):  mother-to-mother support group website        www.lll.[employer]  Local La Leche Lemarion mother-to-mother support groups:        www.[employer]        La Leche League [facility]   Dr. Aston Brenner website for latch videos and general information:        www.breastfeedinginc.ca  Infant Risk Center is a call center that provides information about the safety of taking medications while breastfeeding.  Call [phone], M-F, 8am-5pm, CT.  International Lactation Consultant Association provides resources for assistance:        www.ilca.org  [facility] Breastfeeding Coalition provides informationand resources for parents  and the community    http://[facility].org     Citlaly Perales is a mom-to-mom support group:                             www.nolanesting.ActionRun//breastfeedng-support/  Partners for Healthy Babies:  6-537-563-BABY(3517)  Cafe [employer] Lait: a breastfeeding support group for women of color, 817.399.3281        Primary Diagnosis     Your primary diagnosis was:  High Blood Pressure And Swelling During Pregnancy      Admission Information     Date & Time Provider Department [facility]    3/12/2017  1:33 PM Merari Mosquera MD [facility] [phone]      Care Providers     Provider Role Specialty Primary office phone    Merari Mosquera MD Attending Provider Obstetrics [phone]      Your Vitals Were     BP Pulse Temp Resp Height Weight    144/85 (BP Location: Right arm, BP Method: Automatic) 69 97.6 °F (36.4 °C) (Oral) 18 5' 5" (1.651 m) 106 kg (233 lb 9.6 oz)    Last Period SpO2 BMI    "       06/04/2016 (Exact Date) 99% 38.87 kg/m2        Recent Lab Values     No lab values to display.      Allergies as of 3/16/2017     No Known Allergies      Advance Directives     An advance directive is a document which, in the event you are no longer able to make decisions for yourself, tells your healthcare team what kind of treatment you do or do not want to receive, or who you would like to make those decisions for you.  If you do not currently have an advance directive, [facility] encourages you to create one.  For more information call:  [phone]), [phone] ([phone]),  or log on to [email or web address].        Language Assistance Services     ATTENTION: Language assistance services are available, free of charge. Please call [phone].      ATENCIÓN: Si habla español, tiene a atkinson disposición servicios gratuitos de asistencia lingüística. Llame al [phone].     CHÚ Ý: N?u b?n nói Ti?ng Vi?t, có các d?ch v? h? tr? ngôn ng? mi?n phí dành cho b?n. G?i s? [phone].        Diabetes Discharge Instructions                                    [facility] complies with applicable Federal civil rights laws and does not discriminate on the basis of race, color, national origin, age, disability, or sex.

## 2017-03-13 PROCEDURE — 25000003 PHARM REV CODE 250: Performed by: OBSTETRICS & GYNECOLOGY

## 2017-03-13 PROCEDURE — 11000001 HC ACUTE MED/SURG PRIVATE ROOM

## 2017-03-13 PROCEDURE — 25000003 PHARM REV CODE 250: Performed by: STUDENT IN AN ORGANIZED HEALTH CARE EDUCATION/TRAINING PROGRAM

## 2017-03-13 RX ORDER — HYDROCHLOROTHIAZIDE 25 MG/1
25 TABLET ORAL DAILY
Status: DISCONTINUED | OUTPATIENT
Start: 2017-03-13 | End: 2017-03-15

## 2017-03-13 RX ADMIN — OXYCODONE HYDROCHLORIDE AND ACETAMINOPHEN 1 TABLET: 10; 325 TABLET ORAL at 10:03

## 2017-03-13 RX ADMIN — HYDROCHLOROTHIAZIDE 25 MG: 25 TABLET ORAL at 11:03

## 2017-03-13 RX ADMIN — NAPROXEN 500 MG: 500 TABLET ORAL at 08:03

## 2017-03-13 RX ADMIN — LABETALOL HCL 300 MG: 300 TABLET, FILM COATED ORAL at 09:03

## 2017-03-13 RX ADMIN — OXYCODONE HYDROCHLORIDE AND ACETAMINOPHEN 1 TABLET: 5; 325 TABLET ORAL at 08:03

## 2017-03-13 RX ADMIN — OXYCODONE HYDROCHLORIDE AND ACETAMINOPHEN 1 TABLET: 10; 325 TABLET ORAL at 02:03

## 2017-03-13 RX ADMIN — OXYCODONE HYDROCHLORIDE AND ACETAMINOPHEN 1 TABLET: 10; 325 TABLET ORAL at 06:03

## 2017-03-13 RX ADMIN — LABETALOL HCL 300 MG: 300 TABLET, FILM COATED ORAL at 06:03

## 2017-03-13 RX ADMIN — DOCUSATE SODIUM 200 MG: 100 CAPSULE, LIQUID FILLED ORAL at 02:03

## 2017-03-13 RX ADMIN — LABETALOL HCL 300 MG: 300 TABLET, FILM COATED ORAL at 01:03

## 2017-03-13 RX ADMIN — DOCUSATE SODIUM 200 MG: 100 CAPSULE, LIQUID FILLED ORAL at 09:03

## 2017-03-13 NOTE — PROGRESS NOTES
Pt drank half pitcher of water then attempted to eat breakfast, vomitted 150 ml of water, feels better after emesis, instructed to separate eating from drinking while on mag and drink with frequency small amounts at a time, verbalized understanding, pt does not want nausea medicine.

## 2017-03-13 NOTE — LACTATION NOTE
03/13/17 1800   Maternal Infant Assessment   Breast Density Bilateral:;full   Breasts WDL   Breasts WDL WDL       Number Scale   Presence of Pain denies   Location nipple(s)   Pain Rating: Rest 0   Pain Rating: Activity 1   Maternal Infant Feeding   Maternal Emotional State independent;relaxed   Time Spent (min) 15-30 min   Breastfeeding Education adequate infant intake;adequate milk volume;increasing milk supply;label/storage of breast milk;milk expression, electric pump   Breastfeeding History   Currently Breastfeeding yes   Equipment Type/Education   Pump Type Symphony   Breast Pump Type double electric, hospital grade   Breast Pump Flange Type hard   Breast Pumping Bilateral Breasts:;pumped until emptied   Pumping Frequency (times) (8-10 times per day)   Lactation Referrals   Lactation Consult Follow up   Lactation Interventions   Breastfeeding Assistance feeding cue recognition promoted;feeding on demand promoted;milk expression/pumping   Maternal Breastfeeding Support diary/feeding log utilized;encouragement offered;infant-mother separation minimized;lactation counseling provided;maternal hydration promoted;maternal nutrition promoted;maternal rest encouraged   lactation rounds. Pt readmitted for increased Bp. Pt pumping . Nursing baby when baby is with her and family is bottle feeding infant at other times. Giving EBM. Encouraged pt to empty the breast 8-10 times per 24 hours. Pt states that she has not been pumping this often secondary to illness. Encouraged pt to increase her pumping now that she is feeling better. Pump for extra stimulation after the baby nurses to increase milk supply. Pump use and care reviewed. Pt to call lc as needed.

## 2017-03-13 NOTE — PROGRESS NOTES
Dr. Tello notified of pt complaining of pain in head, incision, and back and BP recheck after hydralazine 10 mg, RN recently administered Fioricet with acetaminophen. MD to place orders for oxycodone one time dose and increase scheduled labetalol from 200 mg to 300 mg. Pt safety maintained. Will continue to monitor.

## 2017-03-13 NOTE — MEDICAL/APP STUDENT
Delivery Discharge Summary  Obstetrics      Primary OB Clinician: [unfilled]***    Admission date: 3/12/2017  Discharge date: 2017    Disposition: To home, self care    Admit Dx:      Patient Active Problem List   Diagnosis    Bleeding in early pregnancy    Family history of congenital heart defect    Family history of open neural tube defect    G6PD deficiency    History of pre-eclampsia in prior pregnancy, currently pregnant in third trimester    Previous  section complicating pregnancy    Gestational diabetes mellitus (GDM) in third trimester    Obesity affecting pregnancy in third trimester    Group B Streptococcus carrier, +RV culture, currently pregnant    Gestational diabetes mellitus (GDM) affecting third pregnancy    S/P repeat low transverse     Admission for sterilization    Preeclampsia in postpartum period     Discharge Dx:    Patient Active Problem List   Diagnosis    Bleeding in early pregnancy    Family history of congenital heart defect    Family history of open neural tube defect    G6PD deficiency    History of pre-eclampsia in prior pregnancy, currently pregnant in third trimester    Previous  section complicating pregnancy    Gestational diabetes mellitus (GDM) in third trimester    Obesity affecting pregnancy in third trimester    Group B Streptococcus carrier, +RV culture, currently pregnant    Gestational diabetes mellitus (GDM) affecting third pregnancy    S/P repeat low transverse     Admission for sterilization    Preeclampsia in postpartum period       Procedure: , due to previous LTCS    Hospital Course:  Zhanna Carlton is a 31 y.o. now , Post-Op D #*** who was admitted on 3/12/2017 at *** for elevated blood pressure and lower extremity swelling. Pregnancy complicated by gestational diabetes and leaking of fluid in  semester which is now resolved. On initial assessment, vital signs were stable and  physical exam was normal. Infant was in cephalic presentation. Patient was subsequently admitted to labor and delivery unit with signed consents. Labor course was managed with epidural per anesthesia.  Patient delivered a single viable  male. Please see delivery note for further details. Pt was in ***stable condition post delivery and was transferred to the Mother-Baby Unit. Her postpartum course was complicated by failed induction and pre-eclampsia. On discharge day, patient's pain is controlled with oral pain medications. Pt is tolerating ambulation without SOB or CP, and PO diet without N/V. Reports lochia is ***mild. Denies any HA, vision changes, F/C, LE swelling. Denies any breast pain/soreness.  Pt in stable condition and ready for discharge. She has been instructed to continue ***as indicated as well as pain medications as needed and to follow up in the OB clinic in ***6 weeks with her obstetrics provider.    Pertinent studies:  Postpartum CBC  Lab Results   Component Value Date    WBC 9.70 2017    HGB 9.8 (L) 2017    HCT 31.0 (L) 2017    MCV 85 2017     2017     ***    Tubal Ligation: done with c/section  Feeding Method: {Source; infant milk:40796}  Rh Immune Globulin Given(O POS): N/A***  Rubella Vaccine Given: N/A***  Tdap Vaccine Given: N/A***    Delivery:    Episiotomy:     Lacerations:     Repair suture:     Repair # of packets: 10   Blood loss (ml): 0     Birth information:  YOB: 2019   Time of birth: 7:18 AM   Sex: male   Delivery type: , Low Transverse   Gestational Age: 39w2d    Delivery Clinician:      Other providers:       Additional  information:  Forceps:    Vacuum:    Breech:    Observed anomalies      Living?:           APGARS  One minute Five minutes Ten minutes   Skin color:         Heart rate:         Grimace:         Muscle tone:         Breathing:         Totals: 9  9        Placenta: Delivered:        appearance      Patient Instructions:   Current Discharge Medication List      CONTINUE these medications which have NOT CHANGED    Details   ibuprofen (ADVIL,MOTRIN) 600 MG tablet Take 1 tablet (600 mg total) by mouth 3 (three) times daily.  Qty: 30 tablet, Refills: 2    Associated Diagnoses: Obesity affecting pregnancy in third trimester; S/P repeat low transverse ; Previous  section complicating pregnancy; Diet controlled gestational diabetes mellitus (GDM) in third trimester; History of pre-eclampsia in prior pregnancy, currently pregnant in third trimester      labetalol (NORMODYNE) 100 MG tablet Take 1 tablet (100 mg total) by mouth 2 (two) times daily.  Qty: 60 tablet, Refills: 11    Associated Diagnoses: Postpartum hypertension      oxycodone (ROXICODONE) 5 MG immediate release tablet Take 1 tablet (5 mg total) by mouth every 4 (four) hours as needed.  Qty: 20 tablet, Refills: 0    Associated Diagnoses: Obesity affecting pregnancy in third trimester; S/P repeat low transverse ; Previous  section complicating pregnancy; Diet controlled gestational diabetes mellitus (GDM) in third trimester; History of pre-eclampsia in prior pregnancy, currently pregnant in third trimester      ascorbic acid, vitamin C, (VITAMIN C) 100 MG tablet Take 100 mg by mouth once daily.      hydrocortisone (ANUSOL-HC) 2.5 % rectal cream Apply rectally 2 times daily  Qty: 30 g, Refills: 1    Associated Diagnoses: Hemorrhoids during pregnancy in third trimester      prenatal cmb#95-iron-FA-dha 28 mg iron-800 mcg-200 mg Cmpk Take by mouth.             No discharge procedures on file.    ***

## 2017-03-13 NOTE — PROGRESS NOTES
Fall risk assessment completed, pt on fall precautions, yellow socks on feet, not to get up out of bed without assistance, pt on magnesium infusion, yellow fall arm band in place.

## 2017-03-13 NOTE — PLAN OF CARE
Problem: Patient Care Overview  Goal: Plan of Care Review  Outcome: Ongoing (interventions implemented as appropriate)  Reviewed plan for magnesium infusion for 24 hours, reviewed fall precautions, plan for shower, voiding s/p henderson removal, bonding with  when comes to visit from home, BP checks, instructed to notify nurse for headaches, visual changes, epigastric pain, verbalized understanding.

## 2017-03-13 NOTE — PROGRESS NOTES
Informed by OB team that pt was experiencing headaches since RLTCS w/BTL, for which pt received a CSE for. Pt is currently POD#7 and remains admitted for pre-e with SF treatment (off of Mg today).     Upon my evaluation pt states that she began to experience headache 2 or 3 days after procedure. Describes it as an intermittent, pressure-like sensation that is confined to the frontal area but sometimes migrates to occipital area. Associated with lower back pain; denies photophobia/phonophobia, tinnitus, vision changes, mentation changes or changes to strength/sensation throughout. Worse with movement from supine position and pt believes that it is much worse with elevated BP. Improves with Percocet and Fioricet.   Does have a hx of migraines but doesn't think this is similar at all, especially since she lacks phonophobia/photophobia. States that this is something different, and is more similar to a HA that comes with allergies/congestion. She does note slight improvement over the past couple of days and thinks that this could be related to improved blood pressures.    Objectively pt was AAOx4 and in minimal distress from HA/back pain on exam. No gross focal neurologic deficits. RRR. Unlabored breathing on RA. Small bruise noted to site of CSE but no TTP to area nor am I able to appreciate any induration/fluctuance. Afebrile and noted to have an improvement in BP with last two recordings.    Anesthetic procedure note reviewed for CSE - no complications reported.    Differential for HA does include spinal headache. Pencil-tip needle used for spinal portion of procedure, making spinal HA uncommon, but still possible. Discussed with pt that if this is in fact a spinal HA then it should resolve 7-10 days from procedure (currently POD#7). Supportive treatment includes caffeine, continue with drinking coffee +/- Fioricet per primary. Discussed with pt that if headache continues despite supportive treatment then the next  step for managing a potential spinal headache is to perform a blood patch. Details surrounding procedure were discussed in full, including risks/benefits. Pt does not think she would want such procedure if that were the case.    Will continue to monitor pt's status while admitted.     Case discussed with staff, Dr. Rocha.

## 2017-03-13 NOTE — PROGRESS NOTES
Dr. Domínguez notified of pt BP rechecked and complains of increased headache 6/10 throbbing to the right eye. MD states will place orders to give  2 fioricet and 10 mg hydralizine. Will continue to monitor.

## 2017-03-13 NOTE — PROGRESS NOTES
Dr. Domínguez notified that pt complaining of tingling headache in back of head, pt is shaky,  and has an increased manual pressure of 174/100. Pt denies blurred vision and right upper quadrant pain.pt rates headache pain 2/10. Instructed to give 5mg of hydralazine iv and to recheck pt's pressure in 30 mins. Will continue to monitor pt.

## 2017-03-13 NOTE — PLAN OF CARE
"Problem: Patient Care Overview  Goal: Plan of Care Review  Outcome: Ongoing (interventions implemented as appropriate)  Pt's henderson to gravity, urine output WNL, TEDS and SCDs in use. Pt has complained "aggravating" headache, pt declines SOB, RUQ pain, dizziness, nausea. Suction equipment at bedside, pt on continuous mag and LR infusions. Pt also receiving labetalol TID. BP increased intermittently during shift, MD notified- BP treated with meds. Fall risk protocol maintained. Family at bedside. Pt safety maintained. Will continue monitor.       "

## 2017-03-13 NOTE — PROGRESS NOTES
Up to BRP with assist, voided small amount of urine s/p henderson removal, in shower seat for shower,  at side to assist, pt denies dizziness or lightheadedness, ambulated well to BRP.

## 2017-03-13 NOTE — PROGRESS NOTES
"Dr. Domínguez notified that pt still complaining of lingering headache upon rounding. Pain 2/10. Pt states "the headache is better than before, but it's just annoying." pt given percocet prior to call. Will continue to monitor pt.  "

## 2017-03-13 NOTE — PROGRESS NOTES
Magnesium Assessment Note    Subjective:         Zhanna Carlton is a 31 y.o. female POD#7 s/p RLTCS/BTL readmitted with postpartum preE w/ SF (BP), on IV MgSO4 for seizure prophylaxis.    Patient reports mild HA 2/10, improved with pain medication. Denies blurry vision and right upper quadrant pain. Denies CP, SOB. Patient reports no nausea/no vomiting.      Objective:      Temp:  [97.9 °F (36.6 °C)-98.3 °F (36.8 °C)] 98 °F (36.7 °C)  Pulse:  [64-95] 69  Resp:  [12-20] 12  SpO2:  [93 %-99 %] 94 %  BP: (135-177)/() 155/88    I/O last 3 completed shifts:  In: 840.8 [P.O.:500; I.V.:340.8]  Out: 3200 [Urine:3200]  I/O this shift:  In: 900 [I.V.:900]  Out: 2300 [Urine:2300]    General:   alert, appears stated age, cooperative and no distress   Lungs:   clear to auscultation bilaterally   Heart::   regular rate and rhythm, S1, S2 normal, no murmur, click, rub or gallop   Extremities: pedal edema 1 +   DTRs- 2+  bilaterally       Lab Review  Recent Results (from the past 24 hour(s))   Comprehensive metabolic panel    Collection Time: 03/12/17  2:06 PM   Result Value Ref Range    Sodium 142 136 - 145 mmol/L    Potassium 4.1 3.5 - 5.1 mmol/L    Chloride 111 (H) 95 - 110 mmol/L    CO2 25 23 - 29 mmol/L    Glucose 84 70 - 110 mg/dL    BUN, Bld 13 6 - 20 mg/dL    Creatinine 0.6 0.5 - 1.4 mg/dL    Calcium 8.7 8.7 - 10.5 mg/dL    Total Protein 6.1 6.0 - 8.4 g/dL    Albumin 2.5 (L) 3.5 - 5.2 g/dL    Total Bilirubin 0.1 0.1 - 1.0 mg/dL    Alkaline Phosphatase 125 55 - 135 U/L    AST 16 10 - 40 U/L    ALT 26 10 - 44 U/L    Anion Gap 6 (L) 8 - 16 mmol/L    eGFR if African American >60 >60 mL/min/1.73 m^2    eGFR if non African American >60 >60 mL/min/1.73 m^2   CBC auto differential    Collection Time: 03/12/17  2:06 PM   Result Value Ref Range    WBC 9.70 3.90 - 12.70 K/uL    RBC 3.63 (L) 4.00 - 5.40 M/uL    Hemoglobin 9.8 (L) 12.0 - 16.0 g/dL    Hematocrit 31.0 (L) 37.0 - 48.5 %    MCV 85 82 - 98 fL    MCH 27.0 27.0 -  31.0 pg    MCHC 31.6 (L) 32.0 - 36.0 %    RDW 15.8 (H) 11.5 - 14.5 %    Platelets 297 150 - 350 K/uL    MPV 10.3 9.2 - 12.9 fL    Gran # CANCELED 1.8 - 7.7 K/uL    Lymph # CANCELED 1.0 - 4.8 K/uL    Mono # CANCELED 0.3 - 1.0 K/uL    Eos # CANCELED 0.0 - 0.5 K/uL    Baso # CANCELED 0.00 - 0.20 K/uL    Gran% 66.0 38.0 - 73.0 %    Lymph% 23.0 18.0 - 48.0 %    Mono% 7.0 4.0 - 15.0 %    Eosinophil% 2.0 0.0 - 8.0 %    Basophil% 1.0 0.0 - 1.9 %    Bands 1.0 %    Platelet Estimate Appears normal     Aniso Slight     Poly Occasional     Hypo Occasional     Differential Method Manual    Protein / creatinine ratio, urine    Collection Time: 03/12/17  2:58 PM   Result Value Ref Range    Protein, Urine Random <7 0 - 15 mg/dL    Creatinine, Random Ur 61.6 15.0 - 325.0 mg/dL    Prot/Creat Ratio, Ur Unable to calculate 0.00 - 0.20        Assessment:     30yo F POD#7 s/p RLTCS/BTL readmitted with postpartum preE w/ SF (BP), on IV MgSO4 for seizure prophylaxis.    Active Hospital Problems    Diagnosis  POA    *Preeclampsia in postpartum period [O14.95]  Yes      Resolved Hospital Problems    Diagnosis Date Resolved POA   No resolved problems to display.        Plan:     - Continue magnesium sulfate, no signs of toxicity at this time  - Close maternal monitoring including UOP and BP   - BP: (135-177)/() 154/89   - -340 ml/hr   - Recheck in 4 hours or PRN  - As patient readmitted postpartum, will plan for full 24 hrs of magnesium    Lela Domínguez MD

## 2017-03-13 NOTE — PROGRESS NOTES
[place] came for visit with mother,  currently breastfeeding to left breast, pt smiling and happy.

## 2017-03-13 NOTE — PROGRESS NOTES
Magnesium Assessment Note    Subjective:         Zhanna Carlton is a 31 y.o. female POD#7 s/p RLTCS w/BTL who is here for management of postpartum preeclampsia with severe features (BP) on IV MgSO4 for seizure prophylaxis.    Patient reports headaches, denies blurry vision and denies right upper quadrant pain. Denies CP, denies SOB. Patient reports no nausea/no vomiting.     Objective:      Temp:  [98 °F (36.7 °C)-98.3 °F (36.8 °C)] 98.2 °F (36.8 °C)  Pulse:  [63-95] 71  Resp:  [12-20] 20  SpO2:  [92 %-99 %] 95 %  BP: (132-176)/() 132/75    I/O last 3 completed shifts:  In: 2040.8 [P.O.:500; I.V.:1540.8]  Out: 6110 [Urine:6110]  I/O this shift:  In: 1200 [P.O.:500; I.V.:700]  Out: 1080 [Urine:930; Emesis/NG output:150]    General:   alert, appears stated age and cooperative   Lungs:   clear to auscultation bilaterally   Heart::   regular rate and rhythm, S1, S2 normal, no murmur, click, rub or gallop   Extremities: no pedal edema noted   DTRs- 1+  left brachioradialis       Lab Review  Recent Results (from the past 24 hour(s))   Protein / creatinine ratio, urine    Collection Time: 17  2:58 PM   Result Value Ref Range    Protein, Urine Random <7 0 - 15 mg/dL    Creatinine, Random Ur 61.6 15.0 - 325.0 mg/dL    Prot/Creat Ratio, Ur Unable to calculate 0.00 - 0.20        Assessment:     31 y.o.  female at 39w2d, on IV magnesium sulfate.    Active Hospital Problems    Diagnosis  POA    *Preeclampsia in postpartum period [O14.95]  Yes      Resolved Hospital Problems    Diagnosis Date Resolved POA   No resolved problems to display.        Plan:     - On magnesium sulfate, no signs of toxicity at this salv[place]  - Close maternal monitoring including UOP and BP  - UOP >150cc/hr  - BP: (132-176)/() 132/75  - Okay to stop magnesium at this time  - Patient reports headache, is due for pain medicine, will reassess if unable to alleviate after scheduled pain medication    Flor Britton MD

## 2017-03-13 NOTE — PROGRESS NOTES
Magnesium Assessment Note    Subjective:         Zhanna Carlton is a 31 y.o. female POD#6 s/p RLTCS/BTL readmitted with postpartum preE w/ SF (BP), on IV MgSO4 for seizure prophylaxis.    Patient denies headaches, blurry vision and right upper quadrant pain. Denies CP, SOB. Patient reports no nausea/no vomiting. HA is improved w/ Fioricet.     Objective:      Temp:  [97.9 °F (36.6 °C)-98.3 °F (36.8 °C)] 98.3 °F (36.8 °C)  Pulse:  [64-95] 91  Resp:  [16-20] 16  SpO2:  [94 %-99 %] 96 %  BP: (135-177)/() 153/79    I/O last 3 completed shifts:  In: 840.8 [P.O.:500; I.V.:340.8]  Out: 3200 [Urine:3200]  I/O this shift:  In: 400 [I.V.:400]  Out: 1135 [Urine:1135]    General:   alert, appears stated age, cooperative and no distress   Lungs:   clear to auscultation bilaterally   Heart::   regular rate and rhythm, S1, S2 normal, no murmur, click, rub or gallop   Extremities: pedal edema 1 +   DTRs- 2+  bilaterally       Lab Review  Recent Results (from the past 24 hour(s))   Comprehensive metabolic panel    Collection Time: 03/12/17  2:06 PM   Result Value Ref Range    Sodium 142 136 - 145 mmol/L    Potassium 4.1 3.5 - 5.1 mmol/L    Chloride 111 (H) 95 - 110 mmol/L    CO2 25 23 - 29 mmol/L    Glucose 84 70 - 110 mg/dL    BUN, Bld 13 6 - 20 mg/dL    Creatinine 0.6 0.5 - 1.4 mg/dL    Calcium 8.7 8.7 - 10.5 mg/dL    Total Protein 6.1 6.0 - 8.4 g/dL    Albumin 2.5 (L) 3.5 - 5.2 g/dL    Total Bilirubin 0.1 0.1 - 1.0 mg/dL    Alkaline Phosphatase 125 55 - 135 U/L    AST 16 10 - 40 U/L    ALT 26 10 - 44 U/L    Anion Gap 6 (L) 8 - 16 mmol/L    eGFR if African American >60 >60 mL/min/1.73 m^2    eGFR if non African American >60 >60 mL/min/1.73 m^2   CBC auto differential    Collection Time: 03/12/17  2:06 PM   Result Value Ref Range    WBC 9.70 3.90 - 12.70 K/uL    RBC 3.63 (L) 4.00 - 5.40 M/uL    Hemoglobin 9.8 (L) 12.0 - 16.0 g/dL    Hematocrit 31.0 (L) 37.0 - 48.5 %    MCV 85 82 - 98 fL    MCH 27.0 27.0 - 31.0 pg    MCHC  31.6 (L) 32.0 - 36.0 %    RDW 15.8 (H) 11.5 - 14.5 %    Platelets 297 150 - 350 K/uL    MPV 10.3 9.2 - 12.9 fL    Gran # CANCELED 1.8 - 7.7 K/uL    Lymph # CANCELED 1.0 - 4.8 K/uL    Mono # CANCELED 0.3 - 1.0 K/uL    Eos # CANCELED 0.0 - 0.5 K/uL    Baso # CANCELED 0.00 - 0.20 K/uL    Gran% 66.0 38.0 - 73.0 %    Lymph% 23.0 18.0 - 48.0 %    Mono% 7.0 4.0 - 15.0 %    Eosinophil% 2.0 0.0 - 8.0 %    Basophil% 1.0 0.0 - 1.9 %    Bands 1.0 %    Platelet Estimate Appears normal     Aniso Slight     Poly Occasional     Hypo Occasional     Differential Method Manual    Protein / creatinine ratio, urine    Collection Time: 03/12/17  2:58 PM   Result Value Ref Range    Protein, Urine Random <7 0 - 15 mg/dL    Creatinine, Random Ur 61.6 15.0 - 325.0 mg/dL    Prot/Creat Ratio, Ur Unable to calculate 0.00 - 0.20        Assessment:     32yo F POD#6 s/p RLTCS/BTL readmitted with postpartum preE w/ SF (BP), on IV MgSO4 for seizure prophylaxis.    Active Hospital Problems    Diagnosis  POA    *Preeclampsia in postpartum period [O14.95]  Yes      Resolved Hospital Problems    Diagnosis Date Resolved POA   No resolved problems to display.        Plan:     - Continue magnesium sulfate, no signs of toxicity at this time  - Close maternal monitoring including UOP and BP   - BP: (135-177)/() 153/79. Required hydralazine 5/10 and then increase to labetalol 300 TID on this shift. Now stable.    - UOP >200cc/hr last 3 hours   - Recheck in 4 hours or PRN  - As patient readmitted postpartum, will plan for full 24 hrs of magnesium    Lana Tello MD

## 2017-03-14 PROCEDURE — 25000003 PHARM REV CODE 250: Performed by: STUDENT IN AN ORGANIZED HEALTH CARE EDUCATION/TRAINING PROGRAM

## 2017-03-14 PROCEDURE — 63600175 PHARM REV CODE 636 W HCPCS: Performed by: STUDENT IN AN ORGANIZED HEALTH CARE EDUCATION/TRAINING PROGRAM

## 2017-03-14 PROCEDURE — 11000001 HC ACUTE MED/SURG PRIVATE ROOM

## 2017-03-14 RX ORDER — NIFEDIPINE 10 MG/1
10 CAPSULE ORAL ONCE
Status: COMPLETED | OUTPATIENT
Start: 2017-03-14 | End: 2017-03-14

## 2017-03-14 RX ORDER — NIFEDIPINE 10 MG/1
10 CAPSULE ORAL EVERY 8 HOURS
Status: DISCONTINUED | OUTPATIENT
Start: 2017-03-14 | End: 2017-03-14

## 2017-03-14 RX ORDER — HYDRALAZINE HYDROCHLORIDE 20 MG/ML
5 INJECTION INTRAMUSCULAR; INTRAVENOUS ONCE
Status: COMPLETED | OUTPATIENT
Start: 2017-03-14 | End: 2017-03-14

## 2017-03-14 RX ORDER — NIFEDIPINE 30 MG/1
60 TABLET, EXTENDED RELEASE ORAL DAILY
Status: DISCONTINUED | OUTPATIENT
Start: 2017-03-15 | End: 2017-03-15

## 2017-03-14 RX ORDER — LABETALOL 200 MG/1
400 TABLET, FILM COATED ORAL 3 TIMES DAILY
Status: DISCONTINUED | OUTPATIENT
Start: 2017-03-14 | End: 2017-03-15

## 2017-03-14 RX ADMIN — DOCUSATE SODIUM 200 MG: 100 CAPSULE, LIQUID FILLED ORAL at 10:03

## 2017-03-14 RX ADMIN — OXYCODONE HYDROCHLORIDE AND ACETAMINOPHEN 1 TABLET: 5; 325 TABLET ORAL at 11:03

## 2017-03-14 RX ADMIN — OXYCODONE HYDROCHLORIDE AND ACETAMINOPHEN 1 TABLET: 5; 325 TABLET ORAL at 08:03

## 2017-03-14 RX ADMIN — NIFEDIPINE 10 MG: 10 CAPSULE, LIQUID FILLED ORAL at 12:03

## 2017-03-14 RX ADMIN — OXYCODONE HYDROCHLORIDE AND ACETAMINOPHEN 1 TABLET: 5; 325 TABLET ORAL at 12:03

## 2017-03-14 RX ADMIN — NIFEDIPINE 10 MG: 10 CAPSULE, LIQUID FILLED ORAL at 05:03

## 2017-03-14 RX ADMIN — LABETALOL HYDROCHLORIDE 400 MG: 200 TABLET, FILM COATED ORAL at 04:03

## 2017-03-14 RX ADMIN — OXYCODONE HYDROCHLORIDE AND ACETAMINOPHEN 1 TABLET: 10; 325 TABLET ORAL at 02:03

## 2017-03-14 RX ADMIN — DOCUSATE SODIUM 200 MG: 100 CAPSULE, LIQUID FILLED ORAL at 08:03

## 2017-03-14 RX ADMIN — LABETALOL HYDROCHLORIDE 400 MG: 200 TABLET, FILM COATED ORAL at 02:03

## 2017-03-14 RX ADMIN — HYDROCHLOROTHIAZIDE 25 MG: 25 TABLET ORAL at 09:03

## 2017-03-14 RX ADMIN — LABETALOL HYDROCHLORIDE 400 MG: 200 TABLET, FILM COATED ORAL at 10:03

## 2017-03-14 RX ADMIN — HYDRALAZINE HYDROCHLORIDE 5 MG: 20 INJECTION INTRAMUSCULAR; INTRAVENOUS at 08:03

## 2017-03-14 RX ADMIN — OXYCODONE HYDROCHLORIDE AND ACETAMINOPHEN 1 TABLET: 5; 325 TABLET ORAL at 07:03

## 2017-03-14 NOTE — DISCHARGE INSTRUCTIONS
Preparation and Hygiene:  1. Shower daily.  2. Wear a clean bra each day and wash daily in warm soapy water.  3. Change wet or moist breast pads frequently.  Moist pads can promote growth of germs.  Actively wash your hands, paying close attention to the area around and under your fingernails, thoroughly with soap and water for 15 seconds before pumping or handling your milk.  Re-wash your hands if you touch anything (scratching your nose, answering the phone, etc) while pumping or handling your milk.   4. Before pumping your breasts, assemble the pump collection kit and have ready the sterile container and labels.  Place these items on a clean surface next to the breast pump.  5. Each time after you have finished pumping, take apart all of the parts of the breast pump collection kit and place them in a separate cleaning container (do not place them in the sink).  Be sure to remove the yellow valve from the breast shield and separate the white membrane from the yellow valve.  Rinse all of these parts with cool water.  Then use a new sponge and/or bottle brush and dishwashing detergent to clean the parts.  Rinse off the soapy water with cool water and air dry on a clean towel covered with a clean cloth.  All parts may also be washed after each use in the top rack of a .  6. Once each day, sanitize all of the parts of the breast pump collection kit.  This can be done by boiling the kit parts for 10 minutes or by using a Quick Clean Micro-Steam Bag made by [employer]  7. If condensation appears in the tubing, continue to run the pump with the tubing attached for 1-2 minutes or until the tubing is dry.   8. Notify your babys nurse or doctor if you become ill or need to take any medication, even over-the-counter medicines.  Collection and Storage of Expressed Breast milk:         1. Pump your breasts at least 8 or more times every 24 hours.  Double pump (both breasts at the same time) for at least 15-20  minutes using the most suction that is comfortable.    2. Write the date and time of pumping and the name of any medications you are taking on the babys pre-printed hospital identification label.   3.    Do not touch the inside of the storage containers or lids.  4.        Tightly screw the lid onto the container and place immediately into the refrigerator for daily use.  5.    Expressed breast milk should be refrigerated or frozen within 4 hours of pumping.  6.        Do not store expressed breast milk on the door of your refrigerator or freezer             where the temperature is warmer.   7.        Refrigerated milk may be stored for up to 7 days.  At this point it can be moved to the freezer for 6 -12 months.  8.        Thaw frozen breast milk in overnight in the refrigerator.  Once milk is thawed it must be used within 24 hours.  9.        Refrigerated breast milk needs to be warmed to room temperature.  Warm by leaving unrefrigerated until it reached room temperature, or place sealed bottle into a cup of warm (not boiling) water or use a bottle warmer.               Never warm breast milk in a microwave or boiling water.    For any questions or concerns call the Lactation Department at [phone]    Breastfeeding Discharge Instructions       Feed the baby at the earliest sign of hunger or comfort  o Hands to mouth, sucking motions  o Rooting or searching for something to suck on  o Dont wait for crying - it is a sign of distress     The feedings may be 8-12 times per 24hrs and will not follow a schedule   Avoid pacifiers and bottles for the first 4 weeks   Alternate the breast you start the feeding with, or start with the breast that feels the fullest   Switch breasts when the baby takes himself off the breast or falls asleep   Keep offering breasts until the baby looks full, no longer gives hunger signs, and stays asleep when placed on his back in the crib   If the baby is sleepy and wont wake for a  feeding, put the baby skin-to-skin dressed in a diaper against the mothers bare chest   Sleep near your baby   The baby should be positioned and latched on to the breast correctly  o Chest-to-chest, chin in the breast  o Babys lips are flipped outward  o Babys mouth is stretched open wide like a shout  o Babys sucking should feel like tugging to the mother  - The baby should be drinking at the breast:  o You should hear swallowing or gulping throughout the feeding  o You should see milk on the babys lips when he comes off the breast  o Your breasts should be softer when the baby is finished feeding  o The baby should look relaxed at the end of feedings  o After the 4th day and your milk is in:  o The babys poop should turn bright yellow and be loose, watery, and seedy  o The baby should have at least 3-4 poops the size of the palm of your hand per day  o The baby should have at least 5-6 wet diapers per day  o The urine should be light yellow in color  You should drink when you are thirsty and eat a healthy diet when you are    hungry.     Take naps to get the rest you need.   Take medications and/or drink alcohol only with permission of your obstetrician    or the babys pediatrician.  You can also call the [facility],   ([phone]), Monday-Friday, 8am-5pm Central time, to get the most   up-to-date evidence-based information on the use of medications during   pregnancy and breastfeeding.      The baby should be examined by a pediatrician at 3-5 days of age.  Once your   milk comes in, the baby should be gaining at least ½ - 1oz each day and should be back to birthweight no later than 10-14 days of age.          Community Resources    [facility] Breastfeeding Warmline: [phone]   Local [facility] clinics: provide incentives and breastpumps to eligible mothers  [employer]ec[employer] (EMRELI):  mother-to-mother support group website        www.lll.org  Local La Leche League  mother-to-mother support groups:        www.myhomemove.BuzzStream        La Leche League [facility]   Dr. Aston Bernner website for latch videos and general information:        www.breastfeedinginc.ca  [facility] is a call center that provides information about the safety of taking medications while breastfeeding.  Call [phone], M-F, 8am-5pm, [place].  International Lactation Consultant Association provides resources for assistance:        www.ilca.org  [facility] Breastfeeding Coalition provides informationand resources for parents  and the community    http://[facility] Healthastfeeding.org     Citlaly Perales is a mom-to-mom support group:                             www.nolanesting.BuzzStream//breastfeedng-support/  [employer] for Healthy Babies:  3-578-639-BABY(1736)  [employer]: a breastfeeding support group for women of color, [phone]

## 2017-03-14 NOTE — MEDICAL/APP STUDENT
Delivery Discharge Summary  Obstetrics      Primary OB Clinician: Merari Mosquera    Admission date: 3/12/2017  Discharge date: 2017    Disposition: To home, self care    Admit Dx:      Patient Active Problem List   Diagnosis    Bleeding in early pregnancy    Family history of congenital heart defect    Family history of open neural tube defect    G6PD deficiency    History of pre-eclampsia in prior pregnancy, currently pregnant in third trimester    Previous  section complicating pregnancy    Gestational diabetes mellitus (GDM) in third trimester    Obesity affecting pregnancy in third trimester    Group B Streptococcus carrier, +RV culture, currently pregnant    Gestational diabetes mellitus (GDM) affecting third pregnancy    S/P repeat low transverse     Admission for sterilization    Preeclampsia in postpartum period     Discharge Dx:    Patient Active Problem List   Diagnosis    Bleeding in early pregnancy    Family history of congenital heart defect    Family history of open neural tube defect    G6PD deficiency    History of pre-eclampsia in prior pregnancy, currently pregnant in third trimester    Previous  section complicating pregnancy    Gestational diabetes mellitus (GDM) in third trimester    Obesity affecting pregnancy in third trimester    Group B Streptococcus carrier, +RV culture, currently pregnant    Gestational diabetes mellitus (GDM) affecting third pregnancy    S/P repeat low transverse     Admission for sterilization    Preeclampsia in postpartum period       Procedure: , due to LTCS    Hospital Course:  Zhanna Carlton is a 31 y.o. now , POD #8 who was admitted on 3/12/2017 for postpartum preeclampsia with severe features (BP). On initial assessment, vital signs were unstable and physical exam was normal. Patient was subsequently admitted to Mother/Baby unit with signed consents. Hospital course was managed  with mag, hydralazine, Lasix, fioricet, percocet/anaprox, labetalol, HCTZ. On discharge day, patient's pain is controlled with oral pain medications. Pt is tolerating ambulation without SOB or CP, and PO diet without N/V. Denies any HA, vision changes, F/C, LE swelling. Denies any breast pain/soreness.    Pt in stable condition and ready for discharge. She has been instructed to continue ***as indicated as well as pain medications as needed and to follow up in the OB clinic in ***6 weeks with her obstetrics provider.    Pertinent studies:  Postpartum CBC  Lab Results   Component Value Date    WBC 9.70 2017    HGB 9.8 (L) 2017    HCT 31.0 (L) 2017    MCV 85 2017     2017       Delivery:    Episiotomy:     Lacerations:     Repair suture:     Repair # of packets: 10   Blood loss (ml): 0     Birth information:  YOB: 2019   Time of birth: 7:18 AM   Sex: male   Delivery type: , Low Transverse   Gestational Age: 39w2d    Delivery Clinician:      Other providers:       Additional  information:  Forceps:    Vacuum:    Breech:    Observed anomalies      Living?:           APGARS  One minute Five minutes Ten minutes   Skin color:         Heart rate:         Grimace:         Muscle tone:         Breathing:         Totals: 9  9        Placenta: Delivered:       appearance      Patient Instructions:   Current Discharge Medication List      CONTINUE these medications which have NOT CHANGED    Details   ibuprofen (ADVIL,MOTRIN) 600 MG tablet Take 1 tablet (600 mg total) by mouth 3 (three) times daily.  Qty: 30 tablet, Refills: 2    Associated Diagnoses: Obesity affecting pregnancy in third trimester; S/P repeat low transverse ; Previous  section complicating pregnancy; Diet controlled gestational diabetes mellitus (GDM) in third trimester; History of pre-eclampsia in prior pregnancy, currently pregnant in third trimester      labetalol (NORMODYNE) 100 MG  tablet Take 1 tablet (100 mg total) by mouth 2 (two) times daily.  Qty: 60 tablet, Refills: 11    Associated Diagnoses: Postpartum hypertension      oxycodone (ROXICODONE) 5 MG immediate release tablet Take 1 tablet (5 mg total) by mouth every 4 (four) hours as needed.  Qty: 20 tablet, Refills: 0    Associated Diagnoses: Obesity affecting pregnancy in third trimester; S/P repeat low transverse ; Previous  section complicating pregnancy; Diet controlled gestational diabetes mellitus (GDM) in third trimester; History of pre-eclampsia in prior pregnancy, currently pregnant in third trimester      ascorbic acid, vitamin C, (VITAMIN C) 100 MG tablet Take 100 mg by mouth once daily.      hydrocortisone (ANUSOL-HC) 2.5 % rectal cream Apply rectally 2 times daily  Qty: 30 g, Refills: 1    Associated Diagnoses: Hemorrhoids during pregnancy in third trimester      prenatal cmb#95-iron-FA-dha 28 mg iron-800 mcg-200 mg Cmpk Take by mouth.             No discharge procedures on file.

## 2017-03-14 NOTE — PLAN OF CARE
Problem: Patient Care Overview  Goal: Plan of Care Review  Outcome: Ongoing (interventions implemented as appropriate)  HBP pressures this shift, see flow sheet. Safety precautions maintained; bed locked, siderails up x2. Intake and output managed. Patient has denied any visual changes, RUQ pain, or headaches. Will continue to monitor.

## 2017-03-14 NOTE — PROGRESS NOTES
"PROGRESS NOTE  ANTEPARTUM    Admit Date: 3/12/2017   LOS: 2 days     Reason for Admission:  Preeclampsia in postpartum period    SUBJECTIVE:     Zhanna Carlton is a 31 y.o. female at POD#8 s/p RLTCS w/BTL who is here for management of postpartum preeclampsia with severe features (BP).  She is s/p 24 hours of magnesium sulfate for seizure prophylaxis.  Overnight, patient had severe range elevations in BP.  Her labetalol was increased to 400mg TID and she was given 25 mg of HCTZ.  She also reported a dull headache which has resolved this morning.  Headache is positional, worse when standing, better when lying flat. She states her head "just feels weird."  She was evaluate by anesthesia yesterday who believe the HA could be a spinal headache.   This morning she is resting comfortably.  Denies visual disturbance and RUQ pain.  No pain.  No CP nor SOB.    Scheduled Meds:   docusate sodium  200 mg Oral BID    hydrochlorothiazide  25 mg Oral Daily    labetalol  400 mg Oral TID     Continuous Infusions:   magnesium sulfate in water Stopped (03/13/17 1430)     PRN Meds:calcium gluconate, diphenhydrAMINE, hydrocortisone, lanolin, magnesium hydroxide 400 mg/5 ml, naproxen, ondansetron, oxycodone-acetaminophen, oxycodone-acetaminophen, promethazine (PHENERGAN) IVPB, senna-docusate 8.6-50 mg, simethicone    Review of patient's allergies indicates:  No Known Allergies    OBJECTIVE:     Vital Signs (Most Recent)  Temp: 97.9 °F (36.6 °C) (03/14/17 0444)  Pulse: 68 (03/14/17 0444)  Resp: 18 (03/14/17 0444)  BP: (!) 176/94 (03/14/17 0444)  SpO2: 96 % (03/14/17 0000)    Temperature Range Min/Max (Last 24H):  Temp:  [97.9 °F (36.6 °C)-98.4 °F (36.9 °C)]     Vital Signs Range (Last 24H):  Temp:  [97.9 °F (36.6 °C)-98.4 °F (36.9 °C)]   Pulse:  [66-89]   Resp:  [18-20]   BP: (127-180)/(63-98)   SpO2:  [94 %-98 %]     I & O (Last 24H):    Intake/Output Summary (Last 24 hours) at 03/14/17 0680  Last data filed at 03/14/17 0230   " Gross per 24 hour   Intake             2250 ml   Output             2215 ml   Net               35 ml       Physical Exam:  General: well developed, well nourished, no distress  Lungs:  clear to auscultation bilaterally and normal respiratory effort  Heart: regular rate and rhythm, S1, S2 normal, no murmur, click, rub or gallop  Abdomen: soft, non-tender non-distented; bowel sounds normal; no masses,  no organomegaly  Extremities: no cyanosis or clubbing, 1+ pedal edema,  2+ DTR    Laboratory:  No results found for this or any previous visit (from the past 12 hour(s)).       ASSESSMENT/PLAN:     Active Hospital Problems    Diagnosis  POA    *Preeclampsia in postpartum period [O14.95]  Yes      Resolved Hospital Problems    Diagnosis Date Resolved POA   No resolved problems to display.       Assessment:   31 y.o.female  at 39w2d HD#2 for preE w/SF currently on mag.      Plan:  PreE w/SF  - Asymptomatic  - s/p magnesium sulfate  - Good UOP  - PreE labs WNL  - S/p hydralazine 5,5,10.  Lasix 20 x1 3/12  - Labetalol increased yesterday to 400mg TID  - HCTZ 25mg given overnight  - patient with severe range BPs this AM   - BP: (127-180)/() 160/94   - hydralazine 5mg IV given this AM x1  - Will continue to monitor BPs, if patient begins to have severe range pressures again, will trial procardia PO and add procardia XL if successful    Spinal HA  - evaluated by anesthesia 3/13  - Recommend supportive care, caffeine, continue with drinking coffee +/- Fioricet per primary.   - Anesthesia discussed with pt that if headache continues despite supportive treatment then the next step for managing a potential spinal headache is to perform a blood patch.         Flor Britton MD  Ob/Gyn PGY-1  Pager # [phone]

## 2017-03-14 NOTE — PLAN OF CARE
Problem: Breastfeeding (Adult,Obstetrics,Pediatric)  Goal: Signs and Symptoms of Listed Potential Problems Will be Absent, Minimized or Managed (Breastfeeding)  Signs and symptoms of listed potential problems will be absent, minimized or managed by discharge/transition of care (reference Breastfeeding (Adult,Obstetrics,Pediatric) CPG).  Outcome: Ongoing (interventions implemented as appropriate)    03/14/17 1204   Breastfeeding   Problems Assessed (Breastfeeding) all   Problems Present (Breastfeeding) none   Continue to pump / nurse the baby 8-10 times per 24 hours. Follow pumping education and pump for milk storage when infant is not visiting.

## 2017-03-14 NOTE — LACTATION NOTE
03/14/17 0910   Maternal Infant Assessment   Breast Density Bilateral:;full   Breasts WDL   Breasts WDL WDL   Pain/Comfort Assessments   Pain Assessment Performed Yes       Number Scale   Presence of Pain denies   Location nipple(s)   Pain Rating: Rest 0   Pain Rating: Activity 0   Maternal Infant Feeding   Maternal Emotional State independent;relaxed   Time Spent (min) 0-15 min   Breastfeeding Education adequate infant intake;increasing milk supply;label/storage of breast milk;milk expression, electric pump   Equipment Type/Education   Pump Type Symphony   Breast Pump Type double electric, hospital grade   Breast Pump Flange Type hard   Breast Pumping Bilateral Breasts:;pumped until emptied   Pumping Frequency (times) (reinforced 8-10 per 24 hours )   Lactation Referrals   Lactation Consult Follow up   Lactation Interventions   Breastfeeding Assistance milk expression/pumping   lactation rounds, education for pumping mother reviewed. Pt has no concerns or questions at this time . Encouraged to call LC as needed.

## 2017-03-15 PROCEDURE — 11000001 HC ACUTE MED/SURG PRIVATE ROOM

## 2017-03-15 PROCEDURE — 25000003 PHARM REV CODE 250: Performed by: STUDENT IN AN ORGANIZED HEALTH CARE EDUCATION/TRAINING PROGRAM

## 2017-03-15 RX ORDER — LABETALOL 300 MG/1
600 TABLET, FILM COATED ORAL 3 TIMES DAILY
Status: DISCONTINUED | OUTPATIENT
Start: 2017-03-15 | End: 2017-03-16

## 2017-03-15 RX ORDER — LABETALOL 200 MG/1
200 TABLET, FILM COATED ORAL ONCE
Status: COMPLETED | OUTPATIENT
Start: 2017-03-15 | End: 2017-03-15

## 2017-03-15 RX ORDER — NIFEDIPINE 30 MG/1
90 TABLET, EXTENDED RELEASE ORAL DAILY
Status: DISCONTINUED | OUTPATIENT
Start: 2017-03-15 | End: 2017-03-16

## 2017-03-15 RX ADMIN — OXYCODONE HYDROCHLORIDE AND ACETAMINOPHEN 1 TABLET: 5; 325 TABLET ORAL at 07:03

## 2017-03-15 RX ADMIN — NIFEDIPINE 90 MG: 30 TABLET, FILM COATED, EXTENDED RELEASE ORAL at 08:03

## 2017-03-15 RX ADMIN — HYDROCHLOROTHIAZIDE 25 MG: 25 TABLET ORAL at 08:03

## 2017-03-15 RX ADMIN — OXYCODONE HYDROCHLORIDE AND ACETAMINOPHEN 1 TABLET: 5; 325 TABLET ORAL at 01:03

## 2017-03-15 RX ADMIN — LABETALOL HYDROCHLORIDE 200 MG: 200 TABLET, FILM COATED ORAL at 09:03

## 2017-03-15 RX ADMIN — LABETALOL HYDROCHLORIDE 400 MG: 200 TABLET, FILM COATED ORAL at 06:03

## 2017-03-15 RX ADMIN — OXYCODONE HYDROCHLORIDE AND ACETAMINOPHEN 1 TABLET: 5; 325 TABLET ORAL at 11:03

## 2017-03-15 RX ADMIN — OXYCODONE HYDROCHLORIDE AND ACETAMINOPHEN 1 TABLET: 5; 325 TABLET ORAL at 06:03

## 2017-03-15 RX ADMIN — LABETALOL HCL 600 MG: 300 TABLET, FILM COATED ORAL at 01:03

## 2017-03-15 RX ADMIN — LABETALOL HCL 600 MG: 300 TABLET, FILM COATED ORAL at 10:03

## 2017-03-15 NOTE — PROGRESS NOTES
PROGRESS NOTE  ANTEPARTUM    Admit Date: 3/12/2017   LOS: 3 days     Reason for Admission:  Preeclampsia in postpartum period    SUBJECTIVE:     Zhanna Carlton is a 31 y.o. female at POD#9 s/p RLTCS w/BTL who is here for management of postpartum preeclampsia with severe features (BP).  She is s/p 24 hours of magnesium sulfate for seizure prophylaxis.  This morning, patient has had severe range elevations in BP although not sustained.  She is currently on labetalol 400 mg TID and required two doses of procardia 10mg Po yesterday.  Patient was previously diagnosed with a spinal headache managed conservatively.  She currently denies Ha, denies visual changes, denies RUQ pain, denies SOB, denies CP, denies N/V.  She slept well overnight and is resting comfortably in bed.     Scheduled Meds:   docusate sodium  200 mg Oral BID    hydrochlorothiazide  25 mg Oral Daily    labetalol  400 mg Oral TID    nifedipine 30 MG ORAL TR24  90 mg Oral Daily     Continuous Infusions:     PRN Meds:diphenhydrAMINE, hydrocortisone, lanolin, magnesium hydroxide 400 mg/5 ml, naproxen, ondansetron, oxycodone-acetaminophen, oxycodone-acetaminophen, promethazine (PHENERGAN) IVPB, senna-docusate 8.6-50 mg, simethicone    Review of patient's allergies indicates:  No Known Allergies    OBJECTIVE:     Vital Signs (Most Recent)  Temp: 98 °F (36.7 °C) (03/15/17 0356)  Pulse: 61 (03/15/17 0356)  Resp: 16 (03/15/17 0356)  BP: (!) 160/88 (03/15/17 0356)  SpO2: 99 % (03/15/17 0356)    Temperature Range Min/Max (Last 24H):  Temp:  [97.8 °F (36.6 °C)-98.9 °F (37.2 °C)]     Vital Signs Range (Last 24H):  Temp:  [97.8 °F (36.6 °C)-98.9 °F (37.2 °C)]   Pulse:  [60-71]   Resp:  [16-20]   BP: (140-178)/()   SpO2:  [94 %-99 %]     I & O (Last 24H):    Intake/Output Summary (Last 24 hours) at 03/15/17 0619  Last data filed at 03/15/17 0607   Gross per 24 hour   Intake             2900 ml   Output             3460 ml   Net             -560 ml        Physical Exam:  General: well developed, well nourished, no distress  Lungs:  clear to auscultation bilaterally and normal respiratory effort  Heart: regular rate and rhythm, S1, S2 normal, no murmur, click, rub or gallop  Abdomen: soft, non-tender non-distented; bowel sounds normal; no masses,  no organomegaly, pfannenstiel incision without erythema, induration, or drainage  Extremities: no cyanosis or clubbing, 1+ pedal edema    Laboratory:  No results found for this or any previous visit (from the past 12 hour(s)).       ASSESSMENT/PLAN:     Active Hospital Problems    Diagnosis  POA    *Preeclampsia in postpartum period [O14.95]  Yes      Resolved Hospital Problems    Diagnosis Date Resolved POA   No resolved problems to display.       Assessment:   31 y.o.female  at 39w2d HD#2 for preE w/SF currently on mag.      Plan:  PreE w/SF  - Asymptomatic  - s/p magnesium sulfate  - Good UOP, down 11 lbs since yesterday  - PreE labs WNL   - patient with severe range BPs this AM   - BP: (140-178)/() 160/88   - s/p procardia 10 mg PO x2 yesterday   - Continue labetalol 400mg TID  - Will trial procardia 90XL today and continue to monitor    Spinal HA  - evaluated by anesthesia 3/13  - Recommend supportive care, caffeine, continue with drinking coffee +/- Fioricet per primary.   - Anesthesia discussed with pt that if headache continues despite supportive treatment then the next step for managing a potential spinal headache is to perform a blood patch.         Flor Britton MD  Ob/Gyn PGY-1  Pager # [phone]

## 2017-03-15 NOTE — PLAN OF CARE
Problem: Patient Care Overview  Goal: Plan of Care Review  Outcome: Ongoing (interventions implemented as appropriate)  BP elevated during the night. Dr. Domínguez notified each time. No new orders through the night. Pt has been tearful and frustrated with being hospitalized. Encouraged pt to use relaxation techniques and distraction and get rest. Pt rested well through the night but pressures remain high. Adequate intake and output tonight. No distress

## 2017-03-15 NOTE — LACTATION NOTE
Lactation note- Name and number on board, encouraged patient to call for questions, comments. Patient acknowledges understanding.

## 2017-03-16 VITALS
WEIGHT: 233.63 LBS | HEART RATE: 69 BPM | SYSTOLIC BLOOD PRESSURE: 144 MMHG | TEMPERATURE: 98 F | BODY MASS INDEX: 38.92 KG/M2 | OXYGEN SATURATION: 99 % | RESPIRATION RATE: 18 BRPM | HEIGHT: 65 IN | DIASTOLIC BLOOD PRESSURE: 85 MMHG

## 2017-03-16 PROCEDURE — 25000003 PHARM REV CODE 250: Performed by: STUDENT IN AN ORGANIZED HEALTH CARE EDUCATION/TRAINING PROGRAM

## 2017-03-16 RX ORDER — NIFEDIPINE 30 MG/1
30 TABLET, EXTENDED RELEASE ORAL DAILY
Qty: 30 TABLET | Refills: 11 | Status: SHIPPED | OUTPATIENT
Start: 2017-03-16 | End: 2017-03-16

## 2017-03-16 RX ORDER — NIFEDIPINE 30 MG/1
30 TABLET, EXTENDED RELEASE ORAL DAILY
Qty: 30 TABLET | Refills: 11 | Status: ON HOLD | OUTPATIENT
Start: 2017-03-16 | End: 2017-03-19 | Stop reason: HOSPADM

## 2017-03-16 RX ORDER — LISINOPRIL 10 MG/1
10 TABLET ORAL DAILY
Status: DISCONTINUED | OUTPATIENT
Start: 2017-03-16 | End: 2017-03-16 | Stop reason: HOSPADM

## 2017-03-16 RX ORDER — LISINOPRIL 10 MG/1
10 TABLET ORAL DAILY
Qty: 30 TABLET | Refills: 11 | Status: SHIPPED | OUTPATIENT
Start: 2017-03-16 | End: 2017-03-16

## 2017-03-16 RX ORDER — LISINOPRIL 10 MG/1
10 TABLET ORAL DAILY
Qty: 30 TABLET | Refills: 11 | Status: ON HOLD | OUTPATIENT
Start: 2017-03-16 | End: 2017-03-19 | Stop reason: HOSPADM

## 2017-03-16 RX ADMIN — OXYCODONE HYDROCHLORIDE AND ACETAMINOPHEN 1 TABLET: 5; 325 TABLET ORAL at 06:03

## 2017-03-16 RX ADMIN — LISINOPRIL 10 MG: 10 TABLET ORAL at 08:03

## 2017-03-16 RX ADMIN — DOCUSATE SODIUM 200 MG: 100 CAPSULE, LIQUID FILLED ORAL at 08:03

## 2017-03-16 RX ADMIN — LABETALOL HCL 600 MG: 300 TABLET, FILM COATED ORAL at 06:03

## 2017-03-16 RX ADMIN — OXYCODONE HYDROCHLORIDE AND ACETAMINOPHEN 1 TABLET: 5; 325 TABLET ORAL at 01:03

## 2017-03-16 NOTE — PLAN OF CARE
"Problem: Patient Care Overview  Goal: Plan of Care Review  Outcome: Outcome(s) achieved Date Met:  03/16/17  LACTATION NOTE:  Mother will hold baby skin-to-skin as much as possible; will nurse baby on cue till content; will wake baby to feed prn; will ensure deep latch and observe for signs of milk transfer; will monitor baby's 24hr output; if unable to latch baby onto breasts, will continue to pump "8 or more in 24" till empty and supplement baby with expressed milk until baby will resume nursing; will call Warmline prn.      "

## 2017-03-16 NOTE — PLAN OF CARE
Problem: Patient Care Overview  Goal: Plan of Care Review  Outcome: Ongoing (interventions implemented as appropriate)  Blood pressure stable this shift 150's/80's. Pt rested well. Minimal complaints of pain. InterDry fabric placed over incision due to odor and incision being in fold. Reinforced infection control practices with incision. Pt verbalized inderstanding.

## 2017-03-16 NOTE — LACTATION NOTE
"Visited mother in room, reviewed use and care of electric breastpump and double collection kit; labeling, storage, & transportation of expressed milk.  Discussed supply-demand principle and strongly encouraged mother to increase pumping frequency to "8 or more in 24".  Mother states baby will come to visit her this afternoon, encouraged her to hold the baby skin-to-skin and to breastfeed baby on cue till content - to only pump if baby does not latch and drink effectively while nursing.  Mother able to repeat instructions.  "

## 2017-03-16 NOTE — DISCHARGE SUMMARY
Discharge Summary  Gynecology      Admit Date: 3/12/2017    Discharge Date and Time: 3/16/2017 5:10 PM    Attending Physician: Merari Mosquera MD    Principal Diagnoses: Preeclampsia in postpartum period    Active Hospital Problems    Diagnosis  POA    *Preeclampsia in postpartum period [O14.95]  Yes      Resolved Hospital Problems    Diagnosis Date Resolved POA   No resolved problems to display.       Procedures: Procedure(s) (LRB):  DELIVERY- SECTION (N/A)    Discharged Condition: good    Hospital Course: Ms. Carlton is a 31 y.o.  who presented to [facility] triage on POD#6 s/p RLTCS/BTL with elevated BP. Pt had hx of postpartum preeclampsia in both prior pregnancy. She was found to have sustained severe range pressures in [place] and was treated with IV hydralazine and started on a 24 hour course of IV magnesium. Antihypertensive medications were titrated through the remainder of her stay, with blood pressure control ultimately coming with lisinopril 10mg daily and procardia 30XL. Pt remained without symptoms during her stay. She was discharged home to self care with instruction to follow by phone with Dr. Langley in the morning and with Dr. Mosquera in clinic in one week for a BP check. Preeclampsia and routine postoperative precautions were given.    Consults: 1. Maternal fetal medicine    Significant Diagnostic Studies:    Recent Labs  Lab 03/10/17  2205 17  1406   WBC 9.08 9.70   HGB 9.8* 9.8*   HCT 30.5* 31.0*   MCV 86 85    297        Treatments:   1. Surgery as above    Disposition: Home or Self Care    Patient Instructions:   Current Discharge Medication List      START taking these medications    Details   lisinopril 10 MG tablet Take 1 tablet (10 mg total) by mouth once daily.  Qty: 30 tablet, Refills: 11    Associated Diagnoses: Hypertension in pregnancy, preeclampsia, severe, delivered/postpartum      nifedipine 30 MG ORAL TR24 (PROCARDIA-XL) 30 MG (OSM) 24 hr tablet Take 1  tablet (30 mg total) by mouth once daily.  Qty: 30 tablet, Refills: 11    Associated Diagnoses: Hypertension in pregnancy, preeclampsia, severe, delivered/postpartum         CONTINUE these medications which have NOT CHANGED    Details   ibuprofen (ADVIL,MOTRIN) 600 MG tablet Take 1 tablet (600 mg total) by mouth 3 (three) times daily.  Qty: 30 tablet, Refills: 2    Associated Diagnoses: Obesity affecting pregnancy in third trimester; S/P repeat low transverse ; Previous  section complicating pregnancy; Diet controlled gestational diabetes mellitus (GDM) in third trimester; History of pre-eclampsia in prior pregnancy, currently pregnant in third trimester      oxycodone (ROXICODONE) 5 MG immediate release tablet Take 1 tablet (5 mg total) by mouth every 4 (four) hours as needed.  Qty: 20 tablet, Refills: 0    Associated Diagnoses: Obesity affecting pregnancy in third trimester; S/P repeat low transverse ; Previous  section complicating pregnancy; Diet controlled gestational diabetes mellitus (GDM) in third trimester; History of pre-eclampsia in prior pregnancy, currently pregnant in third trimester      ascorbic acid, vitamin C, (VITAMIN C) 100 MG tablet Take 100 mg by mouth once daily.      hydrocortisone (ANUSOL-HC) 2.5 % rectal cream Apply rectally 2 times daily  Qty: 30 g, Refills: 1    Associated Diagnoses: Hemorrhoids during pregnancy in third trimester      prenatal cmb#95-iron-FA-dha 28 mg iron-800 mcg-200 mg Cmpk Take by mouth.         STOP taking these medications       labetalol (NORMODYNE) 100 MG tablet Comments:   Reason for Stopping:                 Discharge Procedure Orders  Diet general     Activity as tolerated   Order Comments: Nothing in the vagina for 6 weeks. Gradual, progressive resumption of regular daily activity. Patient may drive in two weeks so long as she is not taking narcotics.     Call MD for:   Order Comments: fever >100.4 F, SOB/lightheadedness,  persistent nausea and vomiting, uncontrolled pain or vaginal bleeding saturating a heavy pad in an hour for two consecutive hours         Follow-up Information     Schedule an appointment as soon as possible for a visit with Merari Mosquera MD.    Specialties:  Obstetrics, Obstetrics and Gynecology    Why:  Postpartum appointment/BP check    Contact information:    [address] [place] [id number] [phone]          Follow up with Sparkle Langley MD. Call in 1 day.    Specialties:  Obstetrics, Obstetrics and Gynecology    Why:  BP follow up    Contact information:    [address] [facility] [address]  [facility] [id number] [phone]            RICHARD Gonzalez MD/MPH  Obstetrics & Gynecology, PGY 4  [phone]

## 2017-03-16 NOTE — PROGRESS NOTES
PROGRESS NOTE  ANTEPARTUM    Admit Date: 3/12/2017   LOS: 4 days     Reason for Admission:  Preeclampsia in postpartum period    SUBJECTIVE:     Zhanna Carlton is a 31 y.o. female at POD#10 s/p RLTCS w/BTL who is here for management of postpartum preeclampsia with severe features (BP).  She is s/p 24 hours of magnesium sulfate for seizure prophylaxis. Last night patient had a few severe range elevations in BP although not sustained.  This AM, BPs have remained within mild range.  Patient was increased to labetalol 600 mg TID in addition to starting procardia 90 XL.    Patient is doing well this AM. She denies HA,  denies visual changes, denies RUQ pain, denies SOB, denies CP, denies N/V.  She reports pain is well controlled with pain medication.  She states lochia is mild to moderate and stable. She is voiding without difficulty and ambulating without difficulty.  She slept well overnight and is resting comfortably in bed.     Scheduled Meds:   docusate sodium  200 mg Oral BID    labetalol  600 mg Oral TID    nifedipine 30 MG ORAL TR24  90 mg Oral Daily     Continuous Infusions:     PRN Meds:diphenhydrAMINE, hydrocortisone, lanolin, magnesium hydroxide 400 mg/5 ml, naproxen, ondansetron, oxycodone-acetaminophen, oxycodone-acetaminophen, promethazine (PHENERGAN) IVPB, senna-docusate 8.6-50 mg, simethicone    Review of patient's allergies indicates:  No Known Allergies    OBJECTIVE:     Vital Signs (Most Recent)  Temp: 98 °F (36.7 °C) (03/16/17 0015)  Pulse: 98 (03/16/17 0015)  Resp: 18 (03/15/17 2021)  BP: (!) 152/86 (pt asleep ) (03/16/17 0405)  SpO2: 99 % (03/16/17 0015)    Temperature Range Min/Max (Last 24H):  Temp:  [98 °F (36.7 °C)-98.3 °F (36.8 °C)]     Vital Signs Range (Last 24H):  Temp:  [98 °F (36.7 °C)-98.3 °F (36.8 °C)]   Pulse:  [60-98]   Resp:  [18]   BP: (140-170)/(70-96)   SpO2:  [98 %-99 %]     I & O (Last 24H):    Intake/Output Summary (Last 24 hours) at 03/16/17 0623  Last data filed at  17 0600   Gross per 24 hour   Intake             3100 ml   Output             1740 ml   Net             1360 ml       Physical Exam:  General: well developed, well nourished, no distress  Lungs:  clear to auscultation bilaterally and normal respiratory effort  Heart: regular rate and rhythm, S1, S2 normal, no murmur, click, rub or gallop  Abdomen: soft, non-tender non-distented; bowel sounds normal; no masses,  no organomegaly, pfannenstiel incision without erythema, induration, or drainage  Extremities: no cyanosis or clubbing, no pedal edema, 1+ reflex left brachioradialis    Laboratory:  No results found for this or any previous visit (from the past 12 hour(s)).       ASSESSMENT/PLAN:     Active Hospital Problems    Diagnosis  POA    *Preeclampsia in postpartum period [O14.95]  Yes      Resolved Hospital Problems    Diagnosis Date Resolved POA   No resolved problems to display.       Assessment:   31 y.o.female  at 39w2d HD#2 for preE w/SF currently on mag.      Plan:  PreE w/SF  - Asymptomatic  - s/p magnesium sulfate  - Good UOP, down 11 lbs since yesterday  - PreE labs WNL   - BPs mild range this AM, BP: (140-170)/(70-96) 152/86  - Continue labetalol 600 mg TID   - Continue procardia 90 XL  - BPs mostly 150s/90s  - Will discuss with staff regarding borderline BPs and possible trial of lisinopril today vs increasing current agent    Spinal HA  - evaluated by anesthesia 3/13  - Recommend supportive care, caffeine, continue with drinking coffee +/- Fioricet per primary.   - Anesthesia discussed with pt that if headache continues despite supportive treatment then the next step for managing a potential spinal headache is to perform a blood patch.   - patient states HA is resolved        Flor Britton MD  Ob/Gyn PGY-1  Pager # [phone]

## 2017-03-17 ENCOUNTER — TELEPHONE (OUTPATIENT)
Dept: OBSTETRICS AND GYNECOLOGY | Facility: CLINIC | Age: 32
End: 2017-03-17

## 2017-03-17 NOTE — TELEPHONE ENCOUNTER
----- Message from Cindy Delgado sent at 3/17/2017  9:51 AM CDT -----  Contact: self  PP patient is wanting a call back to discuss her recent visit to the ED. Patient states she went in for high blood pressure and was told to follow up. Patient would also like to know if Lisinopro is safe for breastfeeding. Patient can be reached at [phone].

## 2017-03-17 NOTE — TELEPHONE ENCOUNTER
Spoke with pt. Pt states she seen  yesterday when she was in the hospital for PRE-E. Pt  states she was advised to monitor pressure overnight to see if she needs to increase or change her blood pressure medication. Here are the following readings on manual cuff:      8:00pm 142/82  2:45am 152/88  6:20am 142/92  Took Lisinopril @ 9:30 BP was 140/92    10:45am 132/88  11:45am 144/92  1:35pm 138/92

## 2017-03-17 NOTE — TELEPHONE ENCOUNTER
Discussed with patient  BP look good.  Will continue on lisinpril and will hold off on procardia for now.  A Korin

## 2017-03-17 NOTE — TELEPHONE ENCOUNTER
Called pt and told her that Dr. Mosquera has attempted to contacted the lactation department who has the most up to date information about what medications are safe for nursing pt's. Informed pt that the lactation department has not returned her call as of yet but we will contact her as soon as they do. Pt communicated understanding.    Pt says that Dr. Langley discharged her yesterday and asked her to keep track of her blood pressure readings and to contact her with the results. Told pt that I will send a message to her staff and someone will be in touch. Pt communicated understanding.

## 2017-03-17 NOTE — TELEPHONE ENCOUNTER
Told pt that the lactation consultant states that since she is taking a lower dose (10 MG) of lisinopril, there is not much of a chance of it entering her breast milk and affecting her baby. Instructed pt to look out for her baby sleeping more than usual as that would be the only side effect to look out for. Pt communicated understanding.    Told pt that Dr. Mosquera will call her to discuss her blood pressure readings and the effect that her taking lisinopril has on her blood pressure. Pt communicated understanding.

## 2017-03-17 NOTE — TELEPHONE ENCOUNTER
----- Message from Farhan Abbott LPN sent at 3/17/2017  4:34 PM CDT -----  Contact: patient  Please ignore previous message sent regarding pt's blood pressure readings.

## 2017-03-17 NOTE — TELEPHONE ENCOUNTER
----- Message from Farhan Abbott LPN sent at 3/17/2017  3:09 PM CDT -----  Contact: patient  Pt states that Dr. Langley discharged her yesterday and instructed the pt to call her to discuss her blood pressure readings. Pt would like a call back when possible.

## 2017-03-18 ENCOUNTER — HOSPITAL ENCOUNTER (OUTPATIENT)
Facility: OTHER | Age: 32
Discharge: HOME | End: 2017-03-19
Attending: OBSTETRICS & GYNECOLOGY | Admitting: OBSTETRICS & GYNECOLOGY
Payer: MEDICAID

## 2017-03-18 ENCOUNTER — NURSE TRIAGE (OUTPATIENT)
Dept: ADMINISTRATIVE | Facility: CLINIC | Age: 32
End: 2017-03-18

## 2017-03-18 ENCOUNTER — TELEPHONE (OUTPATIENT)
Dept: OBSTETRICS AND GYNECOLOGY | Facility: HOSPITAL | Age: 32
End: 2017-03-18

## 2017-03-18 LAB
ALBUMIN SERPL BCP-MCNC: 3 G/DL
ALP SERPL-CCNC: 109 U/L
ALT SERPL W/O P-5'-P-CCNC: 21 U/L
ANION GAP SERPL CALC-SCNC: 5 MMOL/L
AST SERPL-CCNC: 12 U/L
BASOPHILS # BLD AUTO: 0.03 K/UL
BASOPHILS NFR BLD: 0.3 %
BILIRUB SERPL-MCNC: 0.1 MG/DL
BUN SERPL-MCNC: 17 MG/DL
CALCIUM SERPL-MCNC: 9 MG/DL
CHLORIDE SERPL-SCNC: 110 MMOL/L
CO2 SERPL-SCNC: 26 MMOL/L
CREAT SERPL-MCNC: 0.7 MG/DL
DIFFERENTIAL METHOD: ABNORMAL
EOSINOPHIL # BLD AUTO: 0.5 K/UL
EOSINOPHIL NFR BLD: 4.2 %
ERYTHROCYTE [DISTWIDTH] IN BLOOD BY AUTOMATED COUNT: 15.7 %
EST. GFR  (AFRICAN AMERICAN): >60 ML/MIN/1.73 M^2
EST. GFR  (NON AFRICAN AMERICAN): >60 ML/MIN/1.73 M^2
GLUCOSE SERPL-MCNC: 101 MG/DL
HCT VFR BLD AUTO: 35.6 %
HGB BLD-MCNC: 11.1 G/DL
LYMPHOCYTES # BLD AUTO: 2.4 K/UL
LYMPHOCYTES NFR BLD: 21.3 %
MCH RBC QN AUTO: 26.7 PG
MCHC RBC AUTO-ENTMCNC: 31.2 %
MCV RBC AUTO: 86 FL
MONOCYTES # BLD AUTO: 0.6 K/UL
MONOCYTES NFR BLD: 5 %
NEUTROPHILS # BLD AUTO: 7.7 K/UL
NEUTROPHILS NFR BLD: 68.2 %
PLATELET # BLD AUTO: 377 K/UL
PMV BLD AUTO: 9.9 FL
POTASSIUM SERPL-SCNC: 4 MMOL/L
PROT SERPL-MCNC: 6.6 G/DL
RBC # BLD AUTO: 4.16 M/UL
SODIUM SERPL-SCNC: 141 MMOL/L
WBC # BLD AUTO: 11.33 K/UL

## 2017-03-18 PROCEDURE — G0378 HOSPITAL OBSERVATION PER HR: HCPCS

## 2017-03-18 PROCEDURE — 25000003 PHARM REV CODE 250: Performed by: STUDENT IN AN ORGANIZED HEALTH CARE EDUCATION/TRAINING PROGRAM

## 2017-03-18 PROCEDURE — 80053 COMPREHEN METABOLIC PANEL: CPT

## 2017-03-18 PROCEDURE — 99284 EMERGENCY DEPT VISIT MOD MDM: CPT

## 2017-03-18 PROCEDURE — 85025 COMPLETE CBC W/AUTO DIFF WBC: CPT

## 2017-03-18 PROCEDURE — 25000003 PHARM REV CODE 250: Performed by: OBSTETRICS & GYNECOLOGY

## 2017-03-18 RX ORDER — ONDANSETRON 8 MG/1
8 TABLET, ORALLY DISINTEGRATING ORAL EVERY 8 HOURS PRN
Status: DISCONTINUED | OUTPATIENT
Start: 2017-03-18 | End: 2017-03-19 | Stop reason: HOSPADM

## 2017-03-18 RX ORDER — LABETALOL HYDROCHLORIDE 5 MG/ML
20 INJECTION, SOLUTION INTRAVENOUS ONCE
Status: DISCONTINUED | OUTPATIENT
Start: 2017-03-18 | End: 2017-03-18

## 2017-03-18 RX ORDER — ACETAMINOPHEN 325 MG/1
650 TABLET ORAL EVERY 6 HOURS PRN
Status: DISCONTINUED | OUTPATIENT
Start: 2017-03-18 | End: 2017-03-19 | Stop reason: HOSPADM

## 2017-03-18 RX ORDER — IBUPROFEN 600 MG/1
600 TABLET ORAL EVERY 6 HOURS
Status: DISCONTINUED | OUTPATIENT
Start: 2017-03-19 | End: 2017-03-19 | Stop reason: HOSPADM

## 2017-03-18 RX ORDER — DIPHENHYDRAMINE HYDROCHLORIDE 50 MG/ML
25 INJECTION INTRAMUSCULAR; INTRAVENOUS EVERY 4 HOURS PRN
Status: DISCONTINUED | OUTPATIENT
Start: 2017-03-18 | End: 2017-03-19 | Stop reason: HOSPADM

## 2017-03-18 RX ORDER — OXYCODONE AND ACETAMINOPHEN 5; 325 MG/1; MG/1
1 TABLET ORAL EVERY 4 HOURS PRN
Status: DISCONTINUED | OUTPATIENT
Start: 2017-03-18 | End: 2017-03-19 | Stop reason: HOSPADM

## 2017-03-18 RX ORDER — LISINOPRIL 10 MG/1
10 TABLET ORAL
Status: COMPLETED | OUTPATIENT
Start: 2017-03-18 | End: 2017-03-18

## 2017-03-18 RX ORDER — ZOLPIDEM TARTRATE 5 MG/1
5 TABLET ORAL NIGHTLY PRN
Status: DISCONTINUED | OUTPATIENT
Start: 2017-03-18 | End: 2017-03-19 | Stop reason: HOSPADM

## 2017-03-18 RX ORDER — OXYCODONE AND ACETAMINOPHEN 10; 325 MG/1; MG/1
1 TABLET ORAL EVERY 4 HOURS PRN
Status: DISCONTINUED | OUTPATIENT
Start: 2017-03-18 | End: 2017-03-19 | Stop reason: HOSPADM

## 2017-03-18 RX ORDER — NIFEDIPINE 10 MG/1
10 CAPSULE ORAL EVERY 8 HOURS
Status: DISCONTINUED | OUTPATIENT
Start: 2017-03-18 | End: 2017-03-19 | Stop reason: HOSPADM

## 2017-03-18 RX ORDER — NIFEDIPINE 10 MG/1
10 CAPSULE ORAL ONCE
Status: COMPLETED | OUTPATIENT
Start: 2017-03-18 | End: 2017-03-18

## 2017-03-18 RX ORDER — DOCUSATE SODIUM 100 MG/1
200 CAPSULE, LIQUID FILLED ORAL 2 TIMES DAILY PRN
Status: DISCONTINUED | OUTPATIENT
Start: 2017-03-18 | End: 2017-03-19 | Stop reason: HOSPADM

## 2017-03-18 RX ORDER — DIPHENHYDRAMINE HCL 25 MG
25 CAPSULE ORAL EVERY 4 HOURS PRN
Status: DISCONTINUED | OUTPATIENT
Start: 2017-03-18 | End: 2017-03-19 | Stop reason: HOSPADM

## 2017-03-18 RX ADMIN — IBUPROFEN 600 MG: 600 TABLET, FILM COATED ORAL at 11:03

## 2017-03-18 RX ADMIN — OXYCODONE HYDROCHLORIDE AND ACETAMINOPHEN 1 TABLET: 5; 325 TABLET ORAL at 11:03

## 2017-03-18 RX ADMIN — NIFEDIPINE 10 MG: 10 CAPSULE, LIQUID FILLED ORAL at 08:03

## 2017-03-18 RX ADMIN — NIFEDIPINE 10 MG: 10 CAPSULE, LIQUID FILLED ORAL at 10:03

## 2017-03-18 RX ADMIN — LISINOPRIL 10 MG: 10 TABLET ORAL at 11:03

## 2017-03-18 RX ADMIN — NIFEDIPINE 10 MG: 10 CAPSULE, LIQUID FILLED ORAL at 09:03

## 2017-03-18 NOTE — TELEPHONE ENCOUNTER
Reason for Disposition   BP  >= 160/100    Protocols used: ST HIGH BLOOD PRESSURE-A-    Patient took her medication at 930 am and BP was 166/96.  At 1:30 pm patient took Procardia 30 mg.  BP at 5:00 pm was 166/11. No symptoms noted (no dizziness, vision changes or headaches).  Called on call doctor - Dr. Barb Benz.  Dr. Benz stated that she would call the patient directly, call ended.

## 2017-03-18 NOTE — TELEPHONE ENCOUNTER
Called by nurse on call to say that patient had called with elevated blood pressures, 160's/110's.  Patient reports that she was very relaxed at the time of taking her pressures and that up until today, her blood pressures had been in the normal range.  This am, the patient took her lisinopril, then when her pressures were elevated, she took procardia 30xl (1:30pm), patient remains with elevated BP at this time, 160/110.  Instructed to take another dose of the procardia and take her BP in ojne hour.  IF remains elevated, she was instructed to come into the hospital for evaluation.  Patient voiced understanding of all of these instructions and will come into the hospital for evaluation as needed.

## 2017-03-18 NOTE — IP AVS SNAPSHOT
[facility] Location ([facility])  [address] [place], [place] [id number]  Phone: [phone]           Patient Discharge Instructions     Our goal is to set you up for success. This packet includes information on your condition, medications, and your home care. It will help you to care for yourself so you don't get sicker and need to go back to the hospital.     Please ask your nurse if you have any questions.        There are many details to remember when preparing to leave the hospital. Here is what you will need to do:    1. Take your medicine. If you are prescribed medications, review your Medication List in the following pages. You may have new medications to  at the pharmacy and others that you'll need to stop taking. Review the instructions for how and when to take your medications. Talk with your doctor or nurses if you are unsure of what to do.     2. Go to your follow-up appointments. Specific follow-up information is listed in the following pages. Your may be contacted by a transition nurse or clinical provider about future appointments. Be sure we have all of the phone numbers to reach you, if needed. Please contact your provider's office if you are unable to make an appointment.     3. Watch for warning signs. Your doctor or nurse will give you detailed warning signs to watch for and when to call for assistance. These instructions may also include educational information about your condition. If you experience any of warning signs to your health, call your doctor.               [facility]  Unless otherwise directed by your provider, please contact [facility], our nurse care line that is available for 24/7 assistance.     [phone] (toll-free)    Registered nurses in the [facility] provide clinical advisement, health education, appointment booking, and other advisory services.                    ** Verify the list of medication(s) below is accurate and up to  date. Carry this with you in case of emergency. If your medications have changed, please notify your healthcare provider.             Medication List      START taking these medications        Additional Info                      nifedipine 10 MG Cap   Commonly known as:  PROCARDIA   Quantity:  60 capsule   Refills:  3   Dose:  10 mg   Replaces:  nifedipine 30 MG ORAL TR24 30 MG (OSM) 24 hr tablet    Last time this was given:  10 mg on 3/19/2017  2:58 PM   Instructions:  Take 1 capsule (10 mg total) by mouth every 8 (eight) hours as needed (as needed for BP checked at home systolic > 160/ Diastolic > 110).     Begin Date    AM    Noon    PM    Bedtime         CHANGE how you take these medications        Additional Info                      lisinopril 20 MG tablet   Commonly known as:  PRINIVIL,ZESTRIL   Quantity:  60 tablet   Refills:  3   Dose:  20 mg   What changed:    - medication strength  - how much to take    Last time this was given:  20 mg on 3/19/2017  9:24 AM   Instructions:  Take 1 tablet (20 mg total) by mouth once daily.     Begin Date    AM    Noon    PM    Bedtime         CONTINUE taking these medications        Additional Info                      hydrocortisone 2.5 % rectal cream   Commonly known as:  ANUSOL-HC   Quantity:  30 g   Refills:  1    Instructions:  Apply rectally 2 times daily     Begin Date    AM    Noon    PM    Bedtime       ibuprofen 600 MG tablet   Commonly known as:  ADVIL,MOTRIN   Quantity:  30 tablet   Refills:  2   Dose:  600 mg    Last time this was given:  600 mg on 3/19/2017 12:24 PM   Instructions:  Take 1 tablet (600 mg total) by mouth 3 (three) times daily.     Begin Date    AM    Noon    PM    Bedtime       oxycodone 5 MG immediate release tablet   Commonly known as:  ROXICODONE   Quantity:  20 tablet   Refills:  0   Dose:  5 mg    Instructions:  Take 1 tablet (5 mg total) by mouth every 4 (four) hours as needed.     Begin Date    AM    Noon    PM    Bedtime        prenatal cmb#95-iron-FA-dha 28 mg iron-800 mcg-200 mg Cmpk   Refills:  0    Instructions:  Take by mouth.     Begin Date    AM    Noon    PM    Bedtime       VITAMIN C 100 MG tablet   Refills:  0   Dose:  100 mg   Generic drug:  ascorbic acid (vitamin C)    Instructions:  Take 100 mg by mouth once daily.     Begin Date    AM    Noon    PM    Bedtime         STOP taking these medications     nifedipine 30 MG ORAL TR24 30 MG (OSM) 24 hr tablet   Commonly known as:  PROCARDIA-XL   Replaced by:  nifedipine 10 MG Cap            Where to Get Your Medications      These medications were sent to [facility] [place] [facility] [address]  [address] [address]     Phone:  [phone]     lisinopril 20 MG tablet    nifedipine 10 MG Cap                  Please bring to all follow up appointments:    1. A copy of your discharge instructions.  2. All medicines you are currently taking in their original bottles.  3. Identification and insurance card.    Please arrive 15 minutes ahead of scheduled appointment time.    Please call 24 hours in advance if you must reschedule your appointment and/or time.        Your Scheduled Appointments     Mar 23, 2017 11:30 AM CDT   Post OP with Merari Mosquera MD   [facility] ([facility] [place])    [address] [place] [id number] [phone]            Apr 18, 2017  8:30 AM CDT   Post Partum with Merari Mosquera MD   [facility] [place] [address] ([facility] [place])    [address] [place] [id number] [phone]              Follow-up Information     Follow up with Merari Msoquera MD. Schedule an appointment as soon as possible for a visit in 1 week.    Specialties:  Obstetrics, Obstetrics and Gynecology    Why:  BP CHECK IN ONE WEEK     Contact information:    [address] [place] [address] [phone]          Discharge Instructions     Future Orders    Activity as tolerated     Call  MD for:  difficulty breathing or increased cough     Call MD for:  increased confusion or weakness     Call MD for:  persistent dizziness, light-headedness, or visual disturbances     Call MD for:  persistent nausea and vomiting or diarrhea     Call MD for:  redness, tenderness, or signs of infection (pain, swelling, redness, odor or green/yellow discharge around incision site)     Call MD for:  severe persistent headache     Call MD for:  severe uncontrolled pain     Call MD for:  temperature >100.4     Call MD for:  worsening rash     Call MD for:     Comments:    Heavy vaginal bleeding saturating greater than or equal to 1 pad per hour    Diet general     Questions:    Total calories:      Fat restriction, if any:      Protein restriction, if any:      Na restriction, if any:      Fluid restriction:      Additional restrictions:      No dressing needed     Scheduling Instructions:    Please keep incision clean and dry. Wash daily with soap and water. Allow to air dry completely.    Other restrictions (specify):     Comments:    Nothing in vagina for 6 weeks.        Discharge Instructions       - Take lisinopril 20mg daily  - When you are checking your blood pressure at home, if the top number (sytolic) is greater than 160 or the bottom number (diastolic) is greater than 110 take 10mg of procardia. Re-check your blood pressure 30 minutes later to make sure it has come down to < 160/110.   - Call Dr[facility] with any questions       Primary Diagnosis     Your primary diagnosis was:  High Blood Pressure And Swelling During Pregnancy      Admission Information     Date & Time Provider Department CSN    3/18/2017  7:39 PM Merari Mosquera MD [facility] [id number]      Care Providers     Provider Role Specialty Primary office phone    Merari Mosquera MD Attending Provider Obstetrics [phone]      Your Vitals Were     BP Pulse Temp Resp Last Period SpO2    135/85 82 98.1 °F (36.7  °C) 20 06/04/2016 (Exact Date) 100%      Recent Lab Values     No lab values to display.      Allergies as of 3/19/2017     No Known Allergies      Advance Directives     An advance directive is a document which, in the event you are no longer able to make decisions for yourself, tells your healthcare team what kind of treatment you do or do not want to receive, or who you would like to make those decisions for you.  If you do not currently have an advance directive, [facility] encourages you to create one.  For more information call:  [phone] ([phone]), [phone] ([phone]),  or log on to [email or web address].        Smoking Cessation     If you would like to quit smoking:   You may be eligible for free services if you are a [place] resident and started smoking cigarettes before September 1, 1988.  Call the Smoking Cessation [employer] ([facility]) toll free at (557) 754-6121 or (430) 903-5014.   Call 6-314-QUIT-NOW if you do not meet the above criteria.            Language Assistance Services     ATTENTION: Language assistance services are available, free of charge. Please call [phone].      ATENCIÓN: Si habla español, tiene a atkinson disposición servicios gratuitos de asistencia lingüística. Llame al [phone].     CHÚ Ý: N?u b?n nói Ti?ng Vi?t, có các d?ch v? h? tr? ngôn ng? mi?n phí dành cho b?n. G?i s? [phone].        Diabetes Discharge Instructions                                    [facility] complies with applicable Federal civil rights laws and does not discriminate on the basis of race, color, national origin, age, disability, or sex.

## 2017-03-19 VITALS
TEMPERATURE: 98 F | SYSTOLIC BLOOD PRESSURE: 135 MMHG | HEART RATE: 82 BPM | OXYGEN SATURATION: 100 % | DIASTOLIC BLOOD PRESSURE: 85 MMHG | RESPIRATION RATE: 20 BRPM

## 2017-03-19 PROBLEM — Z91.89 BREASTFEEDING PROBLEM: Status: ACTIVE | Noted: 2017-03-19

## 2017-03-19 PROCEDURE — 25000003 PHARM REV CODE 250: Performed by: OBSTETRICS & GYNECOLOGY

## 2017-03-19 PROCEDURE — 25000003 PHARM REV CODE 250: Performed by: STUDENT IN AN ORGANIZED HEALTH CARE EDUCATION/TRAINING PROGRAM

## 2017-03-19 PROCEDURE — G0378 HOSPITAL OBSERVATION PER HR: HCPCS

## 2017-03-19 RX ORDER — SODIUM CITRATE AND CITRIC ACID MONOHYDRATE 334; 500 MG/5ML; MG/5ML
30 SOLUTION ORAL ONCE
Status: COMPLETED | OUTPATIENT
Start: 2017-03-19 | End: 2017-03-19

## 2017-03-19 RX ORDER — NIFEDIPINE 10 MG/1
10 CAPSULE ORAL EVERY 8 HOURS PRN
Qty: 60 CAPSULE | Refills: 3 | Status: SHIPPED | OUTPATIENT
Start: 2017-03-19 | End: 2017-03-19

## 2017-03-19 RX ORDER — LISINOPRIL 20 MG/1
20 TABLET ORAL DAILY
Qty: 60 TABLET | Refills: 3 | Status: SHIPPED | OUTPATIENT
Start: 2017-03-19 | End: 2017-03-23 | Stop reason: SDUPTHER

## 2017-03-19 RX ORDER — ACETAMINOPHEN 325 MG/1
650 TABLET ORAL ONCE
Status: COMPLETED | OUTPATIENT
Start: 2017-03-19 | End: 2017-03-19

## 2017-03-19 RX ORDER — LISINOPRIL 20 MG/1
20 TABLET ORAL DAILY
Qty: 60 TABLET | Refills: 3 | Status: SHIPPED | OUTPATIENT
Start: 2017-03-19 | End: 2017-03-19

## 2017-03-19 RX ORDER — LISINOPRIL 10 MG/1
20 TABLET ORAL DAILY
Status: DISCONTINUED | OUTPATIENT
Start: 2017-03-19 | End: 2017-03-19 | Stop reason: HOSPADM

## 2017-03-19 RX ORDER — NIFEDIPINE 10 MG/1
10 CAPSULE ORAL EVERY 8 HOURS PRN
Qty: 60 CAPSULE | Refills: 3 | Status: SHIPPED | OUTPATIENT
Start: 2017-03-19 | End: 2017-04-18

## 2017-03-19 RX ADMIN — NIFEDIPINE 10 MG: 10 CAPSULE, LIQUID FILLED ORAL at 02:03

## 2017-03-19 RX ADMIN — IBUPROFEN 600 MG: 600 TABLET, FILM COATED ORAL at 12:03

## 2017-03-19 RX ADMIN — IBUPROFEN 600 MG: 600 TABLET, FILM COATED ORAL at 06:03

## 2017-03-19 RX ADMIN — LISINOPRIL 20 MG: 10 TABLET ORAL at 09:03

## 2017-03-19 RX ADMIN — NIFEDIPINE 10 MG: 10 CAPSULE, LIQUID FILLED ORAL at 06:03

## 2017-03-19 RX ADMIN — OXYCODONE HYDROCHLORIDE AND ACETAMINOPHEN 1 TABLET: 5; 325 TABLET ORAL at 04:03

## 2017-03-19 RX ADMIN — SODIUM CITRATE AND CITRIC ACID MONOHYDRATE 30 ML: 500; 334 SOLUTION ORAL at 12:03

## 2017-03-19 RX ADMIN — OXYCODONE HYDROCHLORIDE AND ACETAMINOPHEN 1 TABLET: 5; 325 TABLET ORAL at 08:03

## 2017-03-19 RX ADMIN — ACETAMINOPHEN 650 MG: 325 TABLET ORAL at 09:03

## 2017-03-19 NOTE — DISCHARGE INSTRUCTIONS
- Take lisinopril 20mg daily  - When you are checking your blood pressure at home, if the top number (sytolic) is greater than 160 or the bottom number (diastolic) is greater than 110 take 10mg of procardia. Re-check your blood pressure 30 minutes later to make sure it has come down to < 160/110.   - Call Dr. Mosquera[facility] with any questions

## 2017-03-19 NOTE — H&P
HISTORY AND PHYSICAL  MBU          Subjective:       Zhanna Carlton is a 31 y.o.  female now s/p RLTCS/BTL on 3/6/17 whose postpartum course has been complicated by preeclampsia with severe features. She was discharged on Thursday after being admitted last  with new onset postpartum preE with SF. During that admission, she was treated with MgSO4 and BP was ultimately controlled with lisinopril and procardia. She was discharged on that regimen, but at home pressures were suboptimally controlled today and she returned to ED as instructed.  She denies HA, visual changes, SOB or RUQ pain.    PMHx:   Past Medical History:   Diagnosis Date    G6PD deficiency     Hypertension        PSHx:   Past Surgical History:   Procedure Laterality Date     SECTION      x2       All: Review of patient's allergies indicates:  No Known Allergies    Meds:   Prescriptions Prior to Admission   Medication Sig Dispense Refill Last Dose    ascorbic acid, vitamin C, (VITAMIN C) 100 MG tablet Take 100 mg by mouth once daily.   Not Taking    hydrocortisone (ANUSOL-HC) 2.5 % rectal cream Apply rectally 2 times daily 30 g 1 Taking    ibuprofen (ADVIL,MOTRIN) 600 MG tablet Take 1 tablet (600 mg total) by mouth 3 (three) times daily. 30 tablet 2 3/12/2017 at 1200    lisinopril 10 MG tablet Take 1 tablet (10 mg total) by mouth once daily. 30 tablet 11     nifedipine 30 MG ORAL TR24 (PROCARDIA-XL) 30 MG (OSM) 24 hr tablet Take 1 tablet (30 mg total) by mouth once daily. 30 tablet 11     oxycodone (ROXICODONE) 5 MG immediate release tablet Take 1 tablet (5 mg total) by mouth every 4 (four) hours as needed. 20 tablet 0 3/12/2017 at 1100    prenatal cmb#95-iron-FA-dha 28 mg iron-800 mcg-200 mg Cmpk Take by mouth.   Taking       SH:   Social History     Social History    Marital status: Single     Spouse name: N/A    Number of children: N/A    Years of education: N/A     Occupational History    Not on file.     Social  History Main Topics    Smoking status: Former Smoker    Smokeless tobacco: Never Used    Alcohol use No    Drug use: No    Sexual activity: Yes     Partners: Male     Birth control/ protection: None     Other Topics Concern    Not on file     Social History Narrative       FH:   Family History   Problem Relation Age of Onset    Breast cancer Paternal Grandmother     Ovarian cancer Paternal Grandmother     Cirrhosis Father     Colon cancer Neg Hx     Stroke Neg Hx     Hypertension Neg Hx     Diabetes Neg Hx        OBHx:   Obstetric History       T2      TAB0   SAB0   E0   M0   L3       # Outcome Date GA Lbr Michael/2nd Weight Sex Delivery Anes PTL Lv   3 Term 17 39w2d  3.74 kg (8 lb 3.9 oz) M CS-LTranv EPI N Y      Name: LINDSAY THOMAS      Apgar1:  9                Apgar5: 9   2  08 36w5d  3.358 kg (7 lb 6.5 oz) M CS-LTranv Spinal  Y      Name: Amador   1 Term 05 39w5d  3.941 kg (8 lb 11 oz)  CS-LTranv   Y      Name: Hayden      Complications: Failed induction,Pre-eclampsia          Objective:         Temp:  [97 °F (36.1 °C)-97.9 °F (36.6 °C)] 97.9 °F (36.6 °C)  Pulse:  [64-93] 93  SpO2:  [96 %-98 %] 98 %  BP: (129-179)/(79-99) 129/79    Gen: NAD, A&Ox3  Pulm: LCTAB  Card: RRR without clicks, rubs or murmurs  Abd: FHT present, soft, nondistended, uterus palpable below the umbilicus  Extremities: Palpable peripheral pulses, no pedal edema, 2+ DTRs x 4    Recent Results (from the past 24 hour(s))   CBC auto differential    Collection Time: 17  9:00 PM   Result Value Ref Range    WBC 11.33 3.90 - 12.70 K/uL    RBC 4.16 4.00 - 5.40 M/uL    Hemoglobin 11.1 (L) 12.0 - 16.0 g/dL    Hematocrit 35.6 (L) 37.0 - 48.5 %    MCV 86 82 - 98 fL    MCH 26.7 (L) 27.0 - 31.0 pg    MCHC 31.2 (L) 32.0 - 36.0 %    RDW 15.7 (H) 11.5 - 14.5 %    Platelets 377 (H) 150 - 350 K/uL    MPV 9.9 9.2 - 12.9 fL    Gran # 7.7 1.8 - 7.7 K/uL    Lymph # 2.4 1.0 - 4.8 K/uL    Mono # 0.6 0.3  - 1.0 K/uL    Eos # 0.5 0.0 - 0.5 K/uL    Baso # 0.03 0.00 - 0.20 K/uL    Gran% 68.2 38.0 - 73.0 %    Lymph% 21.3 18.0 - 48.0 %    Mono% 5.0 4.0 - 15.0 %    Eosinophil% 4.2 0.0 - 8.0 %    Basophil% 0.3 0.0 - 1.9 %    Differential Method Automated    Comprehensive metabolic panel    Collection Time: 17  9:00 PM   Result Value Ref Range    Sodium 141 136 - 145 mmol/L    Potassium 4.0 3.5 - 5.1 mmol/L    Chloride 110 95 - 110 mmol/L    CO2 26 23 - 29 mmol/L    Glucose 101 70 - 110 mg/dL    BUN, Bld 17 6 - 20 mg/dL    Creatinine 0.7 0.5 - 1.4 mg/dL    Calcium 9.0 8.7 - 10.5 mg/dL    Total Protein 6.6 6.0 - 8.4 g/dL    Albumin 3.0 (L) 3.5 - 5.2 g/dL    Total Bilirubin 0.1 0.1 - 1.0 mg/dL    Alkaline Phosphatase 109 55 - 135 U/L    AST 12 10 - 40 U/L    ALT 21 10 - 44 U/L    Anion Gap 5 (L) 8 - 16 mmol/L    eGFR if African American >60 >60 mL/min/1.73 m^2    eGFR if non African American >60 >60 mL/min/1.73 m^2       Assessment:       31 y.o.  female now s/p RLTCS/BTL on 3/6/17 whose postpartum course has been complicated by preeclampsia with severe features.   Plan:     1. Preeclampsia with SF. S/p MgSO4. Pressures inadequately controlled on home meds. Several severe range BP in LILLY did not respond to po procardia IR. No signs or symptoms otherwise. Labs as above.  - Lisinopril 10mg now  - Risks, benefits, alternatives and possible complications have been discussed in detail with the patient. Consents signed and to chart  - Admit to  [facility]  - Staff notified.    RICHARD Gonzalez MD/MPH  Obstetrics & Gynecology, PGY 4  [phone]

## 2017-03-19 NOTE — PROGRESS NOTES
[facility]  Maternal & Fetal Medicine  Progress Note    Patient Name: Zhanna Carlton  MRN: [id number]  Code Status: Full Code   Admission Date: 3/18/2017  Length of Stay: 0 days  Attending Physician: Merrai Mosquera MD  Primary Care Provider: Kody Fuller MD    Subjective:     HPI:   Zhanna Carlton is a 31 y.o.  female now s/p RLTCS/BTL on 3/6/17 whose postpartum course has been complicated by preeclampsia with severe features. She was discharged on Thursday after being admitted last  with new onset postpartum preE with SF. During that admission, she was treated with MgSO4 and BP was ultimately controlled with lisinopril and procardia. She was discharged on that regimen, but at home pressures were suboptimally controlled today and she returned to ED as instructed.  She denies HA, visual changes, SOB or RUQ pain.      Interval History: Pt reports mild HA without visual disturbance, SOB or RUQ pain. Otherwise feels well. No postoperative complaints - ambulating, voiding, tolerating po with regular bowel function. Minimal lochia. Pumping for , difficulty with latch after prolonged hospital course.     Review of patient's allergies indicates:  No Known Allergies    Objective:     Vital Signs (Most Recent):  Temp: 97.9 °F (36.6 °C) (17 2324)  Pulse: 76 (17 0654)  BP: 127/73 (17 0654)  SpO2: 99 % (17 0144) Vital Signs (24h Range):  Temp:  [97 °F (36.1 °C)-97.9 °F (36.6 °C)] 97.9 °F (36.6 °C)  Pulse:  [64-93] 76  SpO2:  [96 %-99 %] 99 %  BP: (125-179)/(73-99) 127/73       Intake/Output Summary (Last 24 hours) at 17 0821  Last data filed at 17 0600   Gross per 24 hour   Intake                0 ml   Output             1180 ml   Net            -1180 ml       Physical Exam:   Constitutional: She is oriented to person, place, and time. She appears well-developed and well-nourished. No distress.       Cardiovascular: Normal rate,  regular rhythm and normal heart sounds.     Pulmonary/Chest: Effort normal and breath sounds normal. No respiratory distress. She has no wheezes. She has no rales.        Abdominal: Soft. Bowel sounds are normal. She exhibits no distension. There is no tenderness. There is no rebound and no guarding.                 Neurological: She is alert and oriented to person, place, and time. She has normal reflexes.     Psychiatric: She has a normal mood and affect.     Significant Labs: All pertinant lab results within the past 24 hours have been reviewed.    Assessment/Plan:   39w2d weeks gestation presents for:    * Preeclampsia in postpartum period  BP: (125-179)/(73-99) 127/73. BP well controlled s/p lisinopril. No signs or symptoms of preeclampsia. Excellent UOP at 1180mL.  - Lisinopril 20 mg today  - Will add procardia with subsequent severe range pressures  - Anticipate D/C this afternoon if pressures remain normal/mild range    S/P repeat low transverse   - Routine postoperative care    Breastfeeding problem  - Lactation consult.       Tariq Gonzalez Iii, MD  Maternal & Fetal Medicine  [facility]

## 2017-03-19 NOTE — SUBJECTIVE & OBJECTIVE
Interval History: Pt reports mild HA without visual disturbance, SOB or RUQ pain. Otherwise feels well. No postoperative complaints - ambulating, voiding, tolerating po with regular bowel function. Minimal lochia. Pumping for , difficulty with latch after prolonged hospital course.     Review of patient's allergies indicates:  No Known Allergies    Objective:     Vital Signs (Most Recent):  Temp: 97.9 °F (36.6 °C) (17 2324)  Pulse: 76 (17 0654)  BP: 127/73 (17 0654)  SpO2: 99 % (17 0144) Vital Signs (24h Range):  Temp:  [97 °F (36.1 °C)-97.9 °F (36.6 °C)] 97.9 °F (36.6 °C)  Pulse:  [64-93] 76  SpO2:  [96 %-99 %] 99 %  BP: (125-179)/(73-99) 127/73       Intake/Output Summary (Last 24 hours) at 17 0821  Last data filed at 17 0600   Gross per 24 hour   Intake                0 ml   Output             1180 ml   Net            -1180 ml       Physical Exam:   Constitutional: She is oriented to person, place, and time. She appears well-developed and well-nourished. No distress.       Cardiovascular: Normal rate, regular rhythm and normal heart sounds.     Pulmonary/Chest: Effort normal and breath sounds normal. No respiratory distress. She has no wheezes. She has no rales.        Abdominal: Soft. Bowel sounds are normal. She exhibits no distension. There is no tenderness. There is no rebound and no guarding.                 Neurological: She is alert and oriented to person, place, and time. She has normal reflexes.     Psychiatric: She has a normal mood and affect.     Significant Labs: All pertinant lab results within the past 24 hours have been reviewed.

## 2017-03-19 NOTE — ED PROVIDER NOTES
Encounter Date: 3/18/2017       History     Chief Complaint   Patient presents with    Hypertension     Review of patient's allergies indicates:  No Known Allergies  HPI Comments: Zhanna Carlton is a 31 y.o. [id number] POD #12 s/p RLTCS/BTL presents complaining of elevated home blood pressures to systolic of 180. She has been taking Procardia and lisinopril as prescribed. She denies persistent HA, RUQ pain, vision changes. Lower extremity swelling that was present at last presentation is markedly decreased.    Patient was admitted for severe range BP and IV MgSO4 last week. She was discharged without complications.      Past Medical History:   Diagnosis Date    G6PD deficiency     Hypertension      Past Surgical History:   Procedure Laterality Date     SECTION      x2     Family History   Problem Relation Age of Onset    Breast cancer Paternal Grandmother     Ovarian cancer Paternal Grandmother     Cirrhosis Father     Colon cancer Neg Hx     Stroke Neg Hx     Hypertension Neg Hx     Diabetes Neg Hx      Social History   Substance Use Topics    Smoking status: Former Smoker    Smokeless tobacco: Never Used    Alcohol use No     Review of Systems   Eyes: Negative for photophobia and visual disturbance.   Respiratory: Negative for shortness of breath.    Cardiovascular: Negative for chest pain and leg swelling.   Gastrointestinal: Negative for abdominal pain.   Neurological: Negative for headaches.       Physical Exam   Initial Vitals   BP Pulse Resp Temp SpO2   17 -- 17   179/98 74  97.7 °F (36.5 °C) 97 %     BP series: 179/98, 175/99, 173/99    Physical Exam    Constitutional: She appears well-developed and well-nourished. She is not diaphoretic. No distress.   HENT:   Head: Normocephalic and atraumatic.   Right Ear: External ear normal.   Left Ear: External ear normal.   Eyes: EOM are normal.   Cardiovascular: Normal rate and regular rhythm.    Pulmonary/Chest: Breath sounds normal. No respiratory distress. She has no rhonchi.   Abdominal: Soft. She exhibits no distension. There is no tenderness. There is no rebound.   Musculoskeletal: Normal range of motion. She exhibits no edema or tenderness.   Neurological: She is alert and oriented to person, place, and time. She has normal strength and normal reflexes.   Skin: Skin is warm and dry.   Psychiatric: She has a normal mood and affect. Her behavior is normal. Judgment and thought content normal.         ED Course   Procedures  Labs Reviewed - No data to display          Medical Decision Making:   Initial Assessment:   31 y.o. [id number] at 39w2d presents complaining of elevated home blood pressures to systolic of 180  Differential Diagnosis:   Postpartum preeclampsia with severe features  ED Management:  Patient with no cerebral symptoms of pre-eclampsia. BP series: 179/98, 175/99, 173/99. Patient given 3 doses of Procardia 10. Did not respond and remained severe range.    Decision was made to admit patient for observation and attempt to control blood pressures. Patient remained asymptomatic while in the ED.    Will order lisinopril 10 mg now per recs of on call staff and send to postpartum unit for observation.    Inder Westno MD  PGY-1 OB/GYN  [phone]    Discussed plan with Dr. Ordoñez who was in agreement.                      ED Course     Clinical Impression:   The encounter diagnosis was Preeclampsia in postpartum period.          Aron Weston MD  Resident  [phone]

## 2017-03-19 NOTE — LACTATION NOTE
Lactation note- Assessed patient for current lactation needs. Patient is pumping with P and S from home. [employer]. Patient declines at this time. Patient has breast milk storage bottle and does not verbalaize further needs at this time.

## 2017-03-19 NOTE — DISCHARGE SUMMARY
Discharge Summary  Gynecology      Admit Date: 3/18/2017    Discharge Date and Time: 3/19/2017     Attending Physician: Merari Mosquera MD    Principal Diagnoses: Preeclampsia in postpartum period    Active Hospital Problems    Diagnosis  POA    *Preeclampsia in postpartum period [O14.95]  Yes    Breastfeeding problem [Z91.89]  Yes    S/P repeat low transverse  [Z98.891]  Not Applicable      Resolved Hospital Problems    Diagnosis Date Resolved POA   No resolved problems to display.       Procedures: * No surgery found *    Discharged Condition: good    Hospital Course:    Zhanna Carlton is a 31 y.o. CF who was admitted on 3/18/2017 (POD#12 s/p RLTCS) for postpartum pre-eclampsia with inadequate BP control as an outpatient. See H&P for full details of her presentation. BP regimen was changed to lisinopril 20mg daily. Addition of procardia 10mg TID PRN for intermittent severe range BPs. This regimen controlled her BP.  She denied any further HA, visual changes, SOB, CP, N/V or RUQ pain. She was discharged home on the above regimen to f/u in one week for a BP check.     Consults: None    Significant Diagnostic Studies:    Recent Labs  Lab 17  2100   WBC 11.33   HGB 11.1*   HCT 35.6*   MCV 86   *        Treatments:   1. BP meds as above     Disposition: Home or Self Care    Patient Instructions:   Current Discharge Medication List      START taking these medications    Details   nifedipine (PROCARDIA) 10 MG Cap Take 1 capsule (10 mg total) by mouth every 8 (eight) hours as needed (as needed for BP checked at home systolic > 160/ Diastolic > 110).  Qty: 60 capsule, Refills: 3         CONTINUE these medications which have CHANGED    Details   lisinopril (PRINIVIL,ZESTRIL) 20 MG tablet Take 1 tablet (20 mg total) by mouth once daily.  Qty: 60 tablet, Refills: 3         CONTINUE these medications which have NOT CHANGED    Details   ascorbic acid, vitamin C, (VITAMIN C) 100 MG tablet Take  100 mg by mouth once daily.      hydrocortisone (ANUSOL-HC) 2.5 % rectal cream Apply rectally 2 times daily  Qty: 30 g, Refills: 1    Associated Diagnoses: Hemorrhoids during pregnancy in third trimester      ibuprofen (ADVIL,MOTRIN) 600 MG tablet Take 1 tablet (600 mg total) by mouth 3 (three) times daily.  Qty: 30 tablet, Refills: 2    Associated Diagnoses: Obesity affecting pregnancy in third trimester; S/P repeat low transverse ; Previous  section complicating pregnancy; Diet controlled gestational diabetes mellitus (GDM) in third trimester; History of pre-eclampsia in prior pregnancy, currently pregnant in third trimester      oxycodone (ROXICODONE) 5 MG immediate release tablet Take 1 tablet (5 mg total) by mouth every 4 (four) hours as needed.  Qty: 20 tablet, Refills: 0    Associated Diagnoses: Obesity affecting pregnancy in third trimester; S/P repeat low transverse ; Previous  section complicating pregnancy; Diet controlled gestational diabetes mellitus (GDM) in third trimester; History of pre-eclampsia in prior pregnancy, currently pregnant in third trimester      prenatal cmb#95-iron-FA-dha 28 mg iron-800 mcg-200 mg Cmpk Take by mouth.         STOP taking these medications       nifedipine 30 MG ORAL TR24 (PROCARDIA-XL) 30 MG (OSM) 24 hr tablet Comments:   Reason for Stopping:                 Discharge Procedure Orders  Diet general     Activity as tolerated     Other restrictions (specify):   Order Comments: Nothing in vagina for 6 weeks.     Call MD for:   Order Comments: Heavy vaginal bleeding saturating greater than or equal to 1 pad per hour     Call MD for:  increased confusion or weakness     Call MD for:  persistent dizziness, light-headedness, or visual disturbances     Call MD for:  worsening rash     Call MD for:  severe persistent headache     Call MD for:  difficulty breathing or increased cough     Call MD for:  redness, tenderness, or signs of infection  (pain, swelling, redness, odor or green/yellow discharge around incision site)     Call MD for:  severe uncontrolled pain     Call MD for:  persistent nausea and vomiting or diarrhea     Call MD for:  temperature >100.4     No dressing needed   Scheduling Instructions: Please keep incision clean and dry. Wash daily with soap and water. Allow to air dry completely.         Follow-up Information     Follow up with Merari Mosquera MD. Schedule an appointment as soon as possible for a visit in 1 week.    Specialties:  Obstetrics, Obstetrics and Gynecology    Why:  BP CHECK IN ONE WEEK     Contact information:    [address] [address]  [phone]

## 2017-03-20 ENCOUNTER — PATIENT MESSAGE (OUTPATIENT)
Dept: OBSTETRICS AND GYNECOLOGY | Facility: CLINIC | Age: 32
End: 2017-03-20

## 2017-03-20 NOTE — TELEPHONE ENCOUNTER
Discussed b/p criteria of concern with patient , as well as signs and symptoms of worsening B/P/Pre Eclampsia.

## 2017-03-23 ENCOUNTER — OFFICE VISIT (OUTPATIENT)
Dept: OBSTETRICS AND GYNECOLOGY | Facility: CLINIC | Age: 32
End: 2017-03-23
Attending: OBSTETRICS & GYNECOLOGY
Payer: MEDICAID

## 2017-03-23 VITALS
HEIGHT: 65 IN | WEIGHT: 230.81 LBS | BODY MASS INDEX: 38.45 KG/M2 | DIASTOLIC BLOOD PRESSURE: 88 MMHG | SYSTOLIC BLOOD PRESSURE: 144 MMHG

## 2017-03-23 PROCEDURE — 99212 OFFICE O/P EST SF 10 MIN: CPT | Mod: PBBFAC | Performed by: OBSTETRICS & GYNECOLOGY

## 2017-03-23 PROCEDURE — 99999 PR PBB SHADOW E&M-EST. PATIENT-LVL II: CPT | Mod: PBBFAC,,, | Performed by: OBSTETRICS & GYNECOLOGY

## 2017-03-23 PROCEDURE — 99024 POSTOP FOLLOW-UP VISIT: CPT | Mod: ,,, | Performed by: OBSTETRICS & GYNECOLOGY

## 2017-03-23 RX ORDER — LISINOPRIL 20 MG/1
20 TABLET ORAL DAILY
Qty: 30 TABLET | Refills: 3 | Status: SHIPPED | OUTPATIENT
Start: 2017-03-23 | End: 2017-04-18 | Stop reason: DRUGHIGH

## 2017-03-23 RX ORDER — AMOXICILLIN AND CLAVULANATE POTASSIUM 875; 125 MG/1; MG/1
1 TABLET, FILM COATED ORAL 2 TIMES DAILY
Qty: 10 TABLET | Refills: 0 | Status: SHIPPED | OUTPATIENT
Start: 2017-03-23 | End: 2017-03-30

## 2017-03-23 NOTE — PROGRESS NOTES
"POST PARTUM INTERIM CHECK    Date: 2017       26 year old female patient   Presents today 2 weeks following a Repeat  Delivery with BTL. The delivery and hospitalization were complicated by Postpartum Pre Eclampsia, requiring re admit for Mg SO4 and B/P control  CUrrently taking Lisinopril 20mg daily with Procardia 10mg prn (has required one dose daily) ..    Delivery Date:  Delivery MD:Merari Mosquera  Baby's name: John Ortiz        PE: BP (!) 144/88  Ht 5' 5" (1.651 m)  Wt 104.7 kg (230 lb 13.2 oz)  LMP 2016 (Exact Date)  BMI 38.41 kg/m2  Incision healing well with no induration or drainage.    DIAGNOSIS:  Interim post partum follow up for incision / B/P check.    1. Pre-eclampsia, postpartum  lisinopril (PRINIVIL,ZESTRIL) 20 MG tablet    and PLAN:    PLAN: Return in 3 weeks.   Advised to see PCP    MEDICATIONS PRESCRIBED: Lisinopril      Return in about 4 weeks (around 2017).    "

## 2017-03-28 ENCOUNTER — TELEPHONE (OUTPATIENT)
Dept: OBSTETRICS AND GYNECOLOGY | Facility: CLINIC | Age: 32
End: 2017-03-28

## 2017-04-01 ENCOUNTER — TELEPHONE (OUTPATIENT)
Dept: OBSTETRICS AND GYNECOLOGY | Facility: HOSPITAL | Age: 32
End: 2017-04-01

## 2017-04-01 NOTE — TELEPHONE ENCOUNTER
"Spoke with patient over the phone. Patient is concerned regarding incision. She states her mother noticed a "blister-like" lesion on inferior aspect of incision. She states the incision is intact, denies bleeding or drainage. She also denies nausea, vomiting, fever, or chills. Instructed patient to keep incision dry, keep maxi pad over incision.  Counseled patient if she notices the incision is bleeding, or starts draining pus, or she begins to have N/V/F/C she should come to [facility] for evaluation. Otherwise, instructed patient to call clinic early next week in order to be seen in clinic as outpatient. Patient voiced understanding.     Flor Britton MD  Ob/Gyn PGY-1      "

## 2017-04-02 ENCOUNTER — PATIENT MESSAGE (OUTPATIENT)
Dept: OBSTETRICS AND GYNECOLOGY | Facility: CLINIC | Age: 32
End: 2017-04-02

## 2017-04-03 ENCOUNTER — TELEPHONE (OUTPATIENT)
Dept: OBSTETRICS AND GYNECOLOGY | Facility: CLINIC | Age: 32
End: 2017-04-03

## 2017-04-03 ENCOUNTER — OFFICE VISIT (OUTPATIENT)
Dept: OBSTETRICS AND GYNECOLOGY | Facility: CLINIC | Age: 32
End: 2017-04-03
Attending: OBSTETRICS & GYNECOLOGY
Payer: MEDICAID

## 2017-04-03 VITALS
BODY MASS INDEX: 39.05 KG/M2 | HEIGHT: 65 IN | WEIGHT: 234.38 LBS | SYSTOLIC BLOOD PRESSURE: 142 MMHG | DIASTOLIC BLOOD PRESSURE: 94 MMHG

## 2017-04-03 PROCEDURE — 99999 PR PBB SHADOW E&M-EST. PATIENT-LVL II: CPT | Mod: PBBFAC,,, | Performed by: OBSTETRICS & GYNECOLOGY

## 2017-04-03 PROCEDURE — 99024 POSTOP FOLLOW-UP VISIT: CPT | Mod: ,,, | Performed by: OBSTETRICS & GYNECOLOGY

## 2017-04-03 PROCEDURE — 99212 OFFICE O/P EST SF 10 MIN: CPT | Mod: PBBFAC | Performed by: OBSTETRICS & GYNECOLOGY

## 2017-04-03 NOTE — TELEPHONE ENCOUNTER
----- Message from Maria R Boland sent at 4/3/2017  9:13 AM CDT -----  Contact: pt   X_  1st Request  _  2nd Request  _  3rd Request        Who: PAOLO THOMAS [[id number]]    Why: pt is calling in regards to a  she had on 3/5/17 pt states her incision came open and would like to be seen today     What Number to Call Back: [phone]    When to Expect a call back: (Before the end of the day)   -- if the call is after 12:00, the call back will be tomorrow.

## 2017-04-03 NOTE — PROGRESS NOTES
"POST PARTUM INTERIM CHECK    Date: 2017       31year old female patient   Presents today 4 weeks following a Repeat  Delivery . The delivery and hospitalization were complicated by PP Pre Eclampsia     Delivery Date:  Delivery MD:Merari Mosquera          PE: BP (!) 142/94  Ht 5' 5" (1.651 m)  Wt 106.3 kg (234 lb 5.6 oz)  LMP 2016 (Exact Date)  BMI 39 kg/m2  Incision well healed with one small area of granulation/suture granulation near center of incision below incision line. No induration, erythema or purulence.    DIAGNOSIS:  Interim post partum follow up for incision / B/P check.    1. Postoperative complication of  section      and PLAN:    PLAN:  Lisinopril 20 daily with Procardia 10 prn as backup for elevated B/P    MEDICATIONS PRESCRIBED: PNV.      Return in about 2 weeks (around 2017).    "

## 2017-04-03 NOTE — TELEPHONE ENCOUNTER
Scheduled appt for pt to be seen today regarding her incision opening. Offered pt 10:30 AM appt, pt refused and accepted an afternoon appt.

## 2017-04-18 ENCOUNTER — POSTPARTUM VISIT (OUTPATIENT)
Dept: OBSTETRICS AND GYNECOLOGY | Facility: CLINIC | Age: 32
End: 2017-04-18
Attending: OBSTETRICS & GYNECOLOGY
Payer: MEDICAID

## 2017-04-18 VITALS
WEIGHT: 226.19 LBS | BODY MASS INDEX: 37.69 KG/M2 | DIASTOLIC BLOOD PRESSURE: 86 MMHG | HEIGHT: 65 IN | SYSTOLIC BLOOD PRESSURE: 134 MMHG

## 2017-04-18 DIAGNOSIS — O16.9 HYPERTENSION DURING PREGNANCY, ANTEPARTUM, UNSPECIFIED HYPERTENSION IN PREGNANCY TYPE: Primary | ICD-10-CM

## 2017-04-18 PROBLEM — Z91.89 BREASTFEEDING PROBLEM: Status: RESOLVED | Noted: 2017-03-19 | Resolved: 2017-04-18

## 2017-04-18 PROBLEM — Z98.891 S/P REPEAT LOW TRANSVERSE C-SECTION: Status: RESOLVED | Noted: 2017-03-06 | Resolved: 2017-04-18

## 2017-04-18 PROCEDURE — 99212 OFFICE O/P EST SF 10 MIN: CPT | Mod: PBBFAC,25 | Performed by: OBSTETRICS & GYNECOLOGY

## 2017-04-18 PROCEDURE — 99999 PR PBB SHADOW E&M-EST. PATIENT-LVL II: CPT | Mod: PBBFAC,,, | Performed by: OBSTETRICS & GYNECOLOGY

## 2017-04-18 RX ORDER — LISINOPRIL 10 MG/1
10 TABLET ORAL DAILY
Qty: 30 TABLET | Refills: 11 | Status: SHIPPED | OUTPATIENT
Start: 2017-04-18 | End: 2018-02-15

## 2017-05-24 ENCOUNTER — TELEPHONE (OUTPATIENT)
Dept: OBSTETRICS AND GYNECOLOGY | Facility: CLINIC | Age: 32
End: 2017-05-24

## 2017-05-24 NOTE — TELEPHONE ENCOUNTER
Patient has inflamed hair follicle in incision site, advised to clean with peroxide and use Neosporin ointment.

## 2017-05-24 NOTE — TELEPHONE ENCOUNTER
----- Message from Nicki Nation sent at 2017 12:32 PM CDT -----  Contact: PAOLO THOMAS [[id number]]  _x  1st Request  _  2nd Request  _  3rd Request        Who: PAOLO THOMAS [[id number]]    Why: pt states she is 11 weeks postpartum and  incision is bleeding. Please call, Thanks!    What Number to Call Back:[phone]    When to Expect a call back: (Before the end of the day)   -- if the call is after 12:00, the call back will be tomorrow.

## 2017-06-09 NOTE — ASSESSMENT & PLAN NOTE
BP: (125-179)/(73-99) 127/73. BP well controlled s/p lisinopril. No signs or symptoms of preeclampsia. Excellent UOP at 1180mL.  - Lisinopril 20 mg today  - Will add procardia with subsequent severe range pressures  - Anticipate D/C this afternoon if pressures remain normal/mild range   normal saline

## 2018-01-18 ENCOUNTER — CLINICAL SUPPORT (OUTPATIENT)
Dept: OCCUPATIONAL MEDICINE | Facility: CLINIC | Age: 33
End: 2018-01-18

## 2018-01-18 DIAGNOSIS — Z11.1 VISIT FOR TB SKIN TEST: Primary | ICD-10-CM

## 2018-01-18 PROCEDURE — 86580 TB INTRADERMAL TEST: CPT | Mod: S$GLB,,,

## 2018-01-18 NOTE — PROGRESS NOTES
PPD Placement note  Zhanna Carlton, 32 y.o. female is here today for placement of PPD test  Reason for PPD test: Work  Pt taken PPD test before: yes  Verified in allergy area and with patient that they are not allergic to the products PPD is made of (Phenol or Tween). Yes  Is patient taking any oral or IV steroid medication now or have they taken it in the last month? no  Has the patient ever received the BCG vaccine?: no  Has the patient been in recent contact with anyone known or suspected of having active TB disease?: no       Date of exposure (if applicable): N/A       Name of person they were exposed to (if applicable): N/A  Patient's Country of origin?: [place]  O: Alert and oriented in NAD.  P:  PPD placed on 1/18/2018.  Patient advised to return for reading within 48-72 hours.

## 2018-01-20 ENCOUNTER — CLINICAL SUPPORT (OUTPATIENT)
Dept: URGENT CARE | Facility: CLINIC | Age: 33
End: 2018-01-20
Payer: MEDICAID

## 2018-01-20 DIAGNOSIS — Z11.1 ENCOUNTER FOR PPD SKIN TEST READING: Primary | ICD-10-CM

## 2018-01-20 LAB
TB INDURATION - 48 HR READ: 0 MM
TB INDURATION - 72 HR READ: ABNORMAL MM
TB SKIN TEST - 48 HR READ: NEGATIVE
TB SKIN TEST - 72 HR READ: ABNORMAL

## 2018-01-20 NOTE — PROGRESS NOTES
PPD Reading Note  PPD read and results entered in [employer].  Result: 0 mm induration.  Interpretation: Negative  If test not read within 48-72 hours of initial placement, patient advised to repeat in other arm 1-3 weeks after this test.  Alelrgic reaction: no  TB read by Dr. Broderick Harper

## 2018-02-15 ENCOUNTER — OFFICE VISIT (OUTPATIENT)
Dept: URGENT CARE | Facility: CLINIC | Age: 33
End: 2018-02-15
Payer: MEDICAID

## 2018-02-15 VITALS
HEIGHT: 65 IN | TEMPERATURE: 99 F | HEART RATE: 86 BPM | DIASTOLIC BLOOD PRESSURE: 86 MMHG | OXYGEN SATURATION: 97 % | WEIGHT: 230 LBS | RESPIRATION RATE: 12 BRPM | SYSTOLIC BLOOD PRESSURE: 125 MMHG | BODY MASS INDEX: 38.32 KG/M2

## 2018-02-15 DIAGNOSIS — H66.002 ACUTE SUPPURATIVE OTITIS MEDIA OF LEFT EAR WITHOUT SPONTANEOUS RUPTURE OF TYMPANIC MEMBRANE, RECURRENCE NOT SPECIFIED: Primary | ICD-10-CM

## 2018-02-15 PROCEDURE — 99214 OFFICE O/P EST MOD 30 MIN: CPT | Mod: S$GLB,,, | Performed by: NURSE PRACTITIONER

## 2018-02-15 PROCEDURE — 3008F BODY MASS INDEX DOCD: CPT | Mod: S$GLB,,, | Performed by: NURSE PRACTITIONER

## 2018-02-15 RX ORDER — DEXAMETHASONE SODIUM PHOSPHATE 100 MG/10ML
10 INJECTION INTRAMUSCULAR; INTRAVENOUS
Status: COMPLETED | OUTPATIENT
Start: 2018-02-15 | End: 2018-02-15

## 2018-02-15 RX ORDER — AMOXICILLIN AND CLAVULANATE POTASSIUM 875; 125 MG/1; MG/1
1 TABLET, FILM COATED ORAL 2 TIMES DAILY
Qty: 20 TABLET | Refills: 0 | Status: SHIPPED | OUTPATIENT
Start: 2018-02-15 | End: 2018-02-25

## 2018-02-15 RX ADMIN — DEXAMETHASONE SODIUM PHOSPHATE 10 MG: 100 INJECTION INTRAMUSCULAR; INTRAVENOUS at 09:02

## 2018-02-15 NOTE — PATIENT INSTRUCTIONS
Please drink plenty of fluids.  Please get plenty of rest.    Please return here or go to the Emergency Department for any concerns or worsening of condition.    If you were prescribed antibiotics, please take them to completion.    If you do not have Hypertension or any history of palpitations, it is ok to take over the counter Sudafed or Mucinex D as directed on bottle with 2 glasses of water.    If you do have Hypertension or palpitations, it is safe to take Coricidin HBP or Mucinex DM for relief of congestion and cough. Take as directed on bottle with at least 2 glasses of water.    If not allergic, please take over the counter Tylenol (Acetaminophen) and/or Motrin (Ibuprofen) as directed on bottle for control of pain and/or fever.    Please follow up with your primary care doctor or specialist as needed.    If you  smoke, please stop smoking.    When to Use Antibiotics   Antibiotics are medicines used to treat infections caused by bacteria. They dont work for illnesses caused by viruses or an allergic reaction. In fact, taking antibiotics for reasons other than a bacterial infection can cause problems. For example, you may have side effects from the medicine. And if you really need an antibiotic, it may not work well.                                                                                                                                              When antibiotics wont help  Your healthcare provider wont usually prescribe antibiotics for the following conditions. You can help by not asking for them if you have:   · A cold. This type of illness is caused by a virus. It can cause a runny nose, stuffed-up nose, sneezing, coughing, headache, mild body aches, and low fever. A cold gets better on its own in a few days to a week.  · The flu (influenza). This is a respiratory illness caused by a virus. The flu usually goes away on its own in a week or so. It can cause fever, body aches, sore throat, and  fatigue.  · Bronchitis. This is an infection in the lungs most often caused by a virus. You may have coughing, phlegm, body aches, and a low fever. A common type of bronchitis is known as a chest cold (acute bronchitis). This often happens after you have a respiratory infection like a common cold. Bronchitis can take weeks to go away, but antibiotics usually dont help.  · Most sore throats. Sore throats are most often caused by viruses. Your throat may feel scratchy or achy, and it may hurt to swallow. You may also have a low fever and body aches. A sore throat usually gets better in a few days.  · Most ear infections. An ear infection may be caused by a virus or bacteria. It causes pain in the ear. Antibiotics usually dont help, and the infection goes away on its own.  · Most sinus infections (sinusitis). This kind of infection causes sinus pain and swelling, and a runny nose. In most cases, sinusitis goes away on its own, and antibiotics dont make recovery quicker.  · Allergic rhinitis. This is a set of symptoms caused by an allergic reaction. You may have sneezing, a runny nose, itchy or watery eyes, or a sore throat. Allergies are not treated with antibiotics.  · Low fever. A mild fever thats less than 100.4°F (38°C) most likely doesnt need treatment with antibiotics.   When antibiotics can help   Antibiotics can be used to treat:                                                     · Strep throat. This is a throat infectioncaused by a certain type of bacteria. Symptoms of strep throat include a sore throat, white patches on the tonsils, red spots on the roof of the mouth, fever, body aches, and nausea and vomiting.  · Urinary tract infection (UTI). This is a bacterial infection of the bladder and the tube that takes urine out of the body. It can cause burning pain and urine thats cloudy or tinted with blood. UTIs are very common. Antibiotics usually help treat these infections.  · Some ear infections. In  some cases, a healthcare provider may prescribe antibiotics for an ear infection. You may need a test to show whats causing the ear infection.  · Some sinus infections. In some cases, yourhealthcare provider may give you antibiotics. He or she may first need to make sure your symptoms arent caused by a virus, fungus, allergies, or air pollutants such as smoke.   Your doctor may also recommend antibiotics if you have a condition that can affect your immune system, such as diabetes or cancer.   Self-care at home   If your infection cant be treated with antibiotics, you can take other steps to feel better. Try the remedies below. In general:   · Rest and sleep as much as needed.  · Drink water and other clear fluids.  · Dont smoke, and avoid smoke from other people.  · Use over-the-counter medicine such as acetaminophen to ease pain or fever, as directed by your healthcare provider.   To treat sinus pain or nasal congestion:   · Put a warm, moist washcloth on your face where you feel sinus pain or pressure.  · Use a nasal spray with medicine or saline, as directed by your healthcare provider.  · Breathe in steam from a hot shower.  · Use a humidifier or cool mist vaporizer.   To quiet a cough:   · Use a humidifier or cool mist vaporizer.  · Breathe in steam from a hot shower.  · Use cough lozenges.   To sooth a sore throat:   · Suck on ice chips, popsicles, or lozenges.  · Use a sore throat spray.  · Use a humidifier or cool mist vaporizer.  · Gargle with saltwater.  · Drink warm liquids.   To ease ear pain:   · Hold a warm, moist washcloth on the ear for 10 minutes at a time.  Date Last Reviewed: 9/1/2016  © 9413-1474 [employer]. [address] [place] [address], [place] [address]. All rights reserved. This information is not intended as a substitute for professional medical care. Always follow your healthcare professional's instructions.        Anatomy of the Ear    The ear is a complex and delicate organ.  It collects sound waves so you can hear the world around you. The ear also has a second function--it helps you keep your balance. Your ear can be divided into 3 parts. The outer ear and middle ear help collect and amplify sound. The inner ear converts sound waves to messages that are sent to the brain. The inner ear also senses the movement and position of your head and body so you can maintain your balance and see clearly, even when you change positions.  The mastoid bone surrounds the middle ear. The external ear collects sound waves. The ear canal carries sound waves to the eardrum. The eardrum vibrates from sound waves, setting the middle ear bones in motion. The middle ear bones (ossicles) vibrate, transmitting sound waves to the inner ear. When the ear is healthy, air pressure remains balanced in the middle ear. The eustachian tube helps control air pressure in the middle ear. The semicircular canals help maintain balance. The vestibular nerve carries balance signals to the brain. The auditory nerve carries sound signals to the brain. The cochlea picks up sound waves and makes nerve signals.     Date Last Reviewed: 10/1/2016  © 8537-3460 [employer]. [address] [place] [address], [place] [address]. All rights reserved. This information is not intended as a substitute for professional medical care. Always follow your healthcare professional's instructions.

## 2018-02-15 NOTE — PROGRESS NOTES
"Subjective:       Patient ID: Zhanna Carlton is a 32 y.o. female.    Vitals:  height is 5' 5" (1.651 m) and weight is 104.3 kg (230 lb). Her oral temperature is 98.7 °F (37.1 °C). Her blood pressure is 125/86 and her pulse is 86. Her respiration is 12 and oxygen saturation is 97%.     Chief Complaint: Tinnitus    Pt has had a cough and congestion on and off for about 2 weeks.  Pt reports the congestion is better and she is not having sinus pressure at this time, but her left ear is hurting and she feels like she has fluid in her ears.      Ringing in Ears:   Chronicity:  New  Onset:  In the past 7 days (2 days ago)  Progression since onset:  Unchanged  Frequency:  Every few minutes  Pain scale:  4/10  Severity:  Mild  Duration:  Off/on all day  Ringing in ear characteristics:  Muffled   Associated symptoms: ear congestion, ear pain, headaches, tinnitus and otalgia.  No dizziness, no vertigo, no fever and no rhinorrhea.  Aggravated by:  Lying down (Leaning forward )  Treatments tried: Ear Drops.  Improvement on treatment:  No reliefNo dizziness, no ear surgery and no ear tubes.    Review of Systems   Constitution: Negative for chills, fever and malaise/fatigue.   HENT: Positive for ear pain and tinnitus. Negative for congestion, hoarse voice, rhinorrhea and sore throat.    Eyes: Negative for discharge and redness.   Cardiovascular: Negative for chest pain, dyspnea on exertion and leg swelling.   Respiratory: Positive for cough. Negative for shortness of breath, sputum production and wheezing.    Musculoskeletal: Negative for myalgias.   Gastrointestinal: Negative for abdominal pain and nausea.   Neurological: Positive for headaches. Negative for dizziness and vertigo.   All other systems reviewed and are negative.      Objective:      Physical Exam   Constitutional: She is oriented to person, place, and time. Vital signs are normal. She appears well-developed and well-nourished. She is cooperative.  Non-toxic " appearance. She does not have a sickly appearance. She appears ill. No distress.   HENT:   Head: Normocephalic and atraumatic.   Right Ear: Hearing, external ear and ear canal normal. A middle ear effusion is present.   Left Ear: Hearing, external ear and ear canal normal. Tympanic membrane is erythematous. A middle ear effusion is present.   Nose: Nose normal. No mucosal edema or rhinorrhea. Right sinus exhibits no maxillary sinus tenderness and no frontal sinus tenderness. Left sinus exhibits no maxillary sinus tenderness and no frontal sinus tenderness.   Mouth/Throat: Uvula is midline and mucous membranes are normal. Posterior oropharyngeal erythema present.   Eyes: Conjunctivae and lids are normal.   Neck: Normal range of motion and full passive range of motion without pain. Neck supple. No neck rigidity. No edema, no erythema and normal range of motion present.   Cardiovascular: Normal rate, regular rhythm and normal heart sounds.    Pulmonary/Chest: Effort normal and breath sounds normal. No accessory muscle usage. No apnea, no tachypnea and no bradypnea. No respiratory distress. She has no decreased breath sounds. She has no wheezes. She has no rhonchi. She has no rales.   Abdominal: Normal appearance.   Lymphadenopathy:        Head (right side): No submental, no submandibular and no tonsillar adenopathy present.        Head (left side): No submental, no submandibular and no tonsillar adenopathy present.     She has no cervical adenopathy.   Neurological: She is alert and oriented to person, place, and time.   Psychiatric: She has a normal mood and affect. Her speech is normal and behavior is normal.   Nursing note and vitals reviewed.      Assessment:       1. Acute suppurative otitis media of left ear without spontaneous rupture of tympanic membrane, recurrence not specified        Plan:         Acute suppurative otitis media of left ear without spontaneous rupture of tympanic membrane, recurrence not  specified  -     amoxicillin-clavulanate 875-125mg (AUGMENTIN) 875-125 mg per tablet; Take 1 tablet by mouth 2 (two) times daily.  Dispense: 20 tablet; Refill: 0  -     dexamethasone injection 10 mg; Inject 1 mL (10 mg total) into the muscle one time.      Instructed pt to take mucinex and increase fluid intake.  Instructed her to follow up with pcp for no improvement in 7-10 days.

## 2019-01-02 ENCOUNTER — OFFICE VISIT (OUTPATIENT)
Dept: URGENT CARE | Facility: CLINIC | Age: 34
End: 2019-01-02
Payer: COMMERCIAL

## 2019-01-02 VITALS
RESPIRATION RATE: 16 BRPM | DIASTOLIC BLOOD PRESSURE: 92 MMHG | OXYGEN SATURATION: 99 % | SYSTOLIC BLOOD PRESSURE: 151 MMHG | WEIGHT: 230 LBS | HEIGHT: 65 IN | TEMPERATURE: 98 F | HEART RATE: 96 BPM | BODY MASS INDEX: 38.32 KG/M2

## 2019-01-02 DIAGNOSIS — R05.9 COUGH: ICD-10-CM

## 2019-01-02 DIAGNOSIS — J01.00 ACUTE NON-RECURRENT MAXILLARY SINUSITIS: Primary | ICD-10-CM

## 2019-01-02 PROCEDURE — 96372 THER/PROPH/DIAG INJ SC/IM: CPT | Mod: S$GLB,,, | Performed by: SURGERY

## 2019-01-02 PROCEDURE — 96372 PR INJECTION,THERAP/PROPH/DIAG2ST, IM OR SUBCUT: ICD-10-PCS | Mod: S$GLB,,, | Performed by: SURGERY

## 2019-01-02 RX ORDER — CODEINE PHOSPHATE AND GUAIFENESIN 10; 100 MG/5ML; MG/5ML
10 SOLUTION ORAL EVERY 6 HOURS PRN
Qty: 118 ML | Refills: 0 | Status: SHIPPED | OUTPATIENT
Start: 2019-01-02 | End: 2019-01-09

## 2019-01-02 RX ORDER — ALBUTEROL SULFATE 90 UG/1
2 AEROSOL, METERED RESPIRATORY (INHALATION) EVERY 6 HOURS PRN
Qty: 18 G | Refills: 0 | Status: SHIPPED | OUTPATIENT
Start: 2019-01-02 | End: 2019-01-28

## 2019-01-02 RX ORDER — DOXYCYCLINE 100 MG/1
100 CAPSULE ORAL 2 TIMES DAILY
Qty: 14 CAPSULE | Refills: 0 | Status: SHIPPED | OUTPATIENT
Start: 2019-01-02 | End: 2019-01-09

## 2019-01-02 RX ORDER — DEXAMETHASONE SODIUM PHOSPHATE 100 MG/10ML
5 INJECTION INTRAMUSCULAR; INTRAVENOUS ONCE
Status: COMPLETED | OUTPATIENT
Start: 2019-01-02 | End: 2019-01-02

## 2019-01-02 RX ORDER — FLUTICASONE PROPIONATE 50 MCG
2 SPRAY, SUSPENSION (ML) NASAL DAILY
Qty: 1 BOTTLE | Refills: 0 | Status: SHIPPED | OUTPATIENT
Start: 2019-01-02 | End: 2019-01-28

## 2019-01-02 RX ORDER — PREDNISONE 20 MG/1
40 TABLET ORAL DAILY
Qty: 10 TABLET | Refills: 0 | Status: SHIPPED | OUTPATIENT
Start: 2019-01-03 | End: 2019-01-08

## 2019-01-02 RX ADMIN — DEXAMETHASONE SODIUM PHOSPHATE 5 MG: 100 INJECTION INTRAMUSCULAR; INTRAVENOUS at 10:01

## 2019-01-02 NOTE — PROGRESS NOTES
"Subjective:       Patient ID: Zhanna Carlton is a 33 y.o. female.    Vitals:  height is 5' 5" (1.651 m) and weight is 104.3 kg (230 lb). Her temperature is 98 °F (36.7 °C). Her blood pressure is 151/92 (abnormal) and her pulse is 96. Her respiration is 16 and oxygen saturation is 99%.     Chief Complaint: Cough    Sx x 4d      Cough   This is a new problem. The current episode started in the past 7 days. The problem has been unchanged. The problem occurs constantly. The cough is non-productive. Pertinent negatives include no chills, ear pain, eye redness, fever, hemoptysis, myalgias, rash, sore throat, shortness of breath or wheezing. She has tried OTC cough suppressant for the symptoms. Her past medical history is significant for bronchitis.       Constitution: Positive for fatigue. Negative for chills, sweating and fever.   HENT: Negative for ear pain, congestion, sinus pain, sinus pressure, sore throat and voice change.    Neck: Negative for painful lymph nodes.   Eyes: Negative for eye redness.   Respiratory: Positive for cough. Negative for chest tightness, sputum production, bloody sputum, COPD, shortness of breath, stridor, wheezing and asthma.    Gastrointestinal: Negative for nausea and vomiting.   Musculoskeletal: Negative for muscle ache.   Skin: Negative for rash.   Allergic/Immunologic: Negative for seasonal allergies and asthma.   Hematologic/Lymphatic: Negative for swollen lymph nodes.       Objective:      Physical Exam   Constitutional: She is oriented to person, place, and time. She appears well-developed and well-nourished. She is cooperative.  Non-toxic appearance. She does not appear ill. No distress.   HENT:   Head: Normocephalic and atraumatic.   Right Ear: Hearing, tympanic membrane, external ear and ear canal normal.   Left Ear: Hearing, tympanic membrane, external ear and ear canal normal.   Nose: Mucosal edema and rhinorrhea present. No nasal deformity. No epistaxis. Right sinus " exhibits maxillary sinus tenderness. Right sinus exhibits no frontal sinus tenderness. Left sinus exhibits maxillary sinus tenderness. Left sinus exhibits no frontal sinus tenderness.   Mouth/Throat: Uvula is midline, oropharynx is clear and moist and mucous membranes are normal. No trismus in the jaw. Normal dentition. No uvula swelling. No posterior oropharyngeal erythema.   Purulent PND   Eyes: Conjunctivae and lids are normal. No scleral icterus.   Sclera clear bilat   Neck: Trachea normal, full passive range of motion without pain and phonation normal. Neck supple.   Cardiovascular: Normal rate, regular rhythm, normal heart sounds, intact distal pulses and normal pulses.   Pulmonary/Chest: Effort normal and breath sounds normal. No respiratory distress.   Abdominal: Soft. Normal appearance and bowel sounds are normal. She exhibits no distension. There is no tenderness.   Musculoskeletal: Normal range of motion. She exhibits no edema or deformity.   Neurological: She is alert and oriented to person, place, and time. She exhibits normal muscle tone. Coordination normal.   Skin: Skin is warm, dry and intact. She is not diaphoretic. No pallor.   Psychiatric: She has a normal mood and affect. Her speech is normal and behavior is normal. Judgment and thought content normal. Cognition and memory are normal.   Nursing note and vitals reviewed.      Assessment:       1. Acute non-recurrent maxillary sinusitis    2. Cough        Plan:         Acute non-recurrent maxillary sinusitis  -     dexamethasone injection 5 mg  -     predniSONE (DELTASONE) 20 MG tablet; Take 2 tablets (40 mg total) by mouth once daily. for 5 days  Dispense: 10 tablet; Refill: 0  -     fluticasone (FLONASE) 50 mcg/actuation nasal spray; 2 sprays (100 mcg total) by Each Nare route once daily.  Dispense: 1 Bottle; Refill: 0  -     doxycycline (MONODOX) 100 MG capsule; Take 1 capsule (100 mg total) by mouth 2 (two) times daily. for 7 days  Dispense:  14 capsule; Refill: 0    Cough  -     guaifenesin-codeine 100-10 mg/5 ml (TUSSI-ORGANIDIN NR)  mg/5 mL syrup; Take 10 mLs by mouth every 6 (six) hours as needed for Cough.  Dispense: 118 mL; Refill: 0  -     albuterol (PROVENTIL/VENTOLIN HFA) 90 mcg/actuation inhaler; Inhale 2 puffs into the lungs every 6 (six) hours as needed for Wheezing. Rescue  Dispense: 18 g; Refill: 0      Patient Instructions     Sinusitis (Antibiotic Treatment)    The sinuses are air-filled spaces within the bones of the face. They connect to the inside of the nose. Sinusitis is an inflammation of the tissue lining the sinus cavity. Sinus inflammation can occur during a cold. It can also be due to allergies to pollens and other particles in the air. Sinusitis can cause symptoms of sinus congestion and fullness. A sinus infection causes fever, headache and facial pain. There is often green or yellow drainage from the nose or into the back of the throat (post-nasal drip). You have been given antibiotics to treat this condition.  Home care:  · Take the full course of antibiotics as instructed. Do not stop taking them, even if you feel better.  · Drink plenty of water, hot tea, and other liquids. This may help thin mucus. It also may promote sinus drainage.  · Heat may help soothe painful areas of the face. Use a towel soaked in hot water. Or,  the shower and direct the hot spray onto your face. Using a vaporizer along with a menthol rub at night may also help.   · An expectorant containing guaifenesin may help thin the mucus and promote drainage from the sinuses.  · Over-the-counter decongestants may be used unless a similar medicine was prescribed. Nasal sprays work the fastest. Use one that contains phenylephrine or oxymetazoline. First blow the nose gently. Then use the spray. Do not use these medicines more often than directed on the label or symptoms may get worse. You may also use tablets containing pseudoephedrine. Avoid  products that combine ingredients, because side effects may be increased. Read labels. You can also ask the pharmacist for help. (NOTE: Persons with high blood pressure should not use decongestants. They can raise blood pressure.)  · Over-the-counter antihistamines may help if allergies contributed to your sinusitis.    · Do not use nasal rinses or irrigation during an acute sinus infection, unless told to by your health care provider. Rinsing may spread the infection to other sinuses.  · Use acetaminophen or ibuprofen to control pain, unless another pain medicine was prescribed. (If you have chronic liver or kidney disease or ever had a stomach ulcer, talk with your doctor before using these medicines. Aspirin should never be used in anyone under 18 years of age who is ill with a fever. It may cause severe liver damage.)  · Don't smoke. This can worsen symptoms.  Follow-up care  Follow up with your healthcare provider or our staff if you are not improving within the next week.  When to seek medical advice  Call your healthcare provider if any of these occur:  · Facial pain or headache becoming more severe  · Stiff neck  · Unusual drowsiness or confusion  · Swelling of the forehead or eyelids  · Vision problems, including blurred or double vision  · Fever of 100.4ºF (38ºC) or higher, or as directed by your healthcare provider  · Seizure  · Breathing problems  · Symptoms not resolving within 10 days  Date Last Reviewed: 4/13/2015  © 2769-4405 [employer]. [address] [place] [address], [place] [address]. All rights reserved. This information is not intended as a substitute for professional medical care. Always follow your healthcare professional's instructions.

## 2019-01-02 NOTE — PATIENT INSTRUCTIONS
Sinusitis (Antibiotic Treatment)    The sinuses are air-filled spaces within the bones of the face. They connect to the inside of the nose. Sinusitis is an inflammation of the tissue lining the sinus cavity. Sinus inflammation can occur during a cold. It can also be due to allergies to pollens and other particles in the air. Sinusitis can cause symptoms of sinus congestion and fullness. A sinus infection causes fever, headache and facial pain. There is often green or yellow drainage from the nose or into the back of the throat (post-nasal drip). You have been given antibiotics to treat this condition.  Home care:  · Take the full course of antibiotics as instructed. Do not stop taking them, even if you feel better.  · Drink plenty of water, hot tea, and other liquids. This may help thin mucus. It also may promote sinus drainage.  · Heat may help soothe painful areas of the face. Use a towel soaked in hot water. Or,  the shower and direct the hot spray onto your face. Using a vaporizer along with a menthol rub at night may also help.   · An expectorant containing guaifenesin may help thin the mucus and promote drainage from the sinuses.  · Over-the-counter decongestants may be used unless a similar medicine was prescribed. Nasal sprays work the fastest. Use one that contains phenylephrine or oxymetazoline. First blow the nose gently. Then use the spray. Do not use these medicines more often than directed on the label or symptoms may get worse. You may also use tablets containing pseudoephedrine. Avoid products that combine ingredients, because side effects may be increased. Read labels. You can also ask the pharmacist for help. (NOTE: Persons with high blood pressure should not use decongestants. They can raise blood pressure.)  · Over-the-counter antihistamines may help if allergies contributed to your sinusitis.    · Do not use nasal rinses or irrigation during an acute sinus infection, unless told to by  your health care provider. Rinsing may spread the infection to other sinuses.  · Use acetaminophen or ibuprofen to control pain, unless another pain medicine was prescribed. (If you have chronic liver or kidney disease or ever had a stomach ulcer, talk with your doctor before using these medicines. Aspirin should never be used in anyone under 18 years of age who is ill with a fever. It may cause severe liver damage.)  · Don't smoke. This can worsen symptoms.  Follow-up care  Follow up with your healthcare provider or our staff if you are not improving within the next week.  When to seek medical advice  Call your healthcare provider if any of these occur:  · Facial pain or headache becoming more severe  · Stiff neck  · Unusual drowsiness or confusion  · Swelling of the forehead or eyelids  · Vision problems, including blurred or double vision  · Fever of 100.4ºF (38ºC) or higher, or as directed by your healthcare provider  · Seizure  · Breathing problems  · Symptoms not resolving within 10 days  Date Last Reviewed: 4/13/2015  © 1787-8980 [employer]. [address] [place] [address], [place] [address]. All rights reserved. This information is not intended as a substitute for professional medical care. Always follow your healthcare professional's instructions.

## 2019-01-02 NOTE — LETTER
January 2, 2019      [facility] [address] [facility], [place] [address]  Phone: [phone]  Fax: [phone]       Patient: Zhanna Carlton   YOB: 1985  Date of Visit: 01/02/2019    To Whom It May Concern:    Sheron Carlton  was at [facility] on 01/02/2019. She may return to work/school on 1/3/2019 with no restrictions. If you have any questions or concerns, or if I can be of further assistance, please do not hesitate to contact me.    Sincerely,      Jenni Garcia MD

## 2019-01-25 NOTE — PROGRESS NOTES
Patient ID: Zhanna Carlton is a 33 y.o. female.    Chief Complaint: Breast Cancer Screening (High Risk Screening/Family Hx of Breast Cancer)        HPI:  New patient to the breast center presents today for increased risk of breast cancer.    Patient denies palpable breast mass, breast pain, breast-related skin changes, nipple discharge and nipple retraction.     Breast History:    Personal history of breast cancer:  no  Personal history of breast biopsy:  no prior biopsy  Personal history of breast surgery:  no    Breast Imaging  No hx of breast imaging.    GYN History:  Age of menarche:  11 y/o  Uterus and ovaries:  intact  LMP:  Around 2019  HRT usage:  never  , first live birth at 19 y/o  S/p tubal ligation    Other Oncologic History:  Personal history of other cancer not abovementioned:  no  Personal history of genetic testing:  no    Social History:  Tobacco use:  currently smokes 1/2ppd, been a smoker since 16 y/o, would like to quit  Alcohol use:  Less than a few drinks/month, only occasionally  Exercise:  denies    Family Oncologic History:    Ashkenazi Confucianism ancestry:  no    Family History   Problem Relation Age of Onset    Breast cancer Paternal Grandmother         unk age of onset    Ovarian cancer Paternal Grandmother     Breast cancer Mother 60        unilateral, no genetic testing    Breast cancer Sister 45        maternal half-sister, unilateral, BRCA-negative    Breast cancer Other         maternal GGM     maternal GGF with lung cancer     History of genetic testing in relatives:  yes, see above      Patient's Breast Cancer Risk Assessment Scores:  Taking into account the above information, the patient's breast cancer risk assessment scores were calculated today as follows:  Tyrer-Cuzick lifetime risk:  27.9%  Radha model 5-year risk:  1.1%      Review of Systems - See HPI.  Objective:   Physical Exam   Pulmonary/Chest: She exhibits no mass, no edema and no deformity. Right breast  exhibits no inverted nipple, no mass, no nipple discharge, no skin change and no tenderness. Left breast exhibits no inverted nipple, no mass, no nipple discharge, no skin change and no tenderness. Breasts are symmetrical. There is no breast swelling.   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  -Breathing non-labored.   Lymphadenopathy:     She has no cervical adenopathy.     She has no axillary adenopathy.        Right: No supraclavicular adenopathy present.        Left: No supraclavicular adenopathy present.     Assessment:       1. Breast cancer screening, high risk patient    2. Increased risk of breast cancer    3. At high risk for breast cancer    4. Supernumerary nipple          Plan:     HIGH-RISK COUNSELING    Counseled patient regarding her being at increased risk for breast cancer based on risk factors which patient and I discussed today and which were factored into the Tyrer-Cuzick risk assessment model, which estimated a lifetime risk of breast cancer of 27.% for this patient as of today.  Discussed with patient that there are several models available for stratifying breast cancer risk, and Tyrer-Cuzick is presently the model utilized by [facility] and is a model recommended per current NCCN guidelines.      Discussed with patient that many factors can influence an individuals risk for breast cancer, including both modifiable and non-modifiable risk factors.  Discussed with patient that several elements increasing her risk for breast cancer are non-modifiable, such as family hx; however, her modifiable breast cancer risk factors include her BMI.      Counseled patient regarding modifiable risk factors for breast cancer as recommended by NCCN, including exercising, maintaining a healthy BMI, limiting alcohol consumption to less than one drink per day, and refraining from smoking.      Discussed with  patient current NCCN guideline recommendations for increased breast cancer screening in individuals with a lifetime risk of breast cancer >20%, to include semiannual CBE, along with annual mammogram and annual breast MRI, which would alternate.  Risks and benefits of increased screening with breast MRI were discussed.    Discussed with patient option of speaking with Medical Oncology regarding taking a pharmacologic agent such as tamoxifen or an AI to reduce breast cancer risk.     Discussed with patient option to speak with a breast surgeon regarding risk-reducing mastectomy, though this is generally only considered in women with a genetic mutation conferring a high risk for breast cancer or with a compelling family history.    Discussed with patient option for genetic counseling through [facility].    HIGH-RISK PLAN    After a thorough discussion, patient elects to proceed with increased breast cancer screenings to include semiannual CBE, along with annual mammogram and annual breast MRI, which would alternate.  Order placed for bilateral breast MRI, to occur in 6 months.  Advised patient to contact the pricing transparency line (phone number provided), prior to undergoing breast MRI, to determine her out-of-pocket cost.  Advised patient to RTC in 6 months for CBE regardless of whether she elects to undergo breast MRI.  If patient does undergo breast MRI, serum creatinine level needs to be collected within the 4 weeks preceding breast MRI.  Order placed.    Patient also elects to proceed with initiating a referral to [facility] for genetic counseling.  Paperwork initiated.    Patient declines an ambulatory referral to Medical Oncology to discuss chemoprevention and an ambulatory referral to a breast surgeon to discuss prophylactic mastectomy; patient was advised to contact clinic with any changes in her decision.    OTHER PLAN    Amb referral to Smoking Cessation placed per pt request.  Unsure if pt will qualify  given she did not begin smoking prior to .  Provided pt with flyer containing number to call to request appt.    Clinically [facility] today.  Order placed for baseline screening mmg.  Pt to return at later date/time for this.  Included in order comments that left supernumerary nipple should be included in screening mmg.    Encouraged breast awareness, including monthly breast self-exams.  Advised patient to RTC with any interval changes or concerns.  Questions were encouraged and answered to patient's satisfaction, and patient verbalized understanding of information and agreement with the plan.    Time in Counselin minutes  Total Time:  38 minutes  >50% of time was spent in face-to-face counseling.

## 2019-01-28 ENCOUNTER — OFFICE VISIT (OUTPATIENT)
Dept: SURGERY | Facility: CLINIC | Age: 34
End: 2019-01-28
Payer: COMMERCIAL

## 2019-01-28 VITALS
SYSTOLIC BLOOD PRESSURE: 142 MMHG | HEART RATE: 88 BPM | WEIGHT: 225.44 LBS | HEIGHT: 65 IN | BODY MASS INDEX: 37.56 KG/M2 | TEMPERATURE: 99 F | DIASTOLIC BLOOD PRESSURE: 72 MMHG

## 2019-01-28 DIAGNOSIS — Z12.39 BREAST CANCER SCREENING, HIGH RISK PATIENT: Primary | ICD-10-CM

## 2019-01-28 DIAGNOSIS — Q83.3 SUPERNUMERARY NIPPLE: ICD-10-CM

## 2019-01-28 DIAGNOSIS — Z91.89 INCREASED RISK OF BREAST CANCER: ICD-10-CM

## 2019-01-28 DIAGNOSIS — Z91.89 AT HIGH RISK FOR BREAST CANCER: ICD-10-CM

## 2019-01-28 PROCEDURE — 99214 OFFICE O/P EST MOD 30 MIN: CPT | Mod: PBBFAC,PO | Performed by: NURSE PRACTITIONER

## 2019-01-28 PROCEDURE — 99999 PR PBB SHADOW E&M-EST. PATIENT-LVL IV: ICD-10-PCS | Mod: PBBFAC,,, | Performed by: NURSE PRACTITIONER

## 2019-01-28 PROCEDURE — 99202 PR OFFICE/OUTPT VISIT, NEW, LEVL II, 15-29 MIN: ICD-10-PCS | Mod: S$GLB,,, | Performed by: NURSE PRACTITIONER

## 2019-01-28 PROCEDURE — 99202 OFFICE O/P NEW SF 15 MIN: CPT | Mod: S$GLB,,, | Performed by: NURSE PRACTITIONER

## 2019-01-28 PROCEDURE — 99999 PR PBB SHADOW E&M-EST. PATIENT-LVL IV: CPT | Mod: PBBFAC,,, | Performed by: NURSE PRACTITIONER

## 2019-02-02 ENCOUNTER — HOSPITAL ENCOUNTER (OUTPATIENT)
Dept: RADIOLOGY | Facility: HOSPITAL | Age: 34
Discharge: HOME | End: 2019-02-02
Attending: NURSE PRACTITIONER
Payer: COMMERCIAL

## 2019-02-02 DIAGNOSIS — Z12.39 BREAST CANCER SCREENING, HIGH RISK PATIENT: ICD-10-CM

## 2019-02-02 PROCEDURE — 77067 MAMMO DIGITAL SCREENING BILAT WITH TOMOSYNTHESIS_CAD: ICD-10-PCS | Mod: 26,,, | Performed by: RADIOLOGY

## 2019-02-02 PROCEDURE — 77067 SCR MAMMO BI INCL CAD: CPT | Mod: TC

## 2019-02-02 PROCEDURE — 77067 SCR MAMMO BI INCL CAD: CPT | Mod: 26,,, | Performed by: RADIOLOGY

## 2019-02-02 PROCEDURE — 77063 BREAST TOMOSYNTHESIS BI: CPT | Mod: 26,,, | Performed by: RADIOLOGY

## 2019-02-02 PROCEDURE — 77063 MAMMO DIGITAL SCREENING BILAT WITH TOMOSYNTHESIS_CAD: ICD-10-PCS | Mod: 26,,, | Performed by: RADIOLOGY

## 2019-02-08 ENCOUNTER — PATIENT MESSAGE (OUTPATIENT)
Dept: SURGERY | Facility: CLINIC | Age: 34
End: 2019-02-08

## 2019-02-08 ENCOUNTER — DOCUMENTATION ONLY (OUTPATIENT)
Dept: SURGERY | Facility: CLINIC | Age: 34
End: 2019-02-08

## 2019-02-08 NOTE — PROGRESS NOTES
Received fax from [email or web address] (Juanpablo Brink) on 2/6/19 advising that pt has not returned [email or web address]'s multiple phone calls to schedule genetic counseling appt and that pt can contact their Patient Care team at [phone] if she wishes to initiate services again.      Pt has been messaged via portal with this info (see email encounter).    See Media for full notice.

## 2019-02-15 NOTE — PROGRESS NOTES
bilateral breast MRI, to occur in 6 months.  Advised patient to RTC in 6 months for CBE regardless of whether she elects to undergo breast MRI.  If patient does undergo breast MRI, serum creatinine level needs to be collected within the 4 weeks preceding breast MRI.

## 2019-08-29 ENCOUNTER — TELEPHONE (OUTPATIENT)
Dept: SURGERY | Facility: CLINIC | Age: 34
End: 2019-08-29

## 2019-08-29 ENCOUNTER — PATIENT MESSAGE (OUTPATIENT)
Dept: SURGERY | Facility: CLINIC | Age: 34
End: 2019-08-29

## 2019-08-29 NOTE — TELEPHONE ENCOUNTER
----- Message from Billy Hicks DNP sent at 8/28/2019  4:43 PM CDT -----  Nichele,    This patient is overdue for follow-up CBE with me.  Please call her to schedule.      Thanks,  Billy

## 2019-10-03 ENCOUNTER — OFFICE VISIT (OUTPATIENT)
Dept: OBSTETRICS AND GYNECOLOGY | Facility: CLINIC | Age: 34
End: 2019-10-03
Payer: COMMERCIAL

## 2019-10-03 VITALS
DIASTOLIC BLOOD PRESSURE: 80 MMHG | SYSTOLIC BLOOD PRESSURE: 128 MMHG | WEIGHT: 218.25 LBS | HEIGHT: 65 IN | BODY MASS INDEX: 36.36 KG/M2

## 2019-10-03 DIAGNOSIS — R35.0 URINARY FREQUENCY: ICD-10-CM

## 2019-10-03 DIAGNOSIS — Z01.419 WOMEN'S ANNUAL ROUTINE GYNECOLOGICAL EXAMINATION: Primary | ICD-10-CM

## 2019-10-03 DIAGNOSIS — L29.2 VULVAR ITCHING: ICD-10-CM

## 2019-10-03 DIAGNOSIS — N90.89 VULVAR IRRITATION: ICD-10-CM

## 2019-10-03 DIAGNOSIS — N92.1 METRORRHAGIA: ICD-10-CM

## 2019-10-03 LAB
B-HCG UR QL: NEGATIVE
C TRACH DNA SPEC QL NAA+PROBE: NOT DETECTED
CANDIDA RRNA VAG QL PROBE: NEGATIVE
CTP QC/QA: YES
G VAGINALIS RRNA GENITAL QL PROBE: NEGATIVE
N GONORRHOEA DNA SPEC QL NAA+PROBE: NOT DETECTED
T VAGINALIS RRNA GENITAL QL PROBE: NEGATIVE

## 2019-10-03 PROCEDURE — 87086 URINE CULTURE/COLONY COUNT: CPT

## 2019-10-03 PROCEDURE — 99395 PR PREVENTIVE VISIT,EST,18-39: ICD-10-PCS | Mod: S$GLB,,, | Performed by: NURSE PRACTITIONER

## 2019-10-03 PROCEDURE — 88175 CYTOPATH C/V AUTO FLUID REDO: CPT

## 2019-10-03 PROCEDURE — 87661 TRICHOMONAS VAGINALIS AMPLIF: CPT

## 2019-10-03 PROCEDURE — 87491 CHLMYD TRACH DNA AMP PROBE: CPT

## 2019-10-03 PROCEDURE — 99395 PREV VISIT EST AGE 18-39: CPT | Mod: S$GLB,,, | Performed by: NURSE PRACTITIONER

## 2019-10-03 PROCEDURE — 99999 PR PBB SHADOW E&M-EST. PATIENT-LVL III: CPT | Mod: PBBFAC,,, | Performed by: NURSE PRACTITIONER

## 2019-10-03 PROCEDURE — 81025 POCT URINE PREGNANCY: ICD-10-PCS | Mod: S$GLB,,, | Performed by: NURSE PRACTITIONER

## 2019-10-03 PROCEDURE — 87624 HPV HI-RISK TYP POOLED RSLT: CPT

## 2019-10-03 PROCEDURE — 81025 URINE PREGNANCY TEST: CPT | Mod: S$GLB,,, | Performed by: NURSE PRACTITIONER

## 2019-10-03 PROCEDURE — 99999 PR PBB SHADOW E&M-EST. PATIENT-LVL III: ICD-10-PCS | Mod: PBBFAC,,, | Performed by: NURSE PRACTITIONER

## 2019-10-03 RX ORDER — NYSTATIN 100000 U/G
CREAM TOPICAL 2 TIMES DAILY
Qty: 30 G | Refills: 0 | Status: SHIPPED | OUTPATIENT
Start: 2019-10-03 | End: 2023-07-11

## 2019-10-03 RX ORDER — FLUCONAZOLE 150 MG/1
150 TABLET ORAL DAILY
Qty: 1 TABLET | Refills: 0 | Status: SHIPPED | OUTPATIENT
Start: 2019-10-03 | End: 2019-10-04

## 2019-10-03 RX ORDER — TRIAMCINOLONE ACETONIDE 1 MG/G
CREAM TOPICAL 2 TIMES DAILY
Qty: 45 G | Refills: 0 | Status: SHIPPED | OUTPATIENT
Start: 2019-10-03 | End: 2023-07-11

## 2019-10-03 NOTE — PROGRESS NOTES
CC: Annual  HPI: Pt is a 34 y.o.  female who presents for routine annual exam. She uses nothing for contraception, BTL in 2017. She does want STD screening.   The patient participates in regular exercise: No.  The patient smokes 1/2 PPD.  The patient wears seatbelts.   Pt denies any domestic violence. Reports increased urinary frequency for 1 week without further urinary symptoms.    She reports increasingly heavy bleeding with menstrual periods over the past 2 years, soaking 1 pad/2-3 hours for first 1-2 days of menses. Regular menstrual cycles. Mammogram in 2/2019 with normal result- significant familial history of breast CA- sees breast specialist. Needs return visit to specialist at this time. Left supra numerary nipple. Last pap smear in 2016 with normal result. She declines Gardasil series at this time.      FH:  Breast cancer: sister @ 44 yo- BRCA negative, mother @ 61 yo.  Colon cancer: none  Ovarian cancer: none  Endometrial cancer: none    ROS:  GENERAL: Feeling well overall. Denies fever or chills.   SKIN: Denies rash or lesions.   HEAD: Denies head injury or headache.   NODES: Denies enlarged lymph nodes.   CHEST: Denies chest pain or shortness of breath.   CARDIOVASCULAR: Denies palpitations or left sided chest pain.   ABDOMEN: No abdominal pain, constipation, diarrhea, nausea, vomiting or rectal bleeding.   URINARY: No dysuria, hematuria, or burning on urination.  REPRODUCTIVE: See HPI.   BREASTS: Denies pain, lumps, or nipple discharge.   HEMATOLOGIC: No easy bruisability or excessive bleeding.   MUSCULOSKELETAL: Denies joint pain or swelling.   NEUROLOGIC: Denies syncope or weakness.   PSYCHIATRIC: Denies depression, anxiety or mood swings.    PE:   APPEARANCE: Well nourished, well developed, White female in no acute distress.  NODES: no cervical, supraclavicular, or inguinal lymphadenopathy  BREASTS: Symmetrical, no skin changes or visible lesions. No palpable masses, nipple discharge or adenopathy  bilaterally. Supra numerary nipple just inferior to left breast. Dense tissue with fibrocystic changes bilaterally.   ABDOMEN: Soft. No tenderness or masses. No distention. No hernias palpated. No CVA tenderness.  VULVA: No lesions. Normal external female genitalia. Mild erythema and swelling.  URETHRAL MEATUS: Normal size and location, no lesions, no prolapse.  URETHRA: No masses, tenderness, or prolapse.  VAGINA: Moist. No lesions or lacerations noted. Small amount of thick, light brown discharge. No odor present.   CERVIX: No lesions or discharge. No cervical motion tenderness.   UTERUS: Normal size, regular shape, mobile, non-tender.  ADNEXA: No tenderness. No fullness or masses palpated in the adnexal regions.   ANUS PERINEUM: Normal.      Diagnosis:  1. Women's annual routine gynecological examination    2. Vulvar itching    3. Metrorrhagia    4. Urinary frequency    5. Vulvar irritation        Plan:     Orders Placed This Encounter    HPV High Risk Genotypes, PCR    Vaginosis Screen by DNA Probe    C. trachomatis/N. gonorrhoeae by AMP DNA [facility]; Cervicovaginal    Urine culture    US Pelvis Comp with Transvag NON-OB (xpd    POCT urine pregnancy    Liquid-based pap smear, screening    fluconazole (DIFLUCAN) 150 MG Tab    triamcinolone acetonide 0.1% (KENALOG) 0.1 % cream    nystatin (MYCOSTATIN) cream     HPV/Pap  Affirm/GCCT  Diflucan x 1 dose    Triamcinolone/Nystation cream BID for vulva irritation/itching.    Urine dip negative  Urine culture    Pelvic US for evaluation of heavy menses, will follow up with OBGYN for further evaluation.    Will contact breast center for scheduling for needed follow up exam/ possible MRI.    Patient was counseled today on the new ACS guidelines for cervical cytology screening as well as the current recommendations for breast cancer screening. She was counseled to follow up with her PCP for other routine health maintenance. Counseling session lasted approximately  10 minutes, and all her questions were answered.    Follow-up with me in 1 year for routine exam      TOMMY Puga-C

## 2019-10-04 ENCOUNTER — TELEPHONE (OUTPATIENT)
Dept: OBSTETRICS AND GYNECOLOGY | Facility: CLINIC | Age: 34
End: 2019-10-04

## 2019-10-04 LAB
BACTERIA UR CULT: NORMAL
BACTERIA UR CULT: NORMAL

## 2019-10-04 NOTE — TELEPHONE ENCOUNTER
----- Message from Kourtney Cardenas MA sent at 10/3/2019  6:27 PM CDT -----  Regarding: FW: OBGYN Visit and Breast Specialist      ----- Message -----  From: Kyleigh Iqbal NP  Sent: 10/3/2019  10:36 AM CDT  To: [facility] Staff  Subject: OBGYN Visit and Breast Specialist                Please schedule patient for follow up with Dr. Mosquera for further eval of heavy menses, please contact Delphine Hicks NP's office to schedule patient for needed high risk follow up.    Thanks,  Kyleigh

## 2019-10-08 LAB
HPV HR 12 DNA CVX QL NAA+PROBE: NEGATIVE
HPV16 AG SPEC QL: NEGATIVE
HPV18 DNA SPEC QL NAA+PROBE: NEGATIVE

## 2020-03-16 ENCOUNTER — OFFICE VISIT (OUTPATIENT)
Dept: URGENT CARE | Facility: CLINIC | Age: 35
End: 2020-03-16
Payer: COMMERCIAL

## 2020-03-16 DIAGNOSIS — Z53.21 PATIENT LEFT WITHOUT BEING SEEN: Primary | ICD-10-CM

## 2022-03-24 ENCOUNTER — OFFICE VISIT (OUTPATIENT)
Dept: URGENT CARE | Facility: CLINIC | Age: 37
End: 2022-03-24
Payer: COMMERCIAL

## 2022-03-24 VITALS
OXYGEN SATURATION: 99 % | HEART RATE: 90 BPM | WEIGHT: 218 LBS | SYSTOLIC BLOOD PRESSURE: 147 MMHG | HEIGHT: 65 IN | RESPIRATION RATE: 18 BRPM | DIASTOLIC BLOOD PRESSURE: 95 MMHG | TEMPERATURE: 100 F | BODY MASS INDEX: 36.32 KG/M2

## 2022-03-24 DIAGNOSIS — J01.90 ACUTE SINUSITIS WITH SYMPTOMS > 10 DAYS: Primary | ICD-10-CM

## 2022-03-24 LAB
CTP QC/QA: YES
SARS-COV-2 RDRP RESP QL NAA+PROBE: NEGATIVE

## 2022-03-24 PROCEDURE — U0002 COVID-19 LAB TEST NON-CDC: HCPCS | Mod: QW,S$GLB,, | Performed by: NURSE PRACTITIONER

## 2022-03-24 PROCEDURE — 99213 PR OFFICE/OUTPT VISIT, EST, LEVL III, 20-29 MIN: ICD-10-PCS | Mod: S$GLB,CS,, | Performed by: NURSE PRACTITIONER

## 2022-03-24 PROCEDURE — 1160F PR REVIEW ALL MEDS BY PRESCRIBER/CLIN PHARMACIST DOCUMENTED: ICD-10-PCS | Mod: CPTII,S$GLB,, | Performed by: NURSE PRACTITIONER

## 2022-03-24 PROCEDURE — 3008F BODY MASS INDEX DOCD: CPT | Mod: CPTII,S$GLB,, | Performed by: NURSE PRACTITIONER

## 2022-03-24 PROCEDURE — U0002: ICD-10-PCS | Mod: QW,S$GLB,, | Performed by: NURSE PRACTITIONER

## 2022-03-24 PROCEDURE — 3077F PR MOST RECENT SYSTOLIC BLOOD PRESSURE >= 140 MM HG: ICD-10-PCS | Mod: CPTII,S$GLB,, | Performed by: NURSE PRACTITIONER

## 2022-03-24 PROCEDURE — 1159F MED LIST DOCD IN RCRD: CPT | Mod: CPTII,S$GLB,, | Performed by: NURSE PRACTITIONER

## 2022-03-24 PROCEDURE — 3080F PR MOST RECENT DIASTOLIC BLOOD PRESSURE >= 90 MM HG: ICD-10-PCS | Mod: CPTII,S$GLB,, | Performed by: NURSE PRACTITIONER

## 2022-03-24 PROCEDURE — 4010F ACE/ARB THERAPY RXD/TAKEN: CPT | Mod: CPTII,S$GLB,, | Performed by: NURSE PRACTITIONER

## 2022-03-24 PROCEDURE — 3077F SYST BP >= 140 MM HG: CPT | Mod: CPTII,S$GLB,, | Performed by: NURSE PRACTITIONER

## 2022-03-24 PROCEDURE — 3008F PR BODY MASS INDEX (BMI) DOCUMENTED: ICD-10-PCS | Mod: CPTII,S$GLB,, | Performed by: NURSE PRACTITIONER

## 2022-03-24 PROCEDURE — 1159F PR MEDICATION LIST DOCUMENTED IN MEDICAL RECORD: ICD-10-PCS | Mod: CPTII,S$GLB,, | Performed by: NURSE PRACTITIONER

## 2022-03-24 PROCEDURE — 99213 OFFICE O/P EST LOW 20 MIN: CPT | Mod: S$GLB,CS,, | Performed by: NURSE PRACTITIONER

## 2022-03-24 PROCEDURE — 1160F RVW MEDS BY RX/DR IN RCRD: CPT | Mod: CPTII,S$GLB,, | Performed by: NURSE PRACTITIONER

## 2022-03-24 PROCEDURE — 3080F DIAST BP >= 90 MM HG: CPT | Mod: CPTII,S$GLB,, | Performed by: NURSE PRACTITIONER

## 2022-03-24 PROCEDURE — 4010F PR ACE/ARB THEARPY RXD/TAKEN: ICD-10-PCS | Mod: CPTII,S$GLB,, | Performed by: NURSE PRACTITIONER

## 2022-03-24 RX ORDER — AZITHROMYCIN 250 MG/1
250 TABLET, FILM COATED ORAL
COMMUNITY
Start: 2022-03-14 | End: 2023-07-11

## 2022-03-24 RX ORDER — AMOXICILLIN AND CLAVULANATE POTASSIUM 875; 125 MG/1; MG/1
1 TABLET, FILM COATED ORAL EVERY 12 HOURS
Qty: 14 TABLET | Refills: 0 | Status: SHIPPED | OUTPATIENT
Start: 2022-03-24 | End: 2022-03-31

## 2022-03-24 RX ORDER — PROMETHAZINE HYDROCHLORIDE AND DEXTROMETHORPHAN HYDROBROMIDE 6.25; 15 MG/5ML; MG/5ML
5 SYRUP ORAL NIGHTLY PRN
Qty: 118 ML | Refills: 0 | Status: SHIPPED | OUTPATIENT
Start: 2022-03-24 | End: 2023-07-11

## 2022-03-24 RX ORDER — AZELASTINE 1 MG/ML
1 SPRAY, METERED NASAL 2 TIMES DAILY
Qty: 30 ML | Refills: 0 | Status: SHIPPED | OUTPATIENT
Start: 2022-03-24 | End: 2023-07-11

## 2022-03-24 RX ORDER — LISINOPRIL AND HYDROCHLOROTHIAZIDE 10; 12.5 MG/1; MG/1
1 TABLET ORAL DAILY
COMMUNITY
Start: 2022-03-16 | End: 2023-03-16

## 2022-03-24 RX ORDER — FLUTICASONE PROPIONATE 50 MCG
1 SPRAY, SUSPENSION (ML) NASAL 2 TIMES DAILY
Qty: 9.9 ML | Refills: 0 | Status: SHIPPED | OUTPATIENT
Start: 2022-03-24 | End: 2023-07-11

## 2022-03-24 NOTE — LETTER
March 24, 2022      [facility] - [facility]  [address] [facility], [address]  [place] [address]  Phone: [phone]  Fax: [phone]       Patient: Zhanna Carlton   YOB: 1985  Date of Visit: 03/24/2022    To Whom It May Concern:    Sheron Carlton  was at [facility] on 03/24/2022. The patient may return to work/school on 3/26/2022. If you have any questions or concerns, or if I can be of further assistance, please do not hesitate to contact me.    Sincerely,    Geraldo Carlos NP

## 2022-03-24 NOTE — PROGRESS NOTES
"Subjective:       Patient ID: Zhanna Carlton is a 36 y.o. female.    Vitals:  height is 5' 5" (1.651 m) and weight is 98.9 kg (218 lb). Her temperature is 99.7 °F (37.6 °C). Her blood pressure is 147/95 (abnormal) and her pulse is 90. Her respiration is 18 and oxygen saturation is 99%.     Chief Complaint: Sinus Problem    36-year-old female presents to clinic for evaluation nasal congestion, sinus pressure, bilateral ear pressure, headaches, sore throat, and mild, worse at night.  Patient states that symptoms have been ongoing for approximately 2 weeks.  She is vaccinated for COVID, +booster.  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  Patient states that symptoms began to improve, but shortly return.  Patient states that she has also been taking over-the-counter cough cold medications with minimal relief.  She denies chest pain, fever, shortness of breath.  She is awake and alert, answers questions appropriately, no acute distress noted on today's visit.    Sinus Problem  This is a new problem. The current episode started 1 to 4 weeks ago. The problem has been gradually worsening since onset. There has been no fever. Associated symptoms include congestion, coughing, ear pain, headaches, sinus pressure and a sore throat. Pertinent negatives include no chills, diaphoresis or shortness of breath. (Runny nose, diarrhea) Treatments tried: zpack. The treatment provided mild relief.       Constitution: Negative for activity change, appetite change, chills, sweating, fatigue and fever.   HENT: Positive for ear pain, congestion, sinus pressure and sore throat.    Cardiovascular: Negative for chest pain, palpitations and sob on exertion.   Respiratory: Positive for cough. Negative for shortness of breath.    Gastrointestinal: Negative for abdominal pain, nausea and vomiting.   Neurological: Positive for headaches. Negative for dizziness, numbness and tingling.       Objective:    "   Physical Exam   Constitutional: She is oriented to person, place, and time. She appears well-developed.  Non-toxic appearance. She does not appear ill. No distress.   HENT:   Head: Normocephalic and atraumatic. Head is without abrasion, without contusion and without laceration.   Ears:   Right Ear: Tympanic membrane and external ear normal.   Left Ear: Tympanic membrane and external ear normal.   Nose: Mucosal edema and congestion present. No rhinorrhea. Right sinus exhibits maxillary sinus tenderness. Left sinus exhibits maxillary sinus tenderness.   Mouth/Throat: Mucous membranes are normal. Mucous membranes are moist. Posterior oropharyngeal erythema present. No oropharyngeal exudate. Oropharynx is clear.   Eyes: Conjunctivae, EOM and lids are normal. Right eye exhibits no discharge. Left eye exhibits no discharge.   Neck: Trachea normal and phonation normal. Neck supple.   Cardiovascular: Normal rate, regular rhythm and normal heart sounds.   Pulmonary/Chest: Effort normal and breath sounds normal. No respiratory distress. She has no wheezes.   Abdominal: Normal appearance.   Musculoskeletal: Normal range of motion.         General: Normal range of motion.   Neurological: She is alert and oriented to person, place, and time.   Skin: Skin is warm, dry, intact, not diaphoretic and not pale. No abrasion, No burn and No ecchymosis   Psychiatric: Her speech is normal and behavior is normal. Mood, judgment and thought content normal.   Nursing note and vitals reviewed.    Results for orders placed or performed in visit on 03/24/22   POCT COVID-19 Rapid Screening   Result Value Ref Range    POC Rapid COVID Negative Negative     Acceptable Yes            Assessment:       1. Acute sinusitis with symptoms > 10 days          Plan:         Acute sinusitis with symptoms > 10 days  -     POCT COVID-19 Rapid Screening  -     fluticasone propionate (FLONASE) 50 mcg/actuation nasal spray; 1 spray (50 mcg total)  by Each Nostril route 2 (two) times daily.  Dispense: 9.9 mL; Refill: 0  -     azelastine (ASTELIN) 137 mcg (0.1 %) nasal spray; 1 spray (137 mcg total) by Nasal route 2 (two) times daily. for 10 days  Dispense: 30 mL; Refill: 0  -     amoxicillin-clavulanate 875-125mg (AUGMENTIN) 875-125 mg per tablet; Take 1 tablet by mouth every 12 (twelve) hours. for 7 days  Dispense: 14 tablet; Refill: 0  -     promethazine-dextromethorphan (PROMETHAZINE-DM) 6.25-15 mg/5 mL Syrp; Take 5 mLs by mouth nightly as needed (cough).  Dispense: 118 mL; Refill: 0    - Due to length of symptoms, and failed treatment with over-the-counter cough and cold medications, will treat for acute bacterial sinusitis with Augmentin.  Complete antibiotics as directed.  Follow-up with PCP.  Return to clinic as needed.  ER precautions given.  Patient verbalized understanding and is in agreement with plan.    Patient Instructions   - You must understand that you have received an Urgent Care treatment only and that you may be released before all of your medical problems are known or treated.   - You, the patient, will arrange for follow up care as instructed.   - If your condition worsens or fails to improve we recommend that you receive another evaluation at the ER immediately or contact your PCP to discuss your concerns or return here.       - Rest.    - Drink plenty of fluids.     - Tylenol or Ibuprofen as directed as needed for fever/pain. Avoid tylenol if you have a history of liver disease. Do not take ibuprofen if you have a history of GI bleeding, kidney disease, or if you take blood thinners.   - Take ibuprofen 600-800 mg every 6-8 hours for pain and inflammation.  You can also take Tylenol/acetaminophen 650-1000 mg every 6-8 hours for added pain relief.     - You can take over-the-counter claritin, zyrtec, allegra, or xyzal as directed. These are antihistamines that can help with runny nose, nasal congestion, sneezing, and helps to dry up  post-nasal drip, which usually causes sore throat and cough.              - If you do NOT have high blood pressure, you may use a decongestant form (D)  of this medication or if you do not take the D form, you can take sudafed (pseudoephedrine) over the counter, which is a decongestant.     - You can use Flonase (fluticasone) nasal spray as directed for sinus congestion and postnasal drip. This is a steroid nasal spray that works locally over time to decrease the inflammation in your nose/sinuses and help with allergic symptoms. This is not an quick- relief spray like afrin, but it works well if used daily.  Discontinue if you develop nose bleed  - use nasal saline prior to Flonase.     - Use Ocean Spray Nasal Saline 1-3 puffs each nostril every 2-3 hours then blow out onto tissue. This is to irrigate the nasal passage way to clear the sinus openings. Use until sinus problem resolved.     - you can take plain Mucinex (guaifenesin) 1200 mg twice a day to help loosen mucous     -warm salt water gargles can help with sore throat     - warm tea with honey can help with cough. Honey is a natural cough suppressant.     - Follow up with your PCP or specialty clinic as directed in the next 1-2 weeks if not improved or as needed.  You can call [phone] to schedule an appointment with the appropriate provider.       - Go to the ER if you develop new or worsening symptoms.

## 2022-03-24 NOTE — PATIENT INSTRUCTIONS

## 2022-03-25 NOTE — PROGRESS NOTES
Dr. Noble notified that patient has concerns about pain management. Patient has consistently reported a pain level of 4/10 with pain relief from percocet and motrin. Patient now with complaints of pain 5/10 and would like to speak with someone regarding pain management. Patient states that she is passing gas and had a bowel movement today. RN has encouraged patient to walk frequently this shift. Per MD, ok to give next dose of Percocet early. MD states she will come to patient's bedside to discuss pain management.    [Fully active, able to carry on all pre-disease performance without restriction] : Status 0 - Fully active, able to carry on all pre-disease performance without restriction [Normal] : affect appropriate [de-identified] : No icterus [de-identified] : MMM O/P clear  [de-identified] : Supple No LAD  [de-identified] : S1 S2  [de-identified] : No edema [de-identified] : No spine/CVA tenderness [de-identified] : Ambulatory

## 2022-05-12 ENCOUNTER — TELEPHONE (OUTPATIENT)
Dept: OBSTETRICS AND GYNECOLOGY | Facility: CLINIC | Age: 37
End: 2022-05-12
Payer: COMMERCIAL

## 2022-05-12 NOTE — TELEPHONE ENCOUNTER
----- Message from Kajal Paul sent at 5/12/2022  3:28 PM CDT -----  Pt is calling to schedule an appt  Pt can be contacted at [phone]

## 2022-06-09 ENCOUNTER — OFFICE VISIT (OUTPATIENT)
Dept: URGENT CARE | Facility: CLINIC | Age: 37
End: 2022-06-09
Payer: COMMERCIAL

## 2022-06-09 VITALS
RESPIRATION RATE: 16 BRPM | WEIGHT: 218 LBS | OXYGEN SATURATION: 96 % | TEMPERATURE: 98 F | DIASTOLIC BLOOD PRESSURE: 89 MMHG | HEART RATE: 93 BPM | HEIGHT: 65 IN | SYSTOLIC BLOOD PRESSURE: 130 MMHG | BODY MASS INDEX: 36.32 KG/M2

## 2022-06-09 DIAGNOSIS — J40 BRONCHITIS: ICD-10-CM

## 2022-06-09 DIAGNOSIS — J32.9 RHINOSINUSITIS: ICD-10-CM

## 2022-06-09 DIAGNOSIS — H66.002 NON-RECURRENT ACUTE SUPPURATIVE OTITIS MEDIA OF LEFT EAR WITHOUT SPONTANEOUS RUPTURE OF TYMPANIC MEMBRANE: Primary | ICD-10-CM

## 2022-06-09 LAB
CTP QC/QA: YES
SARS-COV-2 RDRP RESP QL NAA+PROBE: NEGATIVE

## 2022-06-09 PROCEDURE — U0002: ICD-10-PCS | Mod: QW,S$GLB,,

## 2022-06-09 PROCEDURE — 99213 OFFICE O/P EST LOW 20 MIN: CPT | Mod: S$GLB,,,

## 2022-06-09 PROCEDURE — 3075F SYST BP GE 130 - 139MM HG: CPT | Mod: CPTII,S$GLB,,

## 2022-06-09 PROCEDURE — 1159F PR MEDICATION LIST DOCUMENTED IN MEDICAL RECORD: ICD-10-PCS | Mod: CPTII,S$GLB,,

## 2022-06-09 PROCEDURE — 3075F PR MOST RECENT SYSTOLIC BLOOD PRESS GE 130-139MM HG: ICD-10-PCS | Mod: CPTII,S$GLB,,

## 2022-06-09 PROCEDURE — 1160F RVW MEDS BY RX/DR IN RCRD: CPT | Mod: CPTII,S$GLB,,

## 2022-06-09 PROCEDURE — 1159F MED LIST DOCD IN RCRD: CPT | Mod: CPTII,S$GLB,,

## 2022-06-09 PROCEDURE — 99213 PR OFFICE/OUTPT VISIT, EST, LEVL III, 20-29 MIN: ICD-10-PCS | Mod: S$GLB,,,

## 2022-06-09 PROCEDURE — 3079F DIAST BP 80-89 MM HG: CPT | Mod: CPTII,S$GLB,,

## 2022-06-09 PROCEDURE — U0002 COVID-19 LAB TEST NON-CDC: HCPCS | Mod: QW,S$GLB,,

## 2022-06-09 PROCEDURE — 3079F PR MOST RECENT DIASTOLIC BLOOD PRESSURE 80-89 MM HG: ICD-10-PCS | Mod: CPTII,S$GLB,,

## 2022-06-09 PROCEDURE — 4010F ACE/ARB THERAPY RXD/TAKEN: CPT | Mod: CPTII,S$GLB,,

## 2022-06-09 PROCEDURE — 3008F BODY MASS INDEX DOCD: CPT | Mod: CPTII,S$GLB,,

## 2022-06-09 PROCEDURE — 1160F PR REVIEW ALL MEDS BY PRESCRIBER/CLIN PHARMACIST DOCUMENTED: ICD-10-PCS | Mod: CPTII,S$GLB,,

## 2022-06-09 PROCEDURE — 3008F PR BODY MASS INDEX (BMI) DOCUMENTED: ICD-10-PCS | Mod: CPTII,S$GLB,,

## 2022-06-09 PROCEDURE — 4010F PR ACE/ARB THEARPY RXD/TAKEN: ICD-10-PCS | Mod: CPTII,S$GLB,,

## 2022-06-09 RX ORDER — AMOXICILLIN AND CLAVULANATE POTASSIUM 875; 125 MG/1; MG/1
1 TABLET, FILM COATED ORAL EVERY 12 HOURS
Qty: 14 TABLET | Refills: 0 | Status: SHIPPED | OUTPATIENT
Start: 2022-06-09 | End: 2022-06-16

## 2022-06-09 NOTE — PROGRESS NOTES
"Subjective:       Patient ID: Zhanna Carlton is a 36 y.o. female.    Vitals:  height is 5' 5" (1.651 m) and weight is 98.9 kg (218 lb). Her tympanic temperature is 98.1 °F (36.7 °C). Her blood pressure is 130/89 and her pulse is 93. Her respiration is 16 and oxygen saturation is 96%.     Chief Complaint: Cough    Pt is a 37 y/o female who presents with coughing and PND x2 weeks. R ear began feeling stuffy 2-3 days ago. L ear pain and pressure started yesterday.  Denies CP, SOB, f/c, n/v/d.    Cough  This is a new problem. The current episode started 1 to 4 weeks ago. The problem has been gradually worsening. Associated symptoms include ear congestion, ear pain, nasal congestion and postnasal drip. Pertinent negatives include no chest pain, chills, fever, headaches, rash, sore throat or shortness of breath. Treatments tried: mucinex, sudafed. The treatment provided mild relief. There is no history of asthma, bronchitis or COPD.       Constitution: Negative for chills and fever.   HENT: Positive for ear pain, congestion and postnasal drip. Negative for ear discharge, foreign body in ear, tinnitus and sore throat.    Neck: Negative for neck pain and neck stiffness.   Cardiovascular: Negative for chest pain.   Respiratory: Positive for cough. Negative for chest tightness and shortness of breath.    Gastrointestinal: Negative for nausea, vomiting, constipation and diarrhea.   Skin: Negative for rash.   Neurological: Negative for dizziness, light-headedness and headaches.       Objective:      Physical Exam   Constitutional: She is oriented to person, place, and time. She appears well-developed. She is cooperative.  Non-toxic appearance. She does not appear ill. No distress.   HENT:   Head: Normocephalic and atraumatic.   Ears:   Right Ear: Hearing, external ear and ear canal normal. No drainage. Tympanic membrane is not bulging. A middle ear effusion is present.   Left Ear: Hearing, external ear and ear canal normal. " No drainage. Tympanic membrane is erythematous. Tympanic membrane is not bulging. A middle ear effusion is present.   Nose: Rhinorrhea and congestion present. No mucosal edema or nasal deformity. No epistaxis. Right sinus exhibits no maxillary sinus tenderness and no frontal sinus tenderness. Left sinus exhibits no maxillary sinus tenderness and no frontal sinus tenderness.   Mouth/Throat: Uvula is midline, oropharynx is clear and moist and mucous membranes are normal. Mucous membranes are moist. No trismus in the jaw. Normal dentition. No uvula swelling. No oropharyngeal exudate, posterior oropharyngeal edema or posterior oropharyngeal erythema. Oropharynx is clear.      Comments: PND  Eyes: Conjunctivae and lids are normal. No scleral icterus.   Neck: Trachea normal and phonation normal. Neck supple. No edema present. No erythema present. No neck rigidity present.   Cardiovascular: Normal rate, regular rhythm, normal heart sounds and normal pulses.   Pulmonary/Chest: Effort normal and breath sounds normal. No respiratory distress. She has no decreased breath sounds. She has no wheezes. She has no rhonchi. She has no rales.   Abdominal: Normal appearance.   Musculoskeletal: Normal range of motion.         General: No deformity. Normal range of motion.   Neurological: She is alert and oriented to person, place, and time. She exhibits normal muscle tone. Coordination normal.   Skin: Skin is warm, dry, intact, not diaphoretic and not pale.   Psychiatric: Her speech is normal and behavior is normal. Judgment and thought content normal.   Nursing note and vitals reviewed.    Results for orders placed or performed in visit on 06/09/22   POCT COVID-19 Rapid Screening   Result Value Ref Range    POC Rapid COVID Negative Negative     Acceptable Yes            Assessment:       1. Non-recurrent acute suppurative otitis media of left ear without spontaneous rupture of tympanic membrane    2. Bronchitis    3.  Rhinosinusitis          Plan:         Non-recurrent acute suppurative otitis media of left ear without spontaneous rupture of tympanic membrane  -     amoxicillin-clavulanate 875-125mg (AUGMENTIN) 875-125 mg per tablet; Take 1 tablet by mouth every 12 (twelve) hours. for 7 days  Dispense: 14 tablet; Refill: 0    Bronchitis  -     POCT COVID-19 Rapid Screening    Rhinosinusitis                   Patient Instructions     - Rest.    - Drink plenty of fluids.    - Take full course of antibiotics as prescribed.    - You can take over-the-counter claritin, zyrtec, allegra, or xyzal as directed. These are antihistamines that can help with runny nose, nasal congestion, sneezing, and helps to dry up post-nasal drip, which usually causes sore throat and cough.              - If you do NOT have high blood pressure, you may use a decongestant form (D)  of this medication (ie. Claritin- D, zyrtec-D, allegra-D) or if you do not take the D form, you can take sudafed (pseudoephedrine) over the counter, which is a decongestant. Do NOT take two decongestant (D) medications at the same time (such as mucinex-D and claritin-D or plain sudafed and claritin D)    - If you DO have Hypertension, anxiety, or palpitations, it is safe to take Coricidin HBP for relief of sinus symptoms.     - You can use Flonase (fluticasone) nasal spray as directed for sinus congestion and postnasal drip. This is a steroid nasal spray that works locally over time to decrease the inflammation in your nose/sinuses and help with allergic symptoms. This is not an quick- relief spray like afrin, but it works well if used daily.  Discontinue if you develop nose bleed  - use nasal saline prior to Flonase.  - Use Ocean Spray Nasal Saline 1-3 puffs each nostril every 2-3 hours then blow out onto tissue. This is to irrigate the nasal passage way to clear the sinus openings. Use until sinus problem resolved.     - you can take plain Mucinex (guaifenesin) 1200 mg twice a  day to help loosen mucous.      -warm salt water gargles can help with sore throat     - warm tea with honey can help with cough. Honey is a natural cough suppressant.     - Dextromethorphan (DM) is a cough suppressant over the counter (ie. mucinex DM, robitussin, delsym; dayquil/nyquil has DM as well.)        - Follow up with your PCP or specialty clinic as directed in the next 1-2 weeks if not improved or as needed.  You can call [phone] to schedule an appointment with the appropriate provider.       - Go to the ER if you develop new or worsening symptoms.      - You must understand that you have received an Urgent Care treatment only and that you may be released before all of your medical problems are known or treated.   - You, the patient, will arrange for follow up care as instructed.   - If your condition worsens or fails to improve we recommend that you receive another evaluation at the ER immediately or contact your PCP to discuss your concerns or return here.

## 2022-06-09 NOTE — PATIENT INSTRUCTIONS
- Rest.    - Drink plenty of fluids.    - Take full course of antibiotics as prescribed.    - You can take over-the-counter claritin, zyrtec, allegra, or xyzal as directed. These are antihistamines that can help with runny nose, nasal congestion, sneezing, and helps to dry up post-nasal drip, which usually causes sore throat and cough.              - If you do NOT have high blood pressure, you may use a decongestant form (D)  of this medication (ie. Claritin- D, zyrtec-D, allegra-D) or if you do not take the D form, you can take sudafed (pseudoephedrine) over the counter, which is a decongestant. Do NOT take two decongestant (D) medications at the same time (such as mucinex-D and claritin-D or plain sudafed and claritin D)    - If you DO have Hypertension, anxiety, or palpitations, it is safe to take Coricidin HBP for relief of sinus symptoms.     - You can use Flonase (fluticasone) nasal spray as directed for sinus congestion and postnasal drip. This is a steroid nasal spray that works locally over time to decrease the inflammation in your nose/sinuses and help with allergic symptoms. This is not an quick- relief spray like afrin, but it works well if used daily.  Discontinue if you develop nose bleed  - use nasal saline prior to Flonase.  - Use Ocean Spray Nasal Saline 1-3 puffs each nostril every 2-3 hours then blow out onto tissue. This is to irrigate the nasal passage way to clear the sinus openings. Use until sinus problem resolved.     - you can take plain Mucinex (guaifenesin) 1200 mg twice a day to help loosen mucous.      -warm salt water gargles can help with sore throat     - warm tea with honey can help with cough. Honey is a natural cough suppressant.     - Dextromethorphan (DM) is a cough suppressant over the counter (ie. mucinex DM, robitussin, delsym; dayquil/nyquil has DM as well.)        - Follow up with your PCP or specialty clinic as directed in the next 1-2 weeks if not improved or as needed.   You can call [phone] to schedule an appointment with the appropriate provider.       - Go to the ER if you develop new or worsening symptoms.      - You must understand that you have received an Urgent Care treatment only and that you may be released before all of your medical problems are known or treated.   - You, the patient, will arrange for follow up care as instructed.   - If your condition worsens or fails to improve we recommend that you receive another evaluation at the ER immediately or contact your PCP to discuss your concerns or return here.

## 2022-11-10 NOTE — LACTATION NOTE
Pt to call for latch assistance / assessment.   Tranexamic Acid Counseling:  Patient advised of the small risk of bleeding problems with tranexamic acid. They were also instructed to call if they developed any nausea, vomiting or diarrhea. All of the patient's questions and concerns were addressed.

## 2023-05-29 ENCOUNTER — TELEPHONE (OUTPATIENT)
Dept: OBSTETRICS AND GYNECOLOGY | Facility: CLINIC | Age: 38
End: 2023-05-29
Payer: COMMERCIAL

## 2023-05-29 NOTE — TELEPHONE ENCOUNTER
----- Message from Sabine Smith sent at 5/29/2023  7:16 AM CDT -----  Type:  Needs Medical Advice    Who Called: pt  Symptoms (please be specific): pt is requesting to be seen by this provider for breast pain      Would the patient rather a call back or a response via [facility]? call  Best Call Back Number: [phone]  Additional Information:

## 2023-07-05 ENCOUNTER — TELEPHONE (OUTPATIENT)
Dept: OBSTETRICS AND GYNECOLOGY | Facility: CLINIC | Age: 38
End: 2023-07-05
Payer: COMMERCIAL

## 2023-07-05 NOTE — TELEPHONE ENCOUNTER
----- Message from Sabine Smith sent at 7/5/2023  8:18 AM CDT -----  Type:  Mammogram    Caller is requesting to schedule their annual mammogram appointment.  Order is not listed in EPIC.  Please enter order and contact patient to schedule.  Name of Caller:pt  Where would they like the mammogram performed?[facility]  Would the patient rather a call back or a response via [facility]? call  Best Call Back Number:[phone]  Additional Information: pt stated that she has been having some breast pain

## 2023-07-05 NOTE — TELEPHONE ENCOUNTER
----- Message from Sabine Smith sent at 7/5/2023  8:18 AM CDT -----  Type:  Mammogram    Caller is requesting to schedule their annual mammogram appointment.  Order is not listed in EPIC.  Please enter order and contact patient to schedule.  Name of Caller:pt  Where would they like the mammogram performed?[facility]  Would the patient rather a call back or a response via [facility]? call  Best Call Back Number:[phone]  Additional Information: pt stated that she has been having some breast pain

## 2023-07-11 ENCOUNTER — OFFICE VISIT (OUTPATIENT)
Dept: OBSTETRICS AND GYNECOLOGY | Facility: CLINIC | Age: 38
End: 2023-07-11
Attending: OBSTETRICS & GYNECOLOGY
Payer: COMMERCIAL

## 2023-07-11 ENCOUNTER — LAB VISIT (OUTPATIENT)
Dept: LAB | Facility: OTHER | Age: 38
End: 2023-07-11
Attending: OBSTETRICS & GYNECOLOGY
Payer: COMMERCIAL

## 2023-07-11 VITALS
HEIGHT: 65 IN | DIASTOLIC BLOOD PRESSURE: 82 MMHG | BODY MASS INDEX: 38.02 KG/M2 | SYSTOLIC BLOOD PRESSURE: 116 MMHG | WEIGHT: 228.19 LBS

## 2023-07-11 DIAGNOSIS — N92.0 MENORRHAGIA WITH REGULAR CYCLE: ICD-10-CM

## 2023-07-11 DIAGNOSIS — N63.0 MASS OF BREAST, UNSPECIFIED LATERALITY: ICD-10-CM

## 2023-07-11 DIAGNOSIS — N64.4 BREAST PAIN: ICD-10-CM

## 2023-07-11 DIAGNOSIS — Z01.419 ENCOUNTER FOR GYNECOLOGICAL EXAMINATION WITHOUT ABNORMAL FINDING: Primary | ICD-10-CM

## 2023-07-11 DIAGNOSIS — Z12.4 PAP SMEAR FOR CERVICAL CANCER SCREENING: ICD-10-CM

## 2023-07-11 DIAGNOSIS — Z12.31 SCREENING MAMMOGRAM, ENCOUNTER FOR: ICD-10-CM

## 2023-07-11 LAB
BASOPHILS # BLD AUTO: 0.11 K/UL (ref 0–0.2)
BASOPHILS NFR BLD: 1 % (ref 0–1.9)
DIFFERENTIAL METHOD: ABNORMAL
EOSINOPHIL # BLD AUTO: 0.5 K/UL (ref 0–0.5)
EOSINOPHIL NFR BLD: 4.5 % (ref 0–8)
ERYTHROCYTE [DISTWIDTH] IN BLOOD BY AUTOMATED COUNT: 16.7 % (ref 11.5–14.5)
HCT VFR BLD AUTO: 39.4 % (ref 37–48.5)
HGB BLD-MCNC: 12.4 G/DL (ref 12–16)
IMM GRANULOCYTES # BLD AUTO: 0.08 K/UL (ref 0–0.04)
IMM GRANULOCYTES NFR BLD AUTO: 0.8 % (ref 0–0.5)
LYMPHOCYTES # BLD AUTO: 2.8 K/UL (ref 1–4.8)
LYMPHOCYTES NFR BLD: 25.9 % (ref 18–48)
MCH RBC QN AUTO: 25.4 PG (ref 27–31)
MCHC RBC AUTO-ENTMCNC: 31.5 G/DL (ref 32–36)
MCV RBC AUTO: 81 FL (ref 82–98)
MONOCYTES # BLD AUTO: 0.9 K/UL (ref 0.3–1)
MONOCYTES NFR BLD: 8.4 % (ref 4–15)
NEUTROPHILS # BLD AUTO: 6.3 K/UL (ref 1.8–7.7)
NEUTROPHILS NFR BLD: 59.4 % (ref 38–73)
NRBC BLD-RTO: 0 /100 WBC
PLATELET # BLD AUTO: 420 K/UL (ref 150–450)
PMV BLD AUTO: 10.5 FL (ref 9.2–12.9)
RBC # BLD AUTO: 4.88 M/UL (ref 4–5.4)
WBC # BLD AUTO: 10.63 K/UL (ref 3.9–12.7)

## 2023-07-11 PROCEDURE — 3074F PR MOST RECENT SYSTOLIC BLOOD PRESSURE < 130 MM HG: ICD-10-PCS | Mod: CPTII,S$GLB,, | Performed by: OBSTETRICS & GYNECOLOGY

## 2023-07-11 PROCEDURE — 3079F PR MOST RECENT DIASTOLIC BLOOD PRESSURE 80-89 MM HG: ICD-10-PCS | Mod: CPTII,S$GLB,, | Performed by: OBSTETRICS & GYNECOLOGY

## 2023-07-11 PROCEDURE — 36415 COLL VENOUS BLD VENIPUNCTURE: CPT | Performed by: OBSTETRICS & GYNECOLOGY

## 2023-07-11 PROCEDURE — 3074F SYST BP LT 130 MM HG: CPT | Mod: CPTII,S$GLB,, | Performed by: OBSTETRICS & GYNECOLOGY

## 2023-07-11 PROCEDURE — 99999 PR PBB SHADOW E&M-EST. PATIENT-LVL III: CPT | Mod: PBBFAC,,, | Performed by: OBSTETRICS & GYNECOLOGY

## 2023-07-11 PROCEDURE — 1159F MED LIST DOCD IN RCRD: CPT | Mod: CPTII,S$GLB,, | Performed by: OBSTETRICS & GYNECOLOGY

## 2023-07-11 PROCEDURE — 4010F ACE/ARB THERAPY RXD/TAKEN: CPT | Mod: CPTII,S$GLB,, | Performed by: OBSTETRICS & GYNECOLOGY

## 2023-07-11 PROCEDURE — 88175 CYTOPATH C/V AUTO FLUID REDO: CPT | Performed by: OBSTETRICS & GYNECOLOGY

## 2023-07-11 PROCEDURE — 3008F BODY MASS INDEX DOCD: CPT | Mod: CPTII,S$GLB,, | Performed by: OBSTETRICS & GYNECOLOGY

## 2023-07-11 PROCEDURE — 99385 PREV VISIT NEW AGE 18-39: CPT | Mod: S$GLB,,, | Performed by: OBSTETRICS & GYNECOLOGY

## 2023-07-11 PROCEDURE — 4010F PR ACE/ARB THEARPY RXD/TAKEN: ICD-10-PCS | Mod: CPTII,S$GLB,, | Performed by: OBSTETRICS & GYNECOLOGY

## 2023-07-11 PROCEDURE — 99385 PR PREVENTIVE VISIT,NEW,18-39: ICD-10-PCS | Mod: S$GLB,,, | Performed by: OBSTETRICS & GYNECOLOGY

## 2023-07-11 PROCEDURE — 1159F PR MEDICATION LIST DOCUMENTED IN MEDICAL RECORD: ICD-10-PCS | Mod: CPTII,S$GLB,, | Performed by: OBSTETRICS & GYNECOLOGY

## 2023-07-11 PROCEDURE — 3008F PR BODY MASS INDEX (BMI) DOCUMENTED: ICD-10-PCS | Mod: CPTII,S$GLB,, | Performed by: OBSTETRICS & GYNECOLOGY

## 2023-07-11 PROCEDURE — 3079F DIAST BP 80-89 MM HG: CPT | Mod: CPTII,S$GLB,, | Performed by: OBSTETRICS & GYNECOLOGY

## 2023-07-11 PROCEDURE — 99999 PR PBB SHADOW E&M-EST. PATIENT-LVL III: ICD-10-PCS | Mod: PBBFAC,,, | Performed by: OBSTETRICS & GYNECOLOGY

## 2023-07-11 PROCEDURE — 85025 COMPLETE CBC W/AUTO DIFF WBC: CPT | Performed by: OBSTETRICS & GYNECOLOGY

## 2023-07-11 PROCEDURE — 87624 HPV HI-RISK TYP POOLED RSLT: CPT | Performed by: OBSTETRICS & GYNECOLOGY

## 2023-07-11 RX ORDER — OMEPRAZOLE 40 MG/1
40 CAPSULE, DELAYED RELEASE ORAL
COMMUNITY
Start: 2023-03-15

## 2023-07-11 RX ORDER — SEMAGLUTIDE 0.68 MG/ML
INJECTION, SOLUTION SUBCUTANEOUS
COMMUNITY
Start: 2023-06-27

## 2023-07-11 NOTE — PROGRESS NOTES
Subjective:       Patient ID: Zhanna Carlton is a 37 y.o. female.    Chief Complaint:  Annual Exam, Gynecologic Exam, and Breast Pain      History of Present Illness  HPI    Zhanna Carlton is a 37 y.o. female  here for her annual GYN exam.  She reports left sided breast pain intermittently for the past month. She is very concerned about this, as her mother has had breast cancer since age 60 , her half sister has had breast cancer at age 45 and her Paternal GM had breast cancer and later ovarian cancer.   She describes her periods as regular, heavy flow, lasting 5-7 days periods are also very painful.   denies break through bleeding.   denies vaginal itching or irritation.  Denies vaginal discharge.  She is sexually active. She has had1 partner for the past 8 years .  She uses bilateral tubal ligation for contraception.   History of abnormal pap: No  Last Pap: approximate date 2019 and was normal  Last MMG: normal-2019: BI-RADS Category 1: Negative-routine follow-up in 12 months  Last Colonoscopy:  None  denies domestic violence. She does feel safe at home.     Past Medical History:   Diagnosis Date    G6PD deficiency     Hypertension      Past Surgical History:   Procedure Laterality Date     SECTION      x 3    TUBAL LIGATION       Social History     Socioeconomic History    Marital status: Single   Occupational History     Employer: [employer]   Tobacco Use    Smoking status: Every Day     Packs/day: 0.50     Years: 20.00     Pack years: 10.00     Types: Cigarettes    Smokeless tobacco: Former   Substance and Sexual Activity    Alcohol use: No    Drug use: No    Sexual activity: Yes     Partners: Male     Birth control/protection: None     Comment: Current partner since :Chong     Family History   Problem Relation Age of Onset    Breast cancer Paternal Grandmother         unk age of onset    Ovarian cancer Paternal Grandmother     Cirrhosis Father     Breast cancer Mother 60         "unilateral, no genetic testing    Breast cancer Sister 45        maternal half-sister, unilateral, BRCA-negative    Breast cancer Other         maternal GGM    Cancer Other         maternal GGF with lung cancer    Colon cancer Neg Hx     Stroke Neg Hx     Hypertension Neg Hx     Diabetes Neg Hx      OB History          3    Para   3    Term   2       1    AB        Living   3         SAB        IAB        Ectopic        Multiple   0    Live Births   3                 /82   Ht 5' 5" (1.651 m)   Wt 103.5 kg (228 lb 3.2 oz)   LMP 2023 (Approximate)   BMI 37.97 kg/m²         GYN & OB History  Patient's last menstrual period was 2023 (approximate).   Date of Last Pap: No result found    OB History    Para Term  AB Living   3 3 2 1   3   SAB IAB Ectopic Multiple Live Births         0 3      # Outcome Date GA Lbr Michael/2nd Weight Sex Delivery Anes PTL Lv   3 Term 17 39w2d  3.74 kg (8 lb 3.9 oz) M CS-LTranv EPI N KARL   2  08 36w5d  3.358 kg (7 lb 6.5 oz) M CS-LTranv Spinal  KARL      Birth Comments: Decreased FM, Nonreassuring FHR testing.   1 Term 05 39w5d  3.941 kg (8 lb 11 oz)  CS-LTranv   KARL      Complications: Failed induction, Pre-eclampsia       Review of Systems  Review of Systems   Constitutional:  Negative for activity change, appetite change, fatigue and unexpected weight change.   HENT: Negative.     Eyes:  Negative for visual disturbance.   Respiratory:  Negative for shortness of breath and wheezing.    Cardiovascular:  Negative for chest pain, palpitations and leg swelling.   Gastrointestinal:  Negative for abdominal pain, bloating and blood in stool.   Endocrine: Negative for diabetes and hair loss.   Genitourinary:  Positive for dysmenorrhea, menorrhagia and menstrual problem. Negative for decreased libido and dyspareunia.   Musculoskeletal:  Negative for back pain and joint swelling.   Integumentary:  Negative for acne, hair changes " and nipple discharge.   Neurological:  Negative for headaches.   Hematological:  Does not bruise/bleed easily.   Psychiatric/Behavioral:  Negative for depression and sleep disturbance. The patient is not nervous/anxious.    Breast: Negative for mastodynia and nipple discharge        Objective:      Physical Exam:   Constitutional: She is oriented to person, place, and time. She appears well-developed and well-nourished.    HENT:   Head: Normocephalic and atraumatic.    Eyes: Pupils are equal, round, and reactive to light. EOM are normal.     Cardiovascular:  Normal rate and regular rhythm.             Pulmonary/Chest: Effort normal and breath sounds normal.   BREASTS:  Right breast exhibits no inverted nipple, no mass, no nipple discharge, no skin change, no tenderness, no bleeding and no swelling. Left breast exhibits no inverted nipple, irregular shaped indeterminate mass beneath nipple approx 2x 4cm  no nipple discharge, no skin change, mild tenderness, no bleeding and no swelling.               Abdominal: Soft. Bowel sounds are normal.     Genitourinary:    Pelvic exam was performed with patient supine.      Genitourinary Comments: PELVIC: Normal external genitalia without lesions.  Normal hair distribution.  Adequate perineal body, normal urethral meatus.  Vagina moist and well rugated without lesions or discharge.  Cervix pink, without lesions, discharge or tenderness.  No significant cystocele or rectocele.  Bimanual exam shows uterus to be normal size, regular, mobile and nontender.  Adnexa without masses or tenderness.                   Musculoskeletal: Normal range of motion and moves all extremeties.       Neurological: She is alert and oriented to person, place, and time.    Skin: Skin is warm and dry.    Psychiatric: She has a normal mood and affect.            Assessment:        1. Encounter for gynecological examination without abnormal finding    2. Pap smear for cervical cancer screening    3.  Screening mammogram, encounter for    4. Menorrhagia with regular cycle    5. Breast pain    6. Mass of breast, unspecified laterality                Plan:        Problem List Items Addressed This Visit    None  Visit Diagnoses       Encounter for gynecological examination without abnormal finding    -  Primary    Pap smear for cervical cancer screening        Relevant Orders    HPV High Risk Genotypes, PCR    Liquid-Based Pap Smear, Screening    Screening mammogram, encounter for        Menorrhagia with regular cycle        Relevant Orders    CBC Auto Differential    US Pelvis Comp with Transvag NON-OB (xpd    Breast pain        Relevant Orders    Mammo Digital Diagnostic Bilat with Jamshid    Mass of breast, unspecified laterality        Relevant Orders    Mammo Digital Diagnostic Bilat with Jamshid    US Breast Left Limited             Follow up in about 1 year (around 7/11/2024), or if symptoms worsen or fail to improve.

## 2023-07-12 ENCOUNTER — HOSPITAL ENCOUNTER (OUTPATIENT)
Dept: RADIOLOGY | Facility: HOSPITAL | Age: 38
Discharge: HOME | End: 2023-07-12
Attending: OBSTETRICS & GYNECOLOGY
Payer: COMMERCIAL

## 2023-07-12 ENCOUNTER — TELEPHONE (OUTPATIENT)
Dept: GYNECOLOGIC ONCOLOGY | Facility: CLINIC | Age: 38
End: 2023-07-12
Payer: COMMERCIAL

## 2023-07-12 DIAGNOSIS — N63.0 MASS OF BREAST, UNSPECIFIED LATERALITY: ICD-10-CM

## 2023-07-12 DIAGNOSIS — N64.4 BREAST PAIN: ICD-10-CM

## 2023-07-12 DIAGNOSIS — N92.0 MENORRHAGIA WITH REGULAR CYCLE: ICD-10-CM

## 2023-07-12 PROCEDURE — 77066 MAMMO DIGITAL DIAGNOSTIC BILAT WITH TOMO: ICD-10-PCS | Mod: 26,,, | Performed by: RADIOLOGY

## 2023-07-12 PROCEDURE — 77062 MAMMO DIGITAL DIAGNOSTIC BILAT WITH TOMO: ICD-10-PCS | Mod: 26,,, | Performed by: RADIOLOGY

## 2023-07-12 PROCEDURE — 76830 TRANSVAGINAL US NON-OB: CPT | Mod: 26,,, | Performed by: RADIOLOGY

## 2023-07-12 PROCEDURE — 76642 ULTRASOUND BREAST LIMITED: CPT | Mod: 26,LT,, | Performed by: RADIOLOGY

## 2023-07-12 PROCEDURE — 77062 BREAST TOMOSYNTHESIS BI: CPT | Mod: TC

## 2023-07-12 PROCEDURE — 76856 US EXAM PELVIC COMPLETE: CPT | Mod: 26,,, | Performed by: RADIOLOGY

## 2023-07-12 PROCEDURE — 76642 ULTRASOUND BREAST LIMITED: CPT | Mod: TC,LT

## 2023-07-12 PROCEDURE — 76856 US EXAM PELVIC COMPLETE: CPT | Mod: TC

## 2023-07-12 PROCEDURE — 76830 US PELVIS COMP WITH TRANSVAG NON-OB (XPD): ICD-10-PCS | Mod: 26,,, | Performed by: RADIOLOGY

## 2023-07-12 PROCEDURE — 77062 BREAST TOMOSYNTHESIS BI: CPT | Mod: 26,,, | Performed by: RADIOLOGY

## 2023-07-12 PROCEDURE — 76642 US BREAST LEFT LIMITED: ICD-10-PCS | Mod: 26,LT,, | Performed by: RADIOLOGY

## 2023-07-12 PROCEDURE — 77066 DX MAMMO INCL CAD BI: CPT | Mod: 26,,, | Performed by: RADIOLOGY

## 2023-07-12 PROCEDURE — 76856 US PELVIS COMP WITH TRANSVAG NON-OB (XPD): ICD-10-PCS | Mod: 26,,, | Performed by: RADIOLOGY

## 2023-07-12 NOTE — TELEPHONE ENCOUNTER
----- Message from Rocky Bond RN sent at 7/11/2023 11:51 AM CDT -----    ----- Message -----  From: Merari Mosquera MD  Sent: 7/11/2023  11:46 AM CDT  To: [facility] Staff    Please schedule for Genetic screening, strong family hx breast cancer.

## 2023-07-12 NOTE — TELEPHONE ENCOUNTER
Left voicemail to schedule with tele genetics.  Provider Scheduling Coord.  Gynecologic Oncology MA/PAR /Preceptor Ubaldo Persaud

## 2023-07-13 DIAGNOSIS — N92.0 MENORRHAGIA WITH REGULAR CYCLE: Primary | ICD-10-CM

## 2023-07-13 RX ORDER — TRANEXAMIC ACID 650 MG/1
1300 TABLET ORAL 3 TIMES DAILY
Qty: 30 TABLET | Refills: 6 | Status: SHIPPED | OUTPATIENT
Start: 2023-07-13

## 2023-07-14 LAB
HPV HR 12 DNA SPEC QL NAA+PROBE: NEGATIVE
HPV16 AG SPEC QL: NEGATIVE
HPV18 DNA SPEC QL NAA+PROBE: NEGATIVE

## 2023-07-17 LAB
FINAL PATHOLOGIC DIAGNOSIS: NORMAL
Lab: NORMAL

## 2023-07-18 ENCOUNTER — TELEPHONE (OUTPATIENT)
Dept: GYNECOLOGIC ONCOLOGY | Facility: CLINIC | Age: 38
End: 2023-07-18
Payer: COMMERCIAL

## 2023-07-18 NOTE — TELEPHONE ENCOUNTER
----- Message from Ubaldo Persaud MA sent at 7/12/2023 12:42 PM CDT -----  Left voicemail to schedule with tele genetics.  Provider Scheduling Coord.  Gynecologic Oncology MA/PAR /Preceptor Ubaldo Persaud

## 2023-07-18 NOTE — TELEPHONE ENCOUNTER
Spoke with our patient about her  schedule appointment she voiced understanding of the date, time and location. All questions answered appointment mail. Provider Scheduling Coord.  Gynecologic Oncology MA/PAR /Preceptor Ubaldo Persaud

## 2023-07-20 ENCOUNTER — TELEPHONE (OUTPATIENT)
Dept: GYNECOLOGIC ONCOLOGY | Facility: CLINIC | Age: 38
End: 2023-07-20
Payer: COMMERCIAL

## 2023-07-20 NOTE — TELEPHONE ENCOUNTER
----- Message from Chely Farrell sent at 7/20/2023 10:27 AM CDT -----  Regarding: Patient call back  Who called:PAOLO THOMAS [[id number]]    What is the request in detail: Patient is requesting a call back. Patient would like to r/s her genetics appt. She would like to further discuss.   Please advise.    Can the clinic reply by [facility]? No    Best call back number: [phone]    Additional Information: N/A

## 2023-07-20 NOTE — TELEPHONE ENCOUNTER
Spoke with our patient about her  reschedule appointment she voiced understanding of the date, time and location. All questions answered appointment mail. Provider Scheduling Coord.  Gynecologic Oncology MA/PAR /Preceptor Ubaldo Persaud

## 2023-10-25 ENCOUNTER — PATIENT MESSAGE (OUTPATIENT)
Dept: OBSTETRICS AND GYNECOLOGY | Facility: CLINIC | Age: 38
End: 2023-10-25
Payer: COMMERCIAL

## 2023-10-26 DIAGNOSIS — Z23 NEED FOR HPV VACCINATION: Primary | ICD-10-CM

## 2024-04-11 NOTE — PROGRESS NOTES
"CC: Post-partum follow-up    Zhanna Carlton is a 31 y.o. female  who presents for post-partum visit.  She is S/P a , due to Prior  x 2.  She and the baby are doing well.  No pain.  No fever.   No bowel / bladder complaints.    Delivery Date: 2017  Delivery MD: Merari Mosquera    Gender: male  Name: John Ortiz  Birth Weight: 8 pounds 4 ounces  Breast Feeding: Previously, not for 2 weeks.  Depression: NO  Contraception: bilateral tubal ligation    Pregnancy was complicated by:  Gestational HTN    /86  Ht 5' 5" (1.651 m)  Wt 102.6 kg (226 lb 3.1 oz)  LMP 2016 (Exact Date)  Breastfeeding? No  BMI 37.64 kg/m2    ROS:  GENERAL: No fever, chills, fatigability.  VULVAR: No pain, no lesions and no itching.  VAGINAL: No relaxation, no itching, no discharge, no abnormal bleeding and no lesions.  ABDOMEN: No abdominal pain. Denies nausea. Denies vomiting. No diarrhea. No constipation  BREAST: Denies pain. No lumps. No discharge.  URINARY: No incontinence, no nocturia, no frequency and no dysuria.  CARDIOVASCULAR: No chest pain. No shortness of breath. No leg cramps.  NEUROLOGICAL: No headaches. No vision changes.    PHYSICAL EXAM:  ABDOMEN:  Soft, non-tender, non-distended  VULVA:  Normal, no lesions  CERVIX:  Without lesions, polyps or tenderness.  UTERUS:  Normal size, shape, consistency, no mass or tenderness.  ADNEXA:  Normal in size without mass or tenderness    IMP:  Doing well S/P , due to Prior  x 2  Instructions / precautions reviewed  Contraceptive counseling    Rx PNV    PLAN:  Advised to follow up with PCP for B/P control/management  May resume normal activities  Return in about 3 months (around 2017).      " -given positive Thessaly maneuver on exam today, will order MRI to rule out meniscal tear.

## 2024-06-28 ENCOUNTER — PATIENT MESSAGE (OUTPATIENT)
Dept: OBSTETRICS AND GYNECOLOGY | Facility: CLINIC | Age: 39
End: 2024-06-28
Payer: COMMERCIAL

## 2024-07-10 ENCOUNTER — OFFICE VISIT (OUTPATIENT)
Dept: OBSTETRICS AND GYNECOLOGY | Facility: CLINIC | Age: 39
End: 2024-07-10

## 2024-07-10 ENCOUNTER — LAB VISIT (OUTPATIENT)
Dept: LAB | Facility: OTHER | Age: 39
End: 2024-07-10

## 2024-07-10 VITALS — HEIGHT: 65 IN | WEIGHT: 214.75 LBS | BODY MASS INDEX: 35.78 KG/M2

## 2024-07-10 DIAGNOSIS — Z72.89 OTHER PROBLEMS RELATED TO LIFESTYLE: ICD-10-CM

## 2024-07-10 DIAGNOSIS — Z11.3 SCREEN FOR STD (SEXUALLY TRANSMITTED DISEASE): Primary | ICD-10-CM

## 2024-07-10 DIAGNOSIS — Z11.3 SCREEN FOR STD (SEXUALLY TRANSMITTED DISEASE): ICD-10-CM

## 2024-07-10 LAB
HBV SURFACE AG SERPL QL IA: NORMAL
HCV AB SERPL QL IA: NEGATIVE
HIV 1+2 AB+HIV1 P24 AG SERPL QL IA: NEGATIVE
TREPONEMA PALLIDUM IGG+IGM AB [PRESENCE] IN SERUM OR PLASMA BY IMMUNOASSAY: NONREACTIVE

## 2024-07-10 PROCEDURE — 87389 HIV-1 AG W/HIV-1&-2 AB AG IA: CPT

## 2024-07-10 PROCEDURE — 99213 OFFICE O/P EST LOW 20 MIN: CPT | Mod: PBBFAC

## 2024-07-10 PROCEDURE — 87340 HEPATITIS B SURFACE AG IA: CPT

## 2024-07-10 PROCEDURE — 87491 CHLMYD TRACH DNA AMP PROBE: CPT

## 2024-07-10 PROCEDURE — 86803 HEPATITIS C AB TEST: CPT

## 2024-07-10 PROCEDURE — 86593 SYPHILIS TEST NON-TREP QUANT: CPT

## 2024-07-10 PROCEDURE — 99213 OFFICE O/P EST LOW 20 MIN: CPT | Mod: S$PBB,,,

## 2024-07-10 PROCEDURE — 87591 N.GONORRHOEAE DNA AMP PROB: CPT

## 2024-07-10 PROCEDURE — 36415 COLL VENOUS BLD VENIPUNCTURE: CPT

## 2024-07-10 PROCEDURE — 99999 PR PBB SHADOW E&M-EST. PATIENT-LVL III: CPT | Mod: PBBFAC,,,

## 2024-07-10 NOTE — PROGRESS NOTES
History & Physical  Gynecology      SUBJECTIVE:     Chief Complaint:   STD CHECK (Blood work)     History of Present Illness  Zhanna Carlton is a 38 y.o. female who presents to the walk-in clinic requesting STD testing. Patient states she found out in 2023 that her ex- was engaging in sexual intercourse with another female. Patient is currently sexually active with 1 new male partner and does report using condoms during sexual intercourse. She notes her current partner did inform her of a lesion on his penis. Denies fever, chills, dysuria, hematuria, or abnormal vaginal discharge, odor, pain, or bleeding. LMP on 24.    BP Readings from Last 2 Encounters:   23 116/82   22 130/89        Review of patient's allergies indicates:   Allergen Reactions    Iodinated contrast media Rash       Past Medical History:   Diagnosis Date    G6PD deficiency     Hypertension      Past Surgical History:   Procedure Laterality Date     SECTION      x 3    TUBAL LIGATION       OB History          4    Para   4    Term   3       1    AB        Living   3         SAB        IAB        Ectopic        Multiple   0    Live Births   3               Family History   Problem Relation Name Age of Onset    Breast cancer Paternal Grandmother          unk age of onset    Ovarian cancer Paternal Grandmother      Cirrhosis Father      Breast cancer Mother  60        unilateral, no genetic testing    Breast cancer Sister  45        maternal half-sister, unilateral, BRCA-negative    Breast cancer Other          maternal GGM    Cancer Other          maternal GGF with lung cancer    Colon cancer Neg Hx      Stroke Neg Hx      Hypertension Neg Hx      Diabetes Neg Hx       Social History     Tobacco Use    Smoking status: Every Day     Current packs/day: 0.50     Average packs/day: 0.5 packs/day for 20.0 years (10.0 ttl pk-yrs)     Types: Cigarettes    Smokeless tobacco: Former   Substance Use  Topics    Alcohol use: No    Drug use: No       Current Outpatient Medications   Medication Sig    tranexamic acid (LYSTEDA) 650 mg tablet Take 2 tablets (1,300 mg total) by mouth 3 (three) times daily.    lisinopriL-hydrochlorothiazide (PRINZIDE,ZESTORETIC) 10-12.5 mg per tablet Take 1 tablet by mouth once daily.    omeprazole (PRILOSEC) 40 MG capsule Take 40 mg by mouth. (Patient not taking: Reported on 7/10/2024)    OZEMPIC 0.25 mg or 0.5 mg (2 mg/3 mL) pen injector SMARTSI.25 Milligram(s) SUB-Q Once a Week (Patient not taking: Reported on 7/10/2024)     No current facility-administered medications for this visit.         Review of Systems:  Review of Systems   Constitutional:  Negative for chills and fever.   Genitourinary:  Negative for dysuria, hematuria, vaginal bleeding, vaginal discharge, vaginal pain and vaginal odor.        OBJECTIVE:     Physical Exam:  Physical Exam  Pulmonary:      Effort: Pulmonary effort is normal.   Neurological:      Mental Status: She is alert and oriented to person, place, and time.   Psychiatric:         Mood and Affect: Mood normal.         ASSESSMENT:       ICD-10-CM ICD-9-CM    1. Screen for STD (sexually transmitted disease)  Z11.3 V74.5 C. trachomatis/N. gonorrhoeae by AMP DNA [facility]; Urine      HIV 1/2 Ag/Ab (4th Gen)      Treponema Pallidium Antibodies IgG, IgM      Hepatitis B Surface Antigen      HEPATITIS C ANTIBODY      2. Other problems related to lifestyle  Z72.89 V69.8 C. trachomatis/N. gonorrhoeae by AMP DNA [facility]; Urine      HIV 1/2 Ag/Ab (4th Gen)      Treponema Pallidium Antibodies IgG, IgM      Hepatitis B Surface Antigen      HEPATITIS C ANTIBODY          Plan:      - GC/CT collected  - STD blood work ordered  - Discussed HSV testing- most accurate if outbreak occurs- will contact us for swab if she notices lesions    Cindy Lin   PA Student     I have seen the patient, reviewed the PA students  assessment, plan, and progress note. I have personally  interviewed and examined the patient at bedside and: agree with the findings.        Francoise Ortiz, KAVON STERLING